# Patient Record
Sex: MALE | Race: WHITE | NOT HISPANIC OR LATINO | URBAN - METROPOLITAN AREA
[De-identification: names, ages, dates, MRNs, and addresses within clinical notes are randomized per-mention and may not be internally consistent; named-entity substitution may affect disease eponyms.]

---

## 2023-08-08 RX ORDER — ATORVASTATIN CALCIUM 80 MG/1
80 TABLET, FILM COATED ORAL AT BEDTIME
Refills: 0 | Status: DISCONTINUED | OUTPATIENT
Start: 2023-08-09 | End: 2023-08-10

## 2023-08-08 RX ORDER — PANTOPRAZOLE SODIUM 20 MG/1
40 TABLET, DELAYED RELEASE ORAL
Refills: 0 | Status: DISCONTINUED | OUTPATIENT
Start: 2023-08-09 | End: 2023-08-10

## 2023-08-08 RX ORDER — TAMSULOSIN HYDROCHLORIDE 0.4 MG/1
1 CAPSULE ORAL
Refills: 0 | DISCHARGE

## 2023-08-08 RX ORDER — SODIUM CHLORIDE 9 MG/ML
3 INJECTION INTRAMUSCULAR; INTRAVENOUS; SUBCUTANEOUS EVERY 8 HOURS
Refills: 0 | Status: DISCONTINUED | OUTPATIENT
Start: 2023-08-09 | End: 2023-08-10

## 2023-08-08 NOTE — H&P ADULT - NSICDXPASTMEDICALHX_GEN_ALL_CORE_FT
PAST MEDICAL HISTORY:  Atrial fibrillation     CAD (coronary artery disease)     HTN (hypertension)

## 2023-08-08 NOTE — H&P ADULT - HISTORY OF PRESENT ILLNESS
62yo M with past medical history significant for HTN, HLD, new onset AF (on Eliquis).. Initially send to ED by primary doctor for new 's noted during office visit. In ED rate control achieved w/ cardizem and pt was admitted for ischemic w/up. NSTabnormal and follow up cardiac cath w/ evidence of severe triple vessel CAD, hyper-dominant RCA, occluded LAB and occluded OM1. Given study results patient transferred to our facility under the care of Dr. Quintero for CABG w/up.     8/8 echo with LVEF 45-50%, no significant VHD.  62yo M with past medical history significant for HTN, HLD and new onset AF (on Eliquis). Initially sent to ED by primary provider for new 's noted during office visit. In ED rate control achieved w/ IV cardizem bolus followed by PO regimen. Admitted for ischemic w/up. NST abnormal. 8/8 echo with LVEF 45-50%, no significant VHD. Cath w/ evidence of severe triple vessel CAD (hyper-dominant RCA, occluded LAD and occluded OM1). Given study results decision made to transfer patient to our facility under the care of Dr. Quintero for CABG w/up.       ? of ring worm from tx sign out.  64yo M with past medical history significant for HTN, HLD and new onset AF (on Eliquis). Initially sent to ED by primary provider for new 's noted during office visit. In ED rate control achieved w/ IV cardizem bolus followed by PO regimen. Admitted for ischemic w/up. NST abnormal. 8/8 echo with LVEF 45-50%, no significant VHD. Cath w/ evidence of severe triple vessel CAD (hyper-dominant RCA, occluded LAD and occluded OM1). Given study results decision made to transfer patient to our facility under the care of Dr. Quintero for CABG w/up. Of note, patient questionable for ring worm infection from tx hospital sign out.  62yo M with past medical history significant for HTN, HLD, new onset AF (on Eliquis) and past spinal surgery for L4-L5 herniation, spinal stenosis. Initially sent to ED by primary provider for new 's noted during office visit. In ED rate control achieved w/ IV cardizem bolus followed by PO regimen. Admitted for ischemic w/up. NST abnormal. 8/8 echo with LVEF 45-50%, no significant VHD. Cath by Dr. Quevedo w/ evidence of severe triple vessel CAD (hyper-dominant RCA, occluded LAD and occluded OM1). Given study results decision made to transfer patient to our facility under the care of Dr. Quintero for CABG w/up. Of note, patient with rash extending on left inner thigh to groin and again on upper right extremity. At this time he denies chest pain, sob, nazario, reduced ET, fatigue, dizziness, syncope, fever.

## 2023-08-08 NOTE — H&P ADULT - ASSESSMENT
Assessment:     64yo M with past medical history significant for HTN, HLD and new onset AF (on Eliquis). Initially sent to ED by primary provider for new 's noted during office visit. In ED rate control achieved w/ IV cardizem bolus followed by PO regimen. Admitted for ischemic w/up. NST abnormal. 8/8 echo with LVEF 45-50%, no significant VHD. Cath w/ evidence of severe triple vessel CAD (hyper-dominant RCA, occluded LAD and occluded OM1). Given study results decision made to transfer patient to our facility under the care of Dr. Quintero for CABG w/up.     ? of ring worm from tx sign out.     Plan:    Neurovascular:   -Pain well controlled with current regimen.   - PRN's:    Cardiovascular:   MvCAD   - cath today w/ severe triple vessel CAD, hyper-dominant RCA, occluded LAD and occluded OM1  - tx to our facility for CABG evaluation; preoperative w/up underway (labs, CXR, EKG, PFT's, carotids, UA ordered; had TTE today at [facility])  - continue *****  HTN   - normotensive on ***  HLD   - continue statin therapy     AF, new onset   -Eliquis on hold (last dose 8/7)   -Hemodynamically stable.   -Monitor: BP, HR, tele    Respiratory:   -Oxygenating well on room air  -Encourage continued use of IS 10x/hr and frequent ambulation  -CXR: pending     GI:  -GI PPX:   -PO Diet  -Bowel Regimen:     Renal / :  -Continue to monitor renal function: BUN/Cr: pending   -Monitor I/O's daily     Endocrine:    -No hx of DM or thyroid dx  -A1c: pending   -TSH: pending     Hematologic:  -no overt s/s of active bleeding   -CBC: H/H-pending   -Coagulation Panel.    ID:  -Temperature: afebrile   -CBC: WBC-no leukocytosis   -Continue to observe for SIRS/Sepsis Syndrome.    Prophylaxis:  -DVT prophylaxis with 5000 SubQ Heparin q8h.  -Continue with SCD's b/l while patient is at rest     Disposition:  Tx from [facility] for CABG eval in the setting of MvCAD   Preoperative labs/imaging ordered (had TTE today)      Assessment:   64yo M with past medical history significant for HTN, HLD, new onset AF (on Eliquis) and past spinal surgery for L4-L5 herniation, spinal stenosis (mild deficit on lower left side "ankle weakness"). Initially sent to ED by primary provider for new 's noted during office visit. In ED rate control achieved w/ IV cardizem bolus followed by PO regimen. Admitted for ischemic w/up. NST abnormal. 8/8 echo with LVEF 45-50%, no significant VHD. Cath by Dr. Quevedo w/ evidence of severe triple vessel CAD (hyper-dominant RCA, occluded LAD and occluded OM1). Given study results decision made to transfer patient to our facility under the care of Dr. Quintero for CABG w/up.     Plan:    Neurovascular:   Hx of spinal surgery  - Left ankle weakness at baseline   - Pain well controlled with current regimen.   - PRN's: Tylenol   -Consider gabapentin. patient has taken in the past     Cardiovascular:   MvCAD   - cath today w/ severe triple vessel CAD, hyper-dominant RCA, occluded LAD and occluded OM1  - transfer to [facility] for CABG evaluation; preoperative w/up underway (labs, CXR, EKG, PFT's, carotids, UA ordered; had TTE today at [facility])  HTN   - normotensive on amlodipine 10  HLD   - continue statin therapy   AF, new onset   -Eliquis on hold (last dose 8/7)   - started on heparin gtt   -Hemodynamically stable.   -Monitor: BP, HR, tele    Respiratory:   -Oxygenating well on room air  -Encourage continued use of IS 10x/hr and frequent ambulation  -CXR: pending     GI:  -GI PPX: protonix   -PO Diet    Renal / :  -Continue to monitor renal function: BUN/Cr: pending   -Monitor I/O's daily     Endocrine:    -No hx of DM or thyroid dx  -A1c: pending   -TSH: pending     Hematologic:  -no overt s/s of active bleeding   -CBC: H/H-pending   -Coagulation Panel.    ID:  -Temperature: afebrile   -CBC: WBC-no leukocytosis   -Continue to observe for SIRS/Sepsis Syndrome.    Prophylaxis:  -DVT prophylaxis with 5000 SubQ Heparin q8h.  -Continue with SCD's b/l while patient is at rest     Disposition:  Tx from [facility] for CABG eval in the setting of MvCAD   Preoperative labs/imaging ordered (had TTE today)      Assessment:   64yo M with past medical history significant for HTN, HLD, new onset AF (on Eliquis) and past spinal surgery for L4-L5 herniation, spinal stenosis (mild deficit on lower left side "ankle weakness"). Initially sent to ED by primary provider for new 's noted during office visit. In ED rate control achieved w/ IV cardizem bolus followed by PO regimen. Admitted for ischemic w/up. NST abnormal. 8/8 echo with LVEF 45-50%, no significant VHD. Cath by Dr. Quevedo w/ evidence of severe triple vessel CAD (hyper-dominant RCA, occluded LAD and occluded OM1). Given study results decision made to transfer patient to our facility under the care of Dr. Quintero for CABG w/up.     Plan:    Neurovascular:   Hx of spinal surgery  - Left ankle weakness at baseline   - Pain well controlled with current regimen.   - PRN's: Tylenol   -Consider gabapentin. patient has taken in the past     Cardiovascular:   MvCAD   - cath today w/ severe triple vessel CAD, hyper-dominant RCA, occluded LAD and occluded OM1  - transfer to [facility] for CABG evaluation; preoperative w/up underway (labs, CXR, EKG, PFT's, carotids, UA ordered; had TTE today at [facility])  HTN   - normotensive  - starting lopressor 12.5 BID   HLD   - continue statin therapy   AF, new onset   -Eliquis on hold (last dose 8/7)   - cont heparin gtt; ptt subtherapeutic on arrival   -Hemodynamically stable.   -Monitor: BP, HR, tele    Respiratory:   -Oxygenating well on room air  -Encourage continued use of IS 10x/hr and frequent ambulation  -CXR: pending     GI:  -GI PPX: protonix   -PO Diet    Renal / :  -Continue to monitor renal function: BUN/Cr: pending   -Monitor I/O's daily     Endocrine:    -No hx of DM or thyroid dx  -A1c: pending   -TSH: pending     Hematologic:  -no overt s/s of active bleeding   -CBC: H/H-pending     ID:  -Temperature: afebrile   -CBC: WBC-no leukocytosis   -Continue to observe for SIRS/Sepsis Syndrome.    Prophylaxis:  -DVT prophylaxis with heparin gtt  -Continue with SCD's b/l while patient is at rest     Disposition:  Tx from [facility] for CABG eval in the setting of MvCAD   Preoperative labs/imaging ordered

## 2023-08-08 NOTE — H&P ADULT - NSHPREVIEWOFSYSTEMS_GEN_ALL_CORE
Review of Systems  CONSTITUTIONAL:  Denies Fevers / chills, sweats, fatigue, weight loss, weight gain                                      NEURO:  Denies changes in sensation, seizures, syncope, confusion                                                                            EYES:  Denies Blurry vision, discharge, pain, loss of vision                                                                                    ENT:  Denies Difficulty hearing, vertigo, dysphagia, epistaxis, recent dental work                                       CV:  Denies Chest pain, palpitations, HURTADO, orthopnea                                                                                          RESPIRATORY:  Denies Wheezing, SOB, cough / sputum, hemoptysis                                                                GI:  Denies Nausea, vomiting, diarrhea, constipation, melena, difficulty swallowing                                               : Denies Hematuria, dysuria, urgency, incontinence                                                                                         MUSCULOSKELETAL:  Denies arthritis, joint swelling, muscle weakness                                                             SKIN/BREAST:  Denies rash, itching, hair loss, masses                                                                                            PSYCH:  Denies depression, anxiety, suicidal ideation                                                                                               HEME/LYMPH:  Denies bruises easily, enlarged lymph nodes, tender lymph nodes                                        ENDOCRINE:  Denies cold intolerance, heat intolerance, polydipsia Review of Systems  CONSTITUTIONAL:  Denies Fevers / chills, sweats, fatigue, weight loss, weight gain                                      NEURO:  Denies changes in sensation, seizures, syncope, confusion                                                                            EYES:  Denies Blurry vision, discharge, pain, loss of vision                                                                                    ENT:  Denies Difficulty hearing, vertigo, dysphagia, epistaxis, recent dental work                                       CV:  Denies Chest pain, palpitations, HURTADO, orthopnea                                                                                          RESPIRATORY:  Denies Wheezing, SOB, cough / sputum, hemoptysis                                                                GI:  Denies Nausea, vomiting, diarrhea, constipation, melena, difficulty swallowing                                               : Denies Hematuria, dysuria, urgency, incontinence                                                                                         MUSCULOSKELETAL:  Denies arthritis, joint swelling, muscle weakness                                                             SKIN/BREAST:  + Itchy rash on left thigh, groin and right upper arm. Denies hair loss, masses                                                                                            PSYCH:  Denies depression, anxiety, suicidal ideation                                                                                               HEME/LYMPH:  Denies bruises easily, enlarged lymph nodes, tender lymph nodes                                        ENDOCRINE:  Denies cold intolerance, heat intolerance, polydipsia

## 2023-08-08 NOTE — H&P ADULT - NSHPPHYSICALEXAM_GEN_ALL_CORE
General: NAD, sitting comfortably in chair, conversing appropriately  Neurological: alert and oriented, UE and LE strength equal b/l, facial symmetry present  Cardiovascular: RRR, Clear S1 and S2, no murmurs appreciated  Respiratory: chest expansion symmetrical, CTA b/l, no wheezing noted  Gastrointestinal: +BS, soft, NT, ND  Extremities: moving spontaneously, no calf tenderness or edema.  Vascular: warm, well perfused. DP/PT pulses palpable b/l.  Incisions: General: NAD, sitting comfortably in chair, conversing appropriately  Neurological: alert and oriented, UE and LE strength equal b/l, facial symmetry present  Cardiovascular: irregularly irregular, Clear S1 and S2, no murmurs appreciated  Respiratory: chest expansion symmetrical, CTA b/l, no wheezing noted  Gastrointestinal: +BS, soft, NT, ND  Extremities: moving spontaneously, no calf tenderness or edema.  Vascular: warm, well perfused. DP/PT pulses palpable b/l.  Incisions: right radial site C/D/I General:  male in NAD, sitting comfortably in bed, conversing appropriately  Neurological: + Left leg with ankle weakness 2/2 spinal surgery. Alert and oriented, facial symmetry present  Cardiovascular: + Irregularly irregular, Clear S1 and S2, no murmurs appreciated  Respiratory: chest expansion symmetrical, CTA b/l, no wheezing noted  Gastrointestinal: +BS, soft, NT, ND  Extremities: moving spontaneously, no calf tenderness or edema. erythematous, scaly rash on upper left thigh, inner groins, right arm   Vascular: warm, well perfused. DP/PT pulses palpable b/l.  Incisions: right radial cath site C/D/I General:  male in NAD, sitting comfortably in bed, conversing appropriately  Neurological: + Right ankle weakness 2/2 spinal surgery. Alert and oriented, facial symmetry present  Cardiovascular: + Irregularly irregular, Clear S1 and S2, no murmurs appreciated  Respiratory: chest expansion symmetrical, CTA b/l, no wheezing noted  Gastrointestinal: +BS, soft, NT, ND  Extremities: moving spontaneously, no calf tenderness or edema. erythematous, scaly rash on upper left thigh, inner groins, right arm   Vascular: warm, well perfused. DP/PT pulses palpable b/l.  Incisions: right radial cath site C/D/I

## 2023-08-09 ENCOUNTER — INPATIENT (INPATIENT)
Facility: HOSPITAL | Age: 63
LOS: 5 days | Discharge: HOME HEALTH | DRG: 234 | End: 2023-08-15
Attending: THORACIC SURGERY (CARDIOTHORACIC VASCULAR SURGERY) | Admitting: THORACIC SURGERY (CARDIOTHORACIC VASCULAR SURGERY)
Payer: COMMERCIAL

## 2023-08-09 ENCOUNTER — TRANSCRIPTION ENCOUNTER (OUTPATIENT)
Age: 63
End: 2023-08-09

## 2023-08-09 VITALS
OXYGEN SATURATION: 97 % | HEIGHT: 71 IN | RESPIRATION RATE: 18 BRPM | WEIGHT: 232.81 LBS | DIASTOLIC BLOOD PRESSURE: 82 MMHG | HEART RATE: 92 BPM | SYSTOLIC BLOOD PRESSURE: 130 MMHG

## 2023-08-09 LAB
A1C WITH ESTIMATED AVERAGE GLUCOSE RESULT: 5.9 % — HIGH (ref 4–5.6)
ALBUMIN SERPL ELPH-MCNC: 4.5 G/DL — SIGNIFICANT CHANGE UP (ref 3.3–5)
ALP SERPL-CCNC: 58 U/L — SIGNIFICANT CHANGE UP (ref 40–120)
ALT FLD-CCNC: 27 U/L — SIGNIFICANT CHANGE UP (ref 10–45)
ANION GAP SERPL CALC-SCNC: 13 MMOL/L — SIGNIFICANT CHANGE UP (ref 5–17)
APPEARANCE UR: CLEAR — SIGNIFICANT CHANGE UP
APTT BLD: 47.9 SEC — HIGH (ref 24.5–35.6)
APTT BLD: 74.5 SEC — HIGH (ref 24.5–35.6)
APTT BLD: 84.5 SEC — HIGH (ref 24.5–35.6)
APTT BLD: 90.2 SEC — HIGH (ref 24.5–35.6)
AST SERPL-CCNC: 21 U/L — SIGNIFICANT CHANGE UP (ref 10–40)
BACTERIA # UR AUTO: PRESENT /HPF
BASOPHILS # BLD AUTO: 0.02 K/UL — SIGNIFICANT CHANGE UP (ref 0–0.2)
BASOPHILS NFR BLD AUTO: 0.3 % — SIGNIFICANT CHANGE UP (ref 0–2)
BILIRUB SERPL-MCNC: 1.1 MG/DL — SIGNIFICANT CHANGE UP (ref 0.2–1.2)
BILIRUB UR-MCNC: NEGATIVE — SIGNIFICANT CHANGE UP
BLD GP AB SCN SERPL QL: NEGATIVE — SIGNIFICANT CHANGE UP
BLD GP AB SCN SERPL QL: NEGATIVE — SIGNIFICANT CHANGE UP
BUN SERPL-MCNC: 13 MG/DL — SIGNIFICANT CHANGE UP (ref 7–23)
CALCIUM SERPL-MCNC: 10 MG/DL — SIGNIFICANT CHANGE UP (ref 8.4–10.5)
CHLORIDE SERPL-SCNC: 102 MMOL/L — SIGNIFICANT CHANGE UP (ref 96–108)
CHOLEST SERPL-MCNC: 132 MG/DL — SIGNIFICANT CHANGE UP
CK MB CFR SERPL CALC: 3.9 NG/ML — SIGNIFICANT CHANGE UP (ref 0–6.7)
CK SERPL-CCNC: 168 U/L — SIGNIFICANT CHANGE UP (ref 30–200)
CO2 SERPL-SCNC: 23 MMOL/L — SIGNIFICANT CHANGE UP (ref 22–31)
COLOR SPEC: YELLOW — SIGNIFICANT CHANGE UP
CREAT SERPL-MCNC: 0.79 MG/DL — SIGNIFICANT CHANGE UP (ref 0.5–1.3)
DIFF PNL FLD: NEGATIVE — SIGNIFICANT CHANGE UP
EGFR: 100 ML/MIN/1.73M2 — SIGNIFICANT CHANGE UP
EOSINOPHIL # BLD AUTO: 0.04 K/UL — SIGNIFICANT CHANGE UP (ref 0–0.5)
EOSINOPHIL NFR BLD AUTO: 0.5 % — SIGNIFICANT CHANGE UP (ref 0–6)
EPI CELLS # UR: SIGNIFICANT CHANGE UP /HPF (ref 0–5)
ESTIMATED AVERAGE GLUCOSE: 123 MG/DL — HIGH (ref 68–114)
GLUCOSE SERPL-MCNC: 152 MG/DL — HIGH (ref 70–99)
GLUCOSE UR QL: NEGATIVE — SIGNIFICANT CHANGE UP
HCT VFR BLD CALC: 41.2 % — SIGNIFICANT CHANGE UP (ref 39–50)
HCV AB S/CO SERPL IA: 0.05 S/CO — SIGNIFICANT CHANGE UP
HCV AB SERPL-IMP: SIGNIFICANT CHANGE UP
HDLC SERPL-MCNC: 36 MG/DL — LOW
HGB BLD-MCNC: 14.2 G/DL — SIGNIFICANT CHANGE UP (ref 13–17)
IMM GRANULOCYTES NFR BLD AUTO: 0.4 % — SIGNIFICANT CHANGE UP (ref 0–0.9)
INR BLD: 1.09 — SIGNIFICANT CHANGE UP (ref 0.85–1.18)
KETONES UR-MCNC: NEGATIVE — SIGNIFICANT CHANGE UP
LEUKOCYTE ESTERASE UR-ACNC: NEGATIVE — SIGNIFICANT CHANGE UP
LIPID PNL WITH DIRECT LDL SERPL: 58 MG/DL — SIGNIFICANT CHANGE UP
LYMPHOCYTES # BLD AUTO: 1.08 K/UL — SIGNIFICANT CHANGE UP (ref 1–3.3)
LYMPHOCYTES # BLD AUTO: 14.6 % — SIGNIFICANT CHANGE UP (ref 13–44)
MAGNESIUM SERPL-MCNC: 2.2 MG/DL — SIGNIFICANT CHANGE UP (ref 1.6–2.6)
MCHC RBC-ENTMCNC: 31 PG — SIGNIFICANT CHANGE UP (ref 27–34)
MCHC RBC-ENTMCNC: 34.5 GM/DL — SIGNIFICANT CHANGE UP (ref 32–36)
MCV RBC AUTO: 90 FL — SIGNIFICANT CHANGE UP (ref 80–100)
MONOCYTES # BLD AUTO: 0.47 K/UL — SIGNIFICANT CHANGE UP (ref 0–0.9)
MONOCYTES NFR BLD AUTO: 6.3 % — SIGNIFICANT CHANGE UP (ref 2–14)
NEUTROPHILS # BLD AUTO: 5.78 K/UL — SIGNIFICANT CHANGE UP (ref 1.8–7.4)
NEUTROPHILS NFR BLD AUTO: 77.9 % — HIGH (ref 43–77)
NITRITE UR-MCNC: NEGATIVE — SIGNIFICANT CHANGE UP
NON HDL CHOLESTEROL: 96 MG/DL — SIGNIFICANT CHANGE UP
NRBC # BLD: 0 /100 WBCS — SIGNIFICANT CHANGE UP (ref 0–0)
NT-PROBNP SERPL-SCNC: 1149 PG/ML — HIGH (ref 0–300)
PA ADP PRP-ACNC: 261 PRU — SIGNIFICANT CHANGE UP (ref 194–418)
PH UR: 6.5 — SIGNIFICANT CHANGE UP (ref 5–8)
PHOSPHATE SERPL-MCNC: 3.7 MG/DL — SIGNIFICANT CHANGE UP (ref 2.5–4.5)
PLATELET # BLD AUTO: 193 K/UL — SIGNIFICANT CHANGE UP (ref 150–400)
POTASSIUM SERPL-MCNC: 3.6 MMOL/L — SIGNIFICANT CHANGE UP (ref 3.5–5.3)
POTASSIUM SERPL-SCNC: 3.6 MMOL/L — SIGNIFICANT CHANGE UP (ref 3.5–5.3)
PROT SERPL-MCNC: 7 G/DL — SIGNIFICANT CHANGE UP (ref 6–8.3)
PROT UR-MCNC: ABNORMAL MG/DL
PROTHROM AB SERPL-ACNC: 12.4 SEC — SIGNIFICANT CHANGE UP (ref 9.5–13)
RBC # BLD: 4.58 M/UL — SIGNIFICANT CHANGE UP (ref 4.2–5.8)
RBC # FLD: 12.7 % — SIGNIFICANT CHANGE UP (ref 10.3–14.5)
RBC CASTS # UR COMP ASSIST: < 5 /HPF — SIGNIFICANT CHANGE UP
RH IG SCN BLD-IMP: POSITIVE — SIGNIFICANT CHANGE UP
RH IG SCN BLD-IMP: POSITIVE — SIGNIFICANT CHANGE UP
SODIUM SERPL-SCNC: 138 MMOL/L — SIGNIFICANT CHANGE UP (ref 135–145)
SP GR SPEC: 1.02 — SIGNIFICANT CHANGE UP (ref 1–1.03)
TRIGL SERPL-MCNC: 189 MG/DL — HIGH
TROPONIN T, HIGH SENSITIVITY RESULT: 23 NG/L — SIGNIFICANT CHANGE UP (ref 0–51)
TSH SERPL-MCNC: 1.05 UIU/ML — SIGNIFICANT CHANGE UP (ref 0.27–4.2)
UROBILINOGEN FLD QL: 1 E.U./DL — SIGNIFICANT CHANGE UP
WBC # BLD: 7.42 K/UL — SIGNIFICANT CHANGE UP (ref 3.8–10.5)
WBC # FLD AUTO: 7.42 K/UL — SIGNIFICANT CHANGE UP (ref 3.8–10.5)
WBC UR QL: < 5 /HPF — SIGNIFICANT CHANGE UP

## 2023-08-09 PROCEDURE — 94010 BREATHING CAPACITY TEST: CPT | Mod: 26

## 2023-08-09 PROCEDURE — 71045 X-RAY EXAM CHEST 1 VIEW: CPT | Mod: 26

## 2023-08-09 PROCEDURE — 93306 TTE W/DOPPLER COMPLETE: CPT | Mod: 26

## 2023-08-09 PROCEDURE — 93880 EXTRACRANIAL BILAT STUDY: CPT | Mod: 26

## 2023-08-09 RX ORDER — CLOTRIMAZOLE AND BETAMETHASONE DIPROPIONATE 10; .5 MG/G; MG/G
1 CREAM TOPICAL
Refills: 0 | Status: DISCONTINUED | OUTPATIENT
Start: 2023-08-09 | End: 2023-08-10

## 2023-08-09 RX ORDER — CHLORHEXIDINE GLUCONATE 213 G/1000ML
1 SOLUTION TOPICAL ONCE
Refills: 0 | Status: COMPLETED | OUTPATIENT
Start: 2023-08-10 | End: 2023-08-10

## 2023-08-09 RX ORDER — POTASSIUM CHLORIDE 20 MEQ
40 PACKET (EA) ORAL ONCE
Refills: 0 | Status: COMPLETED | OUTPATIENT
Start: 2023-08-09 | End: 2023-08-09

## 2023-08-09 RX ORDER — HEPARIN SODIUM 5000 [USP'U]/ML
12 INJECTION INTRAVENOUS; SUBCUTANEOUS
Qty: 25000 | Refills: 0 | Status: DISCONTINUED | OUTPATIENT
Start: 2023-08-09 | End: 2023-08-09

## 2023-08-09 RX ORDER — CHLORHEXIDINE GLUCONATE 213 G/1000ML
1 SOLUTION TOPICAL ONCE
Refills: 0 | Status: COMPLETED | OUTPATIENT
Start: 2023-08-09 | End: 2023-08-09

## 2023-08-09 RX ORDER — METOPROLOL TARTRATE 50 MG
12.5 TABLET ORAL EVERY 12 HOURS
Refills: 0 | Status: DISCONTINUED | OUTPATIENT
Start: 2023-08-09 | End: 2023-08-10

## 2023-08-09 RX ORDER — MUPIROCIN 20 MG/G
1 OINTMENT TOPICAL
Refills: 0 | Status: DISCONTINUED | OUTPATIENT
Start: 2023-08-09 | End: 2023-08-10

## 2023-08-09 RX ORDER — AMLODIPINE BESYLATE 2.5 MG/1
10 TABLET ORAL DAILY
Refills: 0 | Status: DISCONTINUED | OUTPATIENT
Start: 2023-08-09 | End: 2023-08-09

## 2023-08-09 RX ORDER — DIAZEPAM 5 MG
1 TABLET ORAL
Refills: 0 | DISCHARGE

## 2023-08-09 RX ORDER — HEPARIN SODIUM 5000 [USP'U]/ML
1700 INJECTION INTRAVENOUS; SUBCUTANEOUS
Qty: 25000 | Refills: 0 | Status: DISCONTINUED | OUTPATIENT
Start: 2023-08-09 | End: 2023-08-10

## 2023-08-09 RX ORDER — METOPROLOL TARTRATE 50 MG
12.5 TABLET ORAL DAILY
Refills: 0 | Status: DISCONTINUED | OUTPATIENT
Start: 2023-08-09 | End: 2023-08-09

## 2023-08-09 RX ORDER — OXYCODONE HYDROCHLORIDE 5 MG/1
1 TABLET ORAL
Refills: 0 | DISCHARGE

## 2023-08-09 RX ORDER — CHLORHEXIDINE GLUCONATE 213 G/1000ML
15 SOLUTION TOPICAL ONCE
Refills: 0 | Status: COMPLETED | OUTPATIENT
Start: 2023-08-10 | End: 2023-08-10

## 2023-08-09 RX ORDER — TAMSULOSIN HYDROCHLORIDE 0.4 MG/1
1 CAPSULE ORAL
Refills: 0 | DISCHARGE

## 2023-08-09 RX ORDER — RAMIPRIL 5 MG
1 CAPSULE ORAL
Refills: 0 | DISCHARGE

## 2023-08-09 RX ORDER — ACETAMINOPHEN 500 MG
650 TABLET ORAL EVERY 6 HOURS
Refills: 0 | Status: DISCONTINUED | OUTPATIENT
Start: 2023-08-09 | End: 2023-08-10

## 2023-08-09 RX ORDER — ASCORBIC ACID 60 MG
2000 TABLET,CHEWABLE ORAL ONCE
Refills: 0 | Status: COMPLETED | OUTPATIENT
Start: 2023-08-09 | End: 2023-08-09

## 2023-08-09 RX ORDER — ACETAMINOPHEN 500 MG
1000 TABLET ORAL ONCE
Refills: 0 | Status: COMPLETED | OUTPATIENT
Start: 2023-08-09 | End: 2023-08-10

## 2023-08-09 RX ADMIN — Medication 40 MILLIEQUIVALENT(S): at 05:00

## 2023-08-09 RX ADMIN — CHLORHEXIDINE GLUCONATE 1 APPLICATION(S): 213 SOLUTION TOPICAL at 21:49

## 2023-08-09 RX ADMIN — ATORVASTATIN CALCIUM 80 MILLIGRAM(S): 80 TABLET, FILM COATED ORAL at 21:49

## 2023-08-09 RX ADMIN — SODIUM CHLORIDE 3 MILLILITER(S): 9 INJECTION INTRAMUSCULAR; INTRAVENOUS; SUBCUTANEOUS at 21:04

## 2023-08-09 RX ADMIN — SODIUM CHLORIDE 3 MILLILITER(S): 9 INJECTION INTRAMUSCULAR; INTRAVENOUS; SUBCUTANEOUS at 05:19

## 2023-08-09 RX ADMIN — Medication 12.5 MILLIGRAM(S): at 05:01

## 2023-08-09 RX ADMIN — CHLORHEXIDINE GLUCONATE 1 APPLICATION(S): 213 SOLUTION TOPICAL at 20:45

## 2023-08-09 RX ADMIN — CLOTRIMAZOLE AND BETAMETHASONE DIPROPIONATE 1 APPLICATION(S): 10; .5 CREAM TOPICAL at 18:19

## 2023-08-09 RX ADMIN — SODIUM CHLORIDE 3 MILLILITER(S): 9 INJECTION INTRAMUSCULAR; INTRAVENOUS; SUBCUTANEOUS at 14:55

## 2023-08-09 RX ADMIN — MUPIROCIN 1 APPLICATION(S): 20 OINTMENT TOPICAL at 18:19

## 2023-08-09 RX ADMIN — HEPARIN SODIUM 17 UNIT(S)/HR: 5000 INJECTION INTRAVENOUS; SUBCUTANEOUS at 04:14

## 2023-08-09 RX ADMIN — Medication 2000 MILLIGRAM(S): at 21:49

## 2023-08-09 RX ADMIN — PANTOPRAZOLE SODIUM 40 MILLIGRAM(S): 20 TABLET, DELAYED RELEASE ORAL at 05:01

## 2023-08-09 RX ADMIN — Medication 12.5 MILLIGRAM(S): at 18:19

## 2023-08-09 NOTE — PATIENT PROFILE ADULT - FALL HARM RISK - HARM RISK INTERVENTIONS

## 2023-08-09 NOTE — PROGRESS NOTE ADULT - SUBJECTIVE AND OBJECTIVE BOX
Planned Date of Surgery: 8/10                                                                                                           Surgeon/Attending MD: Ingrid    Procedure: CABG    Subjective: Resting comfortably in bed, NAD, feeling well at this time    HPI:  63y Male    PAST MEDICAL & SURGICAL HISTORY:  CAD (coronary artery disease)      HTN (hypertension)      Atrial fibrillation          Allergy Status Unknown      Vitals:  T(C): 37.4 (08-09-23 @ 13:35), Max: 37.4 (08-09-23 @ 13:35)  HR: 93 (08-09-23 @ 11:00) (79 - 93)  BP: 142/79 (08-09-23 @ 11:00) (130/82 - 146/78)  RR: 18 (08-09-23 @ 11:00) (18 - 18)  SpO2: 99% (08-09-23 @ 11:00) (97% - 99%)    Physical Exam    General:   Neurological: AOx3. Motor skills grossly intact  Skin: well demarkated rash on bilateral groin with flaky plaque overlying. Rash extends to medial knee on L side.   Cardiovascular: Normal S1/S2. Regular rate/rhythm. No murmurs  Respiratory: Lungs CTA bilaterally. No wheezing or rales  Gastrointestinal: +BS in all 4 quadrants. Non-distended. Soft. Non-tender  Extremities: Strength 5/5 b/l upper/lower extremities. Sensation grossly intact upper/lower extremities. No edema. No calf tenderness.  Vascular: Radial 2+bilaterally, DP 2+ b/l  Incision Sites: NA      MEDICATIONS  (STANDING):  acetaminophen     Tablet .. 1000 milliGRAM(s) Oral once  ascorbic acid 2000 milliGRAM(s) Oral once  atorvastatin 80 milliGRAM(s) Oral at bedtime  chlorhexidine 4% Liquid 1 Application(s) Topical once  chlorhexidine 4% Liquid 1 Application(s) Topical once  clotrimazole/betamethasone Cream 1 Application(s) Topical two times a day  heparin  Infusion 1700 Unit(s)/Hr (16.5 mL/Hr) IV Continuous <Continuous>  metoprolol tartrate 12.5 milliGRAM(s) Oral every 12 hours  mupirocin 2% Ointment 1 Application(s) Both Nostrils two times a day  pantoprazole    Tablet 40 milliGRAM(s) Oral before breakfast  sodium chloride 0.9% lock flush 3 milliLiter(s) IV Push every 8 hours    MEDICATIONS  (PRN):  acetaminophen     Tablet .. 650 milliGRAM(s) Oral every 6 hours PRN Mild Pain (1 - 3)      On Beta Blocker? YES   Labs:                        14.2   7.42  )-----------( 193      ( 09 Aug 2023 02:57 )             41.2     08-09    138  |  102  |  13  ----------------------------<  152<H>  3.6   |  23  |  0.79    Ca    10.0      09 Aug 2023 02:57  Phos  3.7     08-09  Mg     2.2     08-09    TPro  7.0  /  Alb  4.5  /  TBili  1.1  /  DBili  x   /  AST  21  /  ALT  27  /  AlkPhos  58  08-09    PT/INR - ( 09 Aug 2023 02:57 )   PT: 12.4 sec;   INR: 1.09          PTT - ( 09 Aug 2023 11:56 )  PTT:90.2 sec  Urinalysis Basic - ( 09 Aug 2023 02:57 )    Color: x / Appearance: x / SG: x / pH: x  Gluc: 152 mg/dL / Ketone: x  / Bili: x / Urobili: x   Blood: x / Protein: x / Nitrite: x   Leuk Esterase: x / RBC: x / WBC x   Sq Epi: x / Non Sq Epi: x / Bacteria: x      ABO Interpretation: A (08-09-23 @ 03:13)      CARDIAC MARKERS ( 09 Aug 2023 02:57 )  x     / x     / 168 U/L / x     / 3.9 ng/mL        Preoperative Checklist: (x = completed, n/a = not applicable, p = pending)  [ x] ECHO  [ x] CXR  [ x] Carotid Duplex  [ na CT imaging  [ x] PFTs  [ na UA  [ x] Baseline Labs (CMP, CBC w/ diff, PTT, PT/INR)  [ x] A1c  [ x] TSH  [ x] Lipid panel  [ x] Cardiac enzymes  [ x] Pro BNP  [ x] EKG   [ x] T&S 1  [ x] T&S 2 (within 72 hours)  [ na COVID PCR (within 72 hours)  [ na} ro air ABG  [ x] P2Y12  [ na bHCG  [ x] Consents  [ x] Pre-op Orders Placed  [x ] Blood Products Ordered  [ x] NPO at midnight      Abnormal/Noteworthy Preoperative Testing Results:     < from: Echocardiogram w/ Bubble and Doppler (08.09.23 @ 08:35) >  CONCLUSIONS:     1. Borderline normal left ventricular systolic function. Ejection   fraction of 50-55%.   2. Abnormal septal motion seen due to abnormal conduction. The other   walls exhibit normal wall motion.   3. Normal right ventricular size and systolic function.   4. Moderately dilated left atrium.   5. Color flow Doppler reveals evidence of a patent foramen ovale.   6. Injection of agitated saline via a peripheral vein reveals no   evidence of a right-to-left shunt.   7. Aortic sclerosis without significant stenosis.   8. No pericardial effusion.   9. No prior echo is available for comparison.    < end of copied text >

## 2023-08-09 NOTE — PROGRESS NOTE ADULT - ASSESSMENT
64yo M with past medical history significant for HTN, HLD, new onset AF (on Eliquis) and past spinal surgery for L4-L5 herniation, spinal stenosis (mild deficit on lower left side "ankle weakness"). Initially sent to ED by primary provider for new 's noted during office visit. In ED rate control achieved w/ IV cardizem bolus followed by PO regimen. Admitted for ischemic w/up. NST abnormal. 8/8 echo with LVEF 45-50%, no significant VHD. Cath by Dr. Quevedo w/ evidence of severe triple vessel CAD (hyper-dominant RCA, occluded LAD and occluded OM1). Given study results decision made to transfer patient to our facility under the care of Dr. Quintero for CABG. Plan for pt to go to OR tomorrow for CABG. Spoke with dermatologist regarding rash on legs. Consulted via video chat. Feels that it is fungal in nature. Will be treated with an antifungal cream.     Plan:    Neurovascular:   -Pain well controlled with current regimen. PRN's: tylenol    Cardiovascular:   -Hemodynamically stable.   -Monitor: BP, HR, tele  -CAD    -plan for CABG tomorrow    -c/w heparin drip at 17  -HLD    -c/w lipitor  -HTN    -c/w metoprolol 12.5q12    Respiratory:   -Oxygenating well on room air  -Encourage continued use of IS 10x/hr and frequent ambulation  -CXR: WNL    GI:  -GI PPX: protonix  -PO Diet  -Bowel Regimen: none    Renal / :  -Continue to monitor renal function: BUN/Cr 13/.79  -Monitor I/O's daily     Endocrine:    -No hx of DM or thyroid dx  -A1c: 5.9  -TSH: 1.05    Hematologic:  -CBC: H/H- 14.2/41.2  -Coagulation Panel.    ID:  -Temperature: 37.2  -CBC: WBC- 7.42  -Continue to observe for SIRS/Sepsis Syndrome.    Prophylaxis:  -DVT prophylaxis with heparin drip  -Continue with SCD's b/l while patient is at rest     Disposition:  -OR for CABG tomorrow

## 2023-08-09 NOTE — PATIENT PROFILE ADULT - INTERNATIONAL TRAVEL
· Renal panel same day as fiberoptic bronchoscopy, notify endoscopy  · Metolazone 5 mg only on days with weight greater than 202#   · Continue Daliresp     If you have questions, please do not hesitate to call me. I look forward to following Amy Killian along with you.     Sincerely,        Jessica Gutierres MD    CC providers:    Wade Mart No

## 2023-08-10 ENCOUNTER — TRANSCRIPTION ENCOUNTER (OUTPATIENT)
Age: 63
End: 2023-08-10

## 2023-08-10 ENCOUNTER — APPOINTMENT (OUTPATIENT)
Dept: CARDIOTHORACIC SURGERY | Facility: HOSPITAL | Age: 63
End: 2023-08-10

## 2023-08-10 LAB
ALBUMIN SERPL ELPH-MCNC: 3.5 G/DL — SIGNIFICANT CHANGE UP (ref 3.3–5)
ALBUMIN SERPL ELPH-MCNC: 3.6 G/DL — SIGNIFICANT CHANGE UP (ref 3.3–5)
ALP SERPL-CCNC: 45 U/L — SIGNIFICANT CHANGE UP (ref 40–120)
ALP SERPL-CCNC: 53 U/L — SIGNIFICANT CHANGE UP (ref 40–120)
ALT FLD-CCNC: 25 U/L — SIGNIFICANT CHANGE UP (ref 10–45)
ALT FLD-CCNC: 26 U/L — SIGNIFICANT CHANGE UP (ref 10–45)
ANION GAP SERPL CALC-SCNC: 13 MMOL/L — SIGNIFICANT CHANGE UP (ref 5–17)
ANION GAP SERPL CALC-SCNC: 13 MMOL/L — SIGNIFICANT CHANGE UP (ref 5–17)
ANION GAP SERPL CALC-SCNC: 14 MMOL/L — SIGNIFICANT CHANGE UP (ref 5–17)
APPEARANCE UR: CLEAR — SIGNIFICANT CHANGE UP
APTT BLD: 115.4 SEC — HIGH (ref 24.5–35.6)
APTT BLD: 24.5 SEC — SIGNIFICANT CHANGE UP (ref 24.5–35.6)
APTT BLD: 30.7 SEC — SIGNIFICANT CHANGE UP (ref 24.5–35.6)
APTT BLD: 67.3 SEC — HIGH (ref 24.5–35.6)
AST SERPL-CCNC: 44 U/L — HIGH (ref 10–40)
AST SERPL-CCNC: 50 U/L — HIGH (ref 10–40)
B-OH-BUTYR SERPL-SCNC: 1.6 MMOL/L — HIGH
BACTERIA # UR AUTO: PRESENT /HPF
BASE EXCESS BLDA CALC-SCNC: -0.8 MMOL/L — SIGNIFICANT CHANGE UP (ref -2–3)
BASE EXCESS BLDA CALC-SCNC: -0.8 MMOL/L — SIGNIFICANT CHANGE UP (ref -2–3)
BASE EXCESS BLDA CALC-SCNC: -2.2 MMOL/L — LOW (ref -2–3)
BASE EXCESS BLDA CALC-SCNC: -2.3 MMOL/L — LOW (ref -2–3)
BASE EXCESS BLDA CALC-SCNC: -3.3 MMOL/L — LOW (ref -2–3)
BASE EXCESS BLDA CALC-SCNC: -3.5 MMOL/L — LOW (ref -2–3)
BASE EXCESS BLDA CALC-SCNC: -4.5 MMOL/L — LOW (ref -2–3)
BASE EXCESS BLDA CALC-SCNC: 0.5 MMOL/L — SIGNIFICANT CHANGE UP (ref -2–3)
BASE EXCESS BLDV CALC-SCNC: -0.5 MMOL/L — SIGNIFICANT CHANGE UP (ref -2–3)
BASOPHILS # BLD AUTO: 0.03 K/UL — SIGNIFICANT CHANGE UP (ref 0–0.2)
BASOPHILS # BLD AUTO: 0.04 K/UL — SIGNIFICANT CHANGE UP (ref 0–0.2)
BASOPHILS NFR BLD AUTO: 0.1 % — SIGNIFICANT CHANGE UP (ref 0–2)
BASOPHILS NFR BLD AUTO: 0.2 % — SIGNIFICANT CHANGE UP (ref 0–2)
BILIRUB SERPL-MCNC: 0.9 MG/DL — SIGNIFICANT CHANGE UP (ref 0.2–1.2)
BILIRUB SERPL-MCNC: 1 MG/DL — SIGNIFICANT CHANGE UP (ref 0.2–1.2)
BILIRUB UR-MCNC: NEGATIVE — SIGNIFICANT CHANGE UP
BUN SERPL-MCNC: 12 MG/DL — SIGNIFICANT CHANGE UP (ref 7–23)
BUN SERPL-MCNC: 13 MG/DL — SIGNIFICANT CHANGE UP (ref 7–23)
BUN SERPL-MCNC: 14 MG/DL — SIGNIFICANT CHANGE UP (ref 7–23)
CA-I BLDA-SCNC: 1.02 MMOL/L — LOW (ref 1.15–1.33)
CA-I BLDA-SCNC: 1.07 MMOL/L — LOW (ref 1.15–1.33)
CA-I BLDA-SCNC: 1.07 MMOL/L — LOW (ref 1.15–1.33)
CA-I BLDA-SCNC: 1.08 MMOL/L — LOW (ref 1.15–1.33)
CA-I BLDA-SCNC: 1.08 MMOL/L — LOW (ref 1.15–1.33)
CA-I BLDA-SCNC: 1.15 MMOL/L — SIGNIFICANT CHANGE UP (ref 1.15–1.33)
CA-I BLDA-SCNC: 1.17 MMOL/L — SIGNIFICANT CHANGE UP (ref 1.15–1.33)
CA-I BLDA-SCNC: 1.33 MMOL/L — SIGNIFICANT CHANGE UP (ref 1.15–1.33)
CA-I SERPL-SCNC: 1.13 MMOL/L — LOW (ref 1.15–1.33)
CALCIUM SERPL-MCNC: 8.2 MG/DL — LOW (ref 8.4–10.5)
CALCIUM SERPL-MCNC: 8.6 MG/DL — SIGNIFICANT CHANGE UP (ref 8.4–10.5)
CALCIUM SERPL-MCNC: 9.4 MG/DL — SIGNIFICANT CHANGE UP (ref 8.4–10.5)
CHLORIDE SERPL-SCNC: 102 MMOL/L — SIGNIFICANT CHANGE UP (ref 96–108)
CHLORIDE SERPL-SCNC: 105 MMOL/L — SIGNIFICANT CHANGE UP (ref 96–108)
CHLORIDE SERPL-SCNC: 106 MMOL/L — SIGNIFICANT CHANGE UP (ref 96–108)
CO2 BLDA-SCNC: 22 MMOL/L — SIGNIFICANT CHANGE UP (ref 19–24)
CO2 BLDA-SCNC: 23 MMOL/L — SIGNIFICANT CHANGE UP (ref 19–24)
CO2 BLDA-SCNC: 24 MMOL/L — SIGNIFICANT CHANGE UP (ref 19–24)
CO2 BLDA-SCNC: 25 MMOL/L — HIGH (ref 19–24)
CO2 BLDV-SCNC: 26.2 MMOL/L — HIGH (ref 22–26)
CO2 SERPL-SCNC: 20 MMOL/L — LOW (ref 22–31)
CO2 SERPL-SCNC: 20 MMOL/L — LOW (ref 22–31)
CO2 SERPL-SCNC: 22 MMOL/L — SIGNIFICANT CHANGE UP (ref 22–31)
COHGB MFR BLDA: 1.3 % — SIGNIFICANT CHANGE UP
COHGB MFR BLDA: 1.4 % — SIGNIFICANT CHANGE UP
COHGB MFR BLDA: 1.4 % — SIGNIFICANT CHANGE UP
COHGB MFR BLDA: 1.5 % — SIGNIFICANT CHANGE UP
COHGB MFR BLDA: 1.9 % — SIGNIFICANT CHANGE UP
COHGB MFR BLDV: 1.8 % — SIGNIFICANT CHANGE UP
COLOR SPEC: YELLOW — SIGNIFICANT CHANGE UP
CREAT SERPL-MCNC: 0.86 MG/DL — SIGNIFICANT CHANGE UP (ref 0.5–1.3)
CREAT SERPL-MCNC: 0.88 MG/DL — SIGNIFICANT CHANGE UP (ref 0.5–1.3)
CREAT SERPL-MCNC: 0.95 MG/DL — SIGNIFICANT CHANGE UP (ref 0.5–1.3)
DIFF PNL FLD: ABNORMAL
EGFR: 90 ML/MIN/1.73M2 — SIGNIFICANT CHANGE UP
EGFR: 97 ML/MIN/1.73M2 — SIGNIFICANT CHANGE UP
EGFR: 97 ML/MIN/1.73M2 — SIGNIFICANT CHANGE UP
EOSINOPHIL # BLD AUTO: 0 K/UL — SIGNIFICANT CHANGE UP (ref 0–0.5)
EOSINOPHIL # BLD AUTO: 0.03 K/UL — SIGNIFICANT CHANGE UP (ref 0–0.5)
EOSINOPHIL NFR BLD AUTO: 0 % — SIGNIFICANT CHANGE UP (ref 0–6)
EOSINOPHIL NFR BLD AUTO: 0.1 % — SIGNIFICANT CHANGE UP (ref 0–6)
EPI CELLS # UR: SIGNIFICANT CHANGE UP /HPF (ref 0–5)
GAS PNL BLDA: SIGNIFICANT CHANGE UP
GAS PNL BLDV: 135 MMOL/L — LOW (ref 136–145)
GLUCOSE BLDA-MCNC: 126 MG/DL — HIGH (ref 70–99)
GLUCOSE BLDA-MCNC: 158 MG/DL — HIGH (ref 70–99)
GLUCOSE BLDA-MCNC: 167 MG/DL — HIGH (ref 70–99)
GLUCOSE BLDA-MCNC: 177 MG/DL — HIGH (ref 70–99)
GLUCOSE BLDA-MCNC: 192 MG/DL — HIGH (ref 70–99)
GLUCOSE BLDA-MCNC: 198 MG/DL — HIGH (ref 70–99)
GLUCOSE BLDA-MCNC: 228 MG/DL — HIGH (ref 70–99)
GLUCOSE BLDA-MCNC: 245 MG/DL — HIGH (ref 70–99)
GLUCOSE BLDC GLUCOMTR-MCNC: 190 MG/DL — HIGH (ref 70–99)
GLUCOSE BLDC GLUCOMTR-MCNC: 196 MG/DL — HIGH (ref 70–99)
GLUCOSE BLDV-MCNC: 176 MG/DL — HIGH (ref 70–99)
GLUCOSE SERPL-MCNC: 158 MG/DL — HIGH (ref 70–99)
GLUCOSE SERPL-MCNC: 172 MG/DL — HIGH (ref 70–99)
GLUCOSE SERPL-MCNC: 211 MG/DL — HIGH (ref 70–99)
GLUCOSE UR QL: NEGATIVE — SIGNIFICANT CHANGE UP
HCO3 BLDA-SCNC: 21 MMOL/L — SIGNIFICANT CHANGE UP (ref 21–28)
HCO3 BLDA-SCNC: 22 MMOL/L — SIGNIFICANT CHANGE UP (ref 21–28)
HCO3 BLDA-SCNC: 22 MMOL/L — SIGNIFICANT CHANGE UP (ref 21–28)
HCO3 BLDA-SCNC: 23 MMOL/L — SIGNIFICANT CHANGE UP (ref 21–28)
HCO3 BLDA-SCNC: 23 MMOL/L — SIGNIFICANT CHANGE UP (ref 21–28)
HCO3 BLDA-SCNC: 24 MMOL/L — SIGNIFICANT CHANGE UP (ref 21–28)
HCO3 BLDV-SCNC: 25 MMOL/L — SIGNIFICANT CHANGE UP (ref 22–29)
HCT VFR BLD CALC: 32.7 % — LOW (ref 39–50)
HCT VFR BLD CALC: 35.6 % — LOW (ref 39–50)
HCT VFR BLD CALC: 44.7 % — SIGNIFICANT CHANGE UP (ref 39–50)
HCT VFR BLDA CALC: 33 % — SIGNIFICANT CHANGE UP
HGB BLD CALC-MCNC: 10.9 G/DL — LOW (ref 12.6–17.4)
HGB BLD-MCNC: 10.9 G/DL — LOW (ref 13–17)
HGB BLD-MCNC: 11.9 G/DL — LOW (ref 13–17)
HGB BLD-MCNC: 14.6 G/DL — SIGNIFICANT CHANGE UP (ref 13–17)
HGB BLDA-MCNC: 10.5 G/DL — LOW (ref 12.6–17.4)
HGB BLDA-MCNC: 10.6 G/DL — LOW (ref 12.6–17.4)
HGB BLDA-MCNC: 10.6 G/DL — LOW (ref 12.6–17.4)
HGB BLDA-MCNC: 10.8 G/DL — LOW (ref 12.6–17.4)
HGB BLDA-MCNC: 11.1 G/DL — LOW (ref 12.6–17.4)
HGB BLDA-MCNC: 11.6 G/DL — LOW (ref 12.6–17.4)
HGB BLDA-MCNC: 13.6 G/DL — SIGNIFICANT CHANGE UP (ref 12.6–17.4)
HGB BLDA-MCNC: 13.6 G/DL — SIGNIFICANT CHANGE UP (ref 12.6–17.4)
IMM GRANULOCYTES NFR BLD AUTO: 0.6 % — SIGNIFICANT CHANGE UP (ref 0–0.9)
IMM GRANULOCYTES NFR BLD AUTO: 0.7 % — SIGNIFICANT CHANGE UP (ref 0–0.9)
INR BLD: 1.09 — SIGNIFICANT CHANGE UP (ref 0.85–1.18)
INR BLD: 1.24 — HIGH (ref 0.85–1.18)
INR BLD: 1.37 — HIGH (ref 0.85–1.18)
ISTAT ARTERIAL BE: -1 MMOL/L — SIGNIFICANT CHANGE UP (ref -2–3)
ISTAT ARTERIAL GLUCOSE: 157 MG/DL — HIGH (ref 70–99)
ISTAT ARTERIAL HCO3: 25 MMOL/L — SIGNIFICANT CHANGE UP (ref 22–26)
ISTAT ARTERIAL HEMATOCRIT: 31 % — LOW (ref 39–50)
ISTAT ARTERIAL HEMOGLOBIN: 10.5 G/DL — LOW (ref 13–17)
ISTAT ARTERIAL IONIZED CALCIUM: 1.12 MMOL/L — SIGNIFICANT CHANGE UP (ref 1.12–1.3)
ISTAT ARTERIAL PCO2: 44 MMHG — SIGNIFICANT CHANGE UP (ref 35–45)
ISTAT ARTERIAL PH: 7.36 — SIGNIFICANT CHANGE UP (ref 7.35–7.45)
ISTAT ARTERIAL PO2: 69 MMHG — LOW (ref 80–105)
ISTAT ARTERIAL POTASSIUM: 3.8 MMOL/L — SIGNIFICANT CHANGE UP (ref 3.5–5.3)
ISTAT ARTERIAL SO2: 93 % — LOW (ref 95–98)
ISTAT ARTERIAL SODIUM: 140 MMOL/L — SIGNIFICANT CHANGE UP (ref 135–145)
ISTAT ARTERIAL TCO2: 26 MMOL/L — SIGNIFICANT CHANGE UP (ref 22–31)
KETONES UR-MCNC: 15 MG/DL
LACTATE SERPL-SCNC: 3.2 MMOL/L — HIGH (ref 0.5–2)
LACTATE SERPL-SCNC: 3.2 MMOL/L — HIGH (ref 0.5–2)
LEUKOCYTE ESTERASE UR-ACNC: NEGATIVE — SIGNIFICANT CHANGE UP
LYMPHOCYTES # BLD AUTO: 0.53 K/UL — LOW (ref 1–3.3)
LYMPHOCYTES # BLD AUTO: 1.8 K/UL — SIGNIFICANT CHANGE UP (ref 1–3.3)
LYMPHOCYTES # BLD AUTO: 2.6 % — LOW (ref 13–44)
LYMPHOCYTES # BLD AUTO: 7.5 % — LOW (ref 13–44)
MAGNESIUM SERPL-MCNC: 2.3 MG/DL — SIGNIFICANT CHANGE UP (ref 1.6–2.6)
MAGNESIUM SERPL-MCNC: 2.4 MG/DL — SIGNIFICANT CHANGE UP (ref 1.6–2.6)
MAGNESIUM SERPL-MCNC: 2.6 MG/DL — SIGNIFICANT CHANGE UP (ref 1.6–2.6)
MCHC RBC-ENTMCNC: 30.1 PG — SIGNIFICANT CHANGE UP (ref 27–34)
MCHC RBC-ENTMCNC: 30.5 PG — SIGNIFICANT CHANGE UP (ref 27–34)
MCHC RBC-ENTMCNC: 30.8 PG — SIGNIFICANT CHANGE UP (ref 27–34)
MCHC RBC-ENTMCNC: 32.7 GM/DL — SIGNIFICANT CHANGE UP (ref 32–36)
MCHC RBC-ENTMCNC: 33.3 GM/DL — SIGNIFICANT CHANGE UP (ref 32–36)
MCHC RBC-ENTMCNC: 33.4 GM/DL — SIGNIFICANT CHANGE UP (ref 32–36)
MCV RBC AUTO: 91.3 FL — SIGNIFICANT CHANGE UP (ref 80–100)
MCV RBC AUTO: 92.2 FL — SIGNIFICANT CHANGE UP (ref 80–100)
MCV RBC AUTO: 92.4 FL — SIGNIFICANT CHANGE UP (ref 80–100)
METHGB MFR BLDA: 0.8 % — SIGNIFICANT CHANGE UP
METHGB MFR BLDA: 0.9 % — SIGNIFICANT CHANGE UP
METHGB MFR BLDA: 1.1 % — SIGNIFICANT CHANGE UP
METHGB MFR BLDA: 1.3 % — SIGNIFICANT CHANGE UP
METHGB MFR BLDA: 1.4 % — SIGNIFICANT CHANGE UP
METHGB MFR BLDV: 1.1 % — SIGNIFICANT CHANGE UP
MONOCYTES # BLD AUTO: 1.31 K/UL — HIGH (ref 0–0.9)
MONOCYTES # BLD AUTO: 2.17 K/UL — HIGH (ref 0–0.9)
MONOCYTES NFR BLD AUTO: 6.5 % — SIGNIFICANT CHANGE UP (ref 2–14)
MONOCYTES NFR BLD AUTO: 9.1 % — SIGNIFICANT CHANGE UP (ref 2–14)
NEUTROPHILS # BLD AUTO: 18.22 K/UL — HIGH (ref 1.8–7.4)
NEUTROPHILS # BLD AUTO: 19.73 K/UL — HIGH (ref 1.8–7.4)
NEUTROPHILS NFR BLD AUTO: 82.4 % — HIGH (ref 43–77)
NEUTROPHILS NFR BLD AUTO: 90.2 % — HIGH (ref 43–77)
NITRITE UR-MCNC: NEGATIVE — SIGNIFICANT CHANGE UP
NRBC # BLD: 0 /100 WBCS — SIGNIFICANT CHANGE UP (ref 0–0)
OXYHGB MFR BLDA: 94.3 % — SIGNIFICANT CHANGE UP (ref 90–95)
OXYHGB MFR BLDA: 96.3 % — HIGH (ref 90–95)
OXYHGB MFR BLDA: 96.8 % — HIGH (ref 90–95)
OXYHGB MFR BLDA: 96.9 % — HIGH (ref 90–95)
OXYHGB MFR BLDA: 97.1 % — HIGH (ref 90–95)
OXYHGB MFR BLDA: 97.2 % — HIGH (ref 90–95)
OXYHGB MFR BLDA: 97.4 % — HIGH (ref 90–95)
OXYHGB MFR BLDA: 97.7 % — HIGH (ref 90–95)
PCO2 BLDA: 33 MMHG — LOW (ref 35–48)
PCO2 BLDA: 35 MMHG — SIGNIFICANT CHANGE UP (ref 35–48)
PCO2 BLDA: 35 MMHG — SIGNIFICANT CHANGE UP (ref 35–48)
PCO2 BLDA: 36 MMHG — SIGNIFICANT CHANGE UP (ref 35–48)
PCO2 BLDA: 37 MMHG — SIGNIFICANT CHANGE UP (ref 35–48)
PCO2 BLDA: 37 MMHG — SIGNIFICANT CHANGE UP (ref 35–48)
PCO2 BLDA: 40 MMHG — SIGNIFICANT CHANGE UP (ref 35–48)
PCO2 BLDA: 40 MMHG — SIGNIFICANT CHANGE UP (ref 35–48)
PCO2 BLDV: 43 MMHG — SIGNIFICANT CHANGE UP (ref 42–55)
PH BLDA: 7.33 — LOW (ref 7.35–7.45)
PH BLDA: 7.35 — SIGNIFICANT CHANGE UP (ref 7.35–7.45)
PH BLDA: 7.37 — SIGNIFICANT CHANGE UP (ref 7.35–7.45)
PH BLDA: 7.39 — SIGNIFICANT CHANGE UP (ref 7.35–7.45)
PH BLDA: 7.42 — SIGNIFICANT CHANGE UP (ref 7.35–7.45)
PH BLDA: 7.42 — SIGNIFICANT CHANGE UP (ref 7.35–7.45)
PH BLDA: 7.43 — SIGNIFICANT CHANGE UP (ref 7.35–7.45)
PH BLDA: 7.45 — SIGNIFICANT CHANGE UP (ref 7.35–7.45)
PH BLDV: 7.37 — SIGNIFICANT CHANGE UP (ref 7.32–7.43)
PH UR: 5.5 — SIGNIFICANT CHANGE UP (ref 5–8)
PHOSPHATE SERPL-MCNC: 2.9 MG/DL — SIGNIFICANT CHANGE UP (ref 2.5–4.5)
PHOSPHATE SERPL-MCNC: 3.8 MG/DL — SIGNIFICANT CHANGE UP (ref 2.5–4.5)
PLATELET # BLD AUTO: 214 K/UL — SIGNIFICANT CHANGE UP (ref 150–400)
PLATELET # BLD AUTO: 226 K/UL — SIGNIFICANT CHANGE UP (ref 150–400)
PLATELET # BLD AUTO: 234 K/UL — SIGNIFICANT CHANGE UP (ref 150–400)
PO2 BLDA: 142 MMHG — HIGH (ref 83–108)
PO2 BLDA: 220 MMHG — HIGH (ref 83–108)
PO2 BLDA: 282 MMHG — HIGH (ref 83–108)
PO2 BLDA: 423 MMHG — HIGH (ref 83–108)
PO2 BLDA: 453 MMHG — HIGH (ref 83–108)
PO2 BLDA: 457 MMHG — HIGH (ref 83–108)
PO2 BLDA: 484 MMHG — HIGH (ref 83–108)
PO2 BLDA: 80 MMHG — LOW (ref 83–108)
PO2 BLDV: 50 MMHG — HIGH (ref 25–45)
POTASSIUM BLDA-SCNC: 3.9 MMOL/L — SIGNIFICANT CHANGE UP (ref 3.5–5.1)
POTASSIUM BLDA-SCNC: 4.1 MMOL/L — SIGNIFICANT CHANGE UP (ref 3.5–5.1)
POTASSIUM BLDA-SCNC: 4.4 MMOL/L — SIGNIFICANT CHANGE UP (ref 3.5–5.1)
POTASSIUM BLDA-SCNC: 4.5 MMOL/L — SIGNIFICANT CHANGE UP (ref 3.5–5.1)
POTASSIUM BLDA-SCNC: 4.6 MMOL/L — SIGNIFICANT CHANGE UP (ref 3.5–5.1)
POTASSIUM BLDA-SCNC: 4.8 MMOL/L — SIGNIFICANT CHANGE UP (ref 3.5–5.1)
POTASSIUM BLDA-SCNC: 5 MMOL/L — SIGNIFICANT CHANGE UP (ref 3.5–5.1)
POTASSIUM BLDA-SCNC: 5 MMOL/L — SIGNIFICANT CHANGE UP (ref 3.5–5.1)
POTASSIUM BLDV-SCNC: 5 MMOL/L — SIGNIFICANT CHANGE UP (ref 3.5–5.1)
POTASSIUM SERPL-MCNC: 3.7 MMOL/L — SIGNIFICANT CHANGE UP (ref 3.5–5.3)
POTASSIUM SERPL-MCNC: 3.8 MMOL/L — SIGNIFICANT CHANGE UP (ref 3.5–5.3)
POTASSIUM SERPL-MCNC: 4.7 MMOL/L — SIGNIFICANT CHANGE UP (ref 3.5–5.3)
POTASSIUM SERPL-SCNC: 3.7 MMOL/L — SIGNIFICANT CHANGE UP (ref 3.5–5.3)
POTASSIUM SERPL-SCNC: 3.8 MMOL/L — SIGNIFICANT CHANGE UP (ref 3.5–5.3)
POTASSIUM SERPL-SCNC: 4.7 MMOL/L — SIGNIFICANT CHANGE UP (ref 3.5–5.3)
PROT SERPL-MCNC: 5.4 G/DL — LOW (ref 6–8.3)
PROT SERPL-MCNC: 5.4 G/DL — LOW (ref 6–8.3)
PROT UR-MCNC: NEGATIVE MG/DL — SIGNIFICANT CHANGE UP
PROTHROM AB SERPL-ACNC: 12.4 SEC — SIGNIFICANT CHANGE UP (ref 9.5–13)
PROTHROM AB SERPL-ACNC: 14.1 SEC — HIGH (ref 9.5–13)
PROTHROM AB SERPL-ACNC: 15.5 SEC — HIGH (ref 9.5–13)
RBC # BLD: 3.54 M/UL — LOW (ref 4.2–5.8)
RBC # BLD: 3.9 M/UL — LOW (ref 4.2–5.8)
RBC # BLD: 4.85 M/UL — SIGNIFICANT CHANGE UP (ref 4.2–5.8)
RBC # FLD: 12.6 % — SIGNIFICANT CHANGE UP (ref 10.3–14.5)
RBC # FLD: 12.6 % — SIGNIFICANT CHANGE UP (ref 10.3–14.5)
RBC # FLD: 12.8 % — SIGNIFICANT CHANGE UP (ref 10.3–14.5)
RBC CASTS # UR COMP ASSIST: < 5 /HPF — SIGNIFICANT CHANGE UP
SAO2 % BLDA: 100 % — HIGH (ref 94–98)
SAO2 % BLDA: 96.9 % — SIGNIFICANT CHANGE UP (ref 94–98)
SAO2 % BLDA: 98.9 % — HIGH (ref 94–98)
SAO2 % BLDA: 99.4 % — HIGH (ref 94–98)
SAO2 % BLDA: 99.6 % — HIGH (ref 94–98)
SAO2 % BLDA: 99.7 % — HIGH (ref 94–98)
SAO2 % BLDV: 81 % — SIGNIFICANT CHANGE UP (ref 67–88)
SODIUM BLDA-SCNC: 132 MMOL/L — LOW (ref 136–145)
SODIUM BLDA-SCNC: 134 MMOL/L — LOW (ref 136–145)
SODIUM BLDA-SCNC: 134 MMOL/L — LOW (ref 136–145)
SODIUM BLDA-SCNC: 136 MMOL/L — SIGNIFICANT CHANGE UP (ref 136–145)
SODIUM BLDA-SCNC: 136 MMOL/L — SIGNIFICANT CHANGE UP (ref 136–145)
SODIUM BLDA-SCNC: 137 MMOL/L — SIGNIFICANT CHANGE UP (ref 136–145)
SODIUM BLDA-SCNC: 137 MMOL/L — SIGNIFICANT CHANGE UP (ref 136–145)
SODIUM BLDA-SCNC: 140 MMOL/L — SIGNIFICANT CHANGE UP (ref 136–145)
SODIUM SERPL-SCNC: 135 MMOL/L — SIGNIFICANT CHANGE UP (ref 135–145)
SODIUM SERPL-SCNC: 140 MMOL/L — SIGNIFICANT CHANGE UP (ref 135–145)
SODIUM SERPL-SCNC: 140 MMOL/L — SIGNIFICANT CHANGE UP (ref 135–145)
SP GR SPEC: 1.02 — SIGNIFICANT CHANGE UP (ref 1–1.03)
UROBILINOGEN FLD QL: 0.2 E.U./DL — SIGNIFICANT CHANGE UP
WBC # BLD: 11.8 K/UL — HIGH (ref 3.8–10.5)
WBC # BLD: 20.21 K/UL — HIGH (ref 3.8–10.5)
WBC # BLD: 23.93 K/UL — HIGH (ref 3.8–10.5)
WBC # FLD AUTO: 11.8 K/UL — HIGH (ref 3.8–10.5)
WBC # FLD AUTO: 20.21 K/UL — HIGH (ref 3.8–10.5)
WBC # FLD AUTO: 23.93 K/UL — HIGH (ref 3.8–10.5)
WBC UR QL: > 10 /HPF

## 2023-08-10 PROCEDURE — 33257 ABLATE ATRIA LMTD ADD-ON: CPT | Mod: AS

## 2023-08-10 PROCEDURE — 33518 CABG ARTERY-VEIN TWO: CPT

## 2023-08-10 PROCEDURE — 93010 ELECTROCARDIOGRAM REPORT: CPT

## 2023-08-10 PROCEDURE — 99291 CRITICAL CARE FIRST HOUR: CPT

## 2023-08-10 PROCEDURE — 33533 CABG ARTERIAL SINGLE: CPT

## 2023-08-10 PROCEDURE — 33257 ABLATE ATRIA LMTD ADD-ON: CPT

## 2023-08-10 PROCEDURE — 99292 CRITICAL CARE ADDL 30 MIN: CPT

## 2023-08-10 PROCEDURE — 71045 X-RAY EXAM CHEST 1 VIEW: CPT | Mod: 26

## 2023-08-10 PROCEDURE — 33533 CABG ARTERIAL SINGLE: CPT | Mod: AS

## 2023-08-10 PROCEDURE — 33518 CABG ARTERY-VEIN TWO: CPT | Mod: AS

## 2023-08-10 DEVICE — ATRICLIP LAA EXCLUSION DEVICE 35MM FLEX: Type: IMPLANTABLE DEVICE | Status: FUNCTIONAL

## 2023-08-10 DEVICE — CHEST DRAIN THORACIC PVC 32FR: Type: IMPLANTABLE DEVICE | Status: FUNCTIONAL

## 2023-08-10 DEVICE — LIGATING CLIPS WECK HORIZON SMALL (YELLOW) 24: Type: IMPLANTABLE DEVICE | Status: FUNCTIONAL

## 2023-08-10 DEVICE — CATH VENT LEFT HEART SILICONE 20FR NON-VENTED: Type: IMPLANTABLE DEVICE | Status: FUNCTIONAL

## 2023-08-10 DEVICE — CATH SILICONE THORACIC STR 24FR 20/BX: Type: IMPLANTABLE DEVICE | Status: FUNCTIONAL

## 2023-08-10 DEVICE — CLIP APPLIER ETHICON LIGACLIP 9 3/8" SMALL: Type: IMPLANTABLE DEVICE | Status: FUNCTIONAL

## 2023-08-10 DEVICE — LIGATING CLIPS WECK HORIZON SMALL-WIDE (RED) 24: Type: IMPLANTABLE DEVICE | Status: FUNCTIONAL

## 2023-08-10 DEVICE — CATH VENT LEFT HEART SILICONE 16FR NON-VENTED: Type: IMPLANTABLE DEVICE | Status: FUNCTIONAL

## 2023-08-10 DEVICE — PACING WIRE STREAMLINE BIPOLAR MYOCARDIAL: Type: IMPLANTABLE DEVICE | Status: FUNCTIONAL

## 2023-08-10 DEVICE — LIGATING CLIPS WECK HORIZON MEDIUM (BLUE) 24: Type: IMPLANTABLE DEVICE | Status: FUNCTIONAL

## 2023-08-10 DEVICE — CANNULA VENOUS 3 STAGE STANDARD 29/37/37FR X 1/2": Type: IMPLANTABLE DEVICE | Status: FUNCTIONAL

## 2023-08-10 DEVICE — CANNULA RCSP 15FR X 12.5" AUTO-INFLATE CUFF WITH GUIDEWIRE STYLET: Type: IMPLANTABLE DEVICE | Status: FUNCTIONAL

## 2023-08-10 DEVICE — KIT CVC 3LUM SPECTRUM 9FR: Type: IMPLANTABLE DEVICE | Status: FUNCTIONAL

## 2023-08-10 RX ORDER — VASOPRESSIN 20 [USP'U]/ML
0.01 INJECTION INTRAVENOUS
Qty: 40 | Refills: 0 | Status: DISCONTINUED | OUTPATIENT
Start: 2023-08-10 | End: 2023-08-11

## 2023-08-10 RX ORDER — ALBUMIN HUMAN 25 %
250 VIAL (ML) INTRAVENOUS
Refills: 0 | Status: COMPLETED | OUTPATIENT
Start: 2023-08-10 | End: 2023-08-10

## 2023-08-10 RX ORDER — POLYETHYLENE GLYCOL 3350 17 G/17G
17 POWDER, FOR SOLUTION ORAL DAILY
Refills: 0 | Status: DISCONTINUED | OUTPATIENT
Start: 2023-08-11 | End: 2023-08-15

## 2023-08-10 RX ORDER — ACETAMINOPHEN 500 MG
1000 TABLET ORAL EVERY 6 HOURS
Refills: 0 | Status: COMPLETED | OUTPATIENT
Start: 2023-08-10 | End: 2023-08-11

## 2023-08-10 RX ORDER — LANOLIN ALCOHOL/MO/W.PET/CERES
5 CREAM (GRAM) TOPICAL AT BEDTIME
Refills: 0 | Status: DISCONTINUED | OUTPATIENT
Start: 2023-08-10 | End: 2023-08-10

## 2023-08-10 RX ORDER — DEXTROSE 50 % IN WATER 50 %
50 SYRINGE (ML) INTRAVENOUS
Refills: 0 | Status: DISCONTINUED | OUTPATIENT
Start: 2023-08-10 | End: 2023-08-14

## 2023-08-10 RX ORDER — INSULIN HUMAN 100 [IU]/ML
2 INJECTION, SOLUTION SUBCUTANEOUS
Qty: 50 | Refills: 0 | Status: DISCONTINUED | OUTPATIENT
Start: 2023-08-10 | End: 2023-08-11

## 2023-08-10 RX ORDER — MUPIROCIN 20 MG/G
1 OINTMENT TOPICAL
Refills: 0 | Status: DISCONTINUED | OUTPATIENT
Start: 2023-08-10 | End: 2023-08-15

## 2023-08-10 RX ORDER — NOREPINEPHRINE BITARTRATE/D5W 8 MG/250ML
0.05 PLASTIC BAG, INJECTION (ML) INTRAVENOUS
Qty: 8 | Refills: 0 | Status: DISCONTINUED | OUTPATIENT
Start: 2023-08-10 | End: 2023-08-11

## 2023-08-10 RX ORDER — HEPARIN SODIUM 5000 [USP'U]/ML
5000 INJECTION INTRAVENOUS; SUBCUTANEOUS EVERY 8 HOURS
Refills: 0 | Status: DISCONTINUED | OUTPATIENT
Start: 2023-08-10 | End: 2023-08-15

## 2023-08-10 RX ORDER — POTASSIUM CHLORIDE 20 MEQ
40 PACKET (EA) ORAL ONCE
Refills: 0 | Status: COMPLETED | OUTPATIENT
Start: 2023-08-10 | End: 2023-08-10

## 2023-08-10 RX ORDER — SODIUM CHLORIDE 9 MG/ML
1000 INJECTION INTRAMUSCULAR; INTRAVENOUS; SUBCUTANEOUS ONCE
Refills: 0 | Status: COMPLETED | OUTPATIENT
Start: 2023-08-10 | End: 2023-08-10

## 2023-08-10 RX ORDER — CEFAZOLIN SODIUM 1 G
2000 VIAL (EA) INJECTION EVERY 8 HOURS
Refills: 0 | Status: COMPLETED | OUTPATIENT
Start: 2023-08-10 | End: 2023-08-12

## 2023-08-10 RX ORDER — ASPIRIN/CALCIUM CARB/MAGNESIUM 324 MG
81 TABLET ORAL DAILY
Refills: 0 | Status: DISCONTINUED | OUTPATIENT
Start: 2023-08-10 | End: 2023-08-15

## 2023-08-10 RX ORDER — CHLORHEXIDINE GLUCONATE 213 G/1000ML
15 SOLUTION TOPICAL EVERY 12 HOURS
Refills: 0 | Status: DISCONTINUED | OUTPATIENT
Start: 2023-08-10 | End: 2023-08-11

## 2023-08-10 RX ORDER — ASCORBIC ACID 60 MG
500 TABLET,CHEWABLE ORAL
Refills: 0 | Status: DISCONTINUED | OUTPATIENT
Start: 2023-08-10 | End: 2023-08-15

## 2023-08-10 RX ORDER — EPINEPHRINE 0.3 MG/.3ML
0.03 INJECTION INTRAMUSCULAR; SUBCUTANEOUS
Qty: 4 | Refills: 0 | Status: DISCONTINUED | OUTPATIENT
Start: 2023-08-10 | End: 2023-08-11

## 2023-08-10 RX ORDER — POTASSIUM CHLORIDE 20 MEQ
20 PACKET (EA) ORAL
Refills: 0 | Status: COMPLETED | OUTPATIENT
Start: 2023-08-10 | End: 2023-08-11

## 2023-08-10 RX ORDER — SENNA PLUS 8.6 MG/1
2 TABLET ORAL AT BEDTIME
Refills: 0 | Status: DISCONTINUED | OUTPATIENT
Start: 2023-08-11 | End: 2023-08-15

## 2023-08-10 RX ORDER — DEXTROSE 50 % IN WATER 50 %
25 SYRINGE (ML) INTRAVENOUS
Refills: 0 | Status: DISCONTINUED | OUTPATIENT
Start: 2023-08-10 | End: 2023-08-14

## 2023-08-10 RX ORDER — SODIUM CHLORIDE 9 MG/ML
1000 INJECTION INTRAMUSCULAR; INTRAVENOUS; SUBCUTANEOUS
Refills: 0 | Status: DISCONTINUED | OUTPATIENT
Start: 2023-08-10 | End: 2023-08-15

## 2023-08-10 RX ORDER — METHADONE HYDROCHLORIDE 40 MG/1
20 TABLET ORAL ONCE
Refills: 0 | Status: DISCONTINUED | OUTPATIENT
Start: 2023-08-10 | End: 2023-08-10

## 2023-08-10 RX ORDER — EPINEPHRINE 0.3 MG/.3ML
0.04 INJECTION INTRAMUSCULAR; SUBCUTANEOUS
Qty: 4 | Refills: 0 | Status: DISCONTINUED | OUTPATIENT
Start: 2023-08-10 | End: 2023-08-10

## 2023-08-10 RX ORDER — DEXMEDETOMIDINE HYDROCHLORIDE IN 0.9% SODIUM CHLORIDE 4 UG/ML
0.1 INJECTION INTRAVENOUS
Qty: 400 | Refills: 0 | Status: DISCONTINUED | OUTPATIENT
Start: 2023-08-10 | End: 2023-08-11

## 2023-08-10 RX ORDER — CALCIUM GLUCONATE 100 MG/ML
2 VIAL (ML) INTRAVENOUS ONCE
Refills: 0 | Status: COMPLETED | OUTPATIENT
Start: 2023-08-10 | End: 2023-08-11

## 2023-08-10 RX ORDER — CHLORHEXIDINE GLUCONATE 213 G/1000ML
1 SOLUTION TOPICAL DAILY
Refills: 0 | Status: DISCONTINUED | OUTPATIENT
Start: 2023-08-10 | End: 2023-08-14

## 2023-08-10 RX ORDER — PANTOPRAZOLE SODIUM 20 MG/1
40 TABLET, DELAYED RELEASE ORAL DAILY
Refills: 0 | Status: DISCONTINUED | OUTPATIENT
Start: 2023-08-10 | End: 2023-08-12

## 2023-08-10 RX ORDER — ATORVASTATIN CALCIUM 80 MG/1
80 TABLET, FILM COATED ORAL AT BEDTIME
Refills: 0 | Status: DISCONTINUED | OUTPATIENT
Start: 2023-08-10 | End: 2023-08-13

## 2023-08-10 RX ORDER — CALCIUM GLUCONATE 100 MG/ML
2 VIAL (ML) INTRAVENOUS ONCE
Refills: 0 | Status: COMPLETED | OUTPATIENT
Start: 2023-08-10 | End: 2023-08-10

## 2023-08-10 RX ORDER — FENTANYL CITRATE 50 UG/ML
25 INJECTION INTRAVENOUS
Refills: 0 | Status: DISCONTINUED | OUTPATIENT
Start: 2023-08-10 | End: 2023-08-11

## 2023-08-10 RX ADMIN — CHLORHEXIDINE GLUCONATE 15 MILLILITER(S): 213 SOLUTION TOPICAL at 06:06

## 2023-08-10 RX ADMIN — SODIUM CHLORIDE 3 MILLILITER(S): 9 INJECTION INTRAMUSCULAR; INTRAVENOUS; SUBCUTANEOUS at 05:32

## 2023-08-10 RX ADMIN — Medication 500 MILLILITER(S): at 20:26

## 2023-08-10 RX ADMIN — CHLORHEXIDINE GLUCONATE 1 APPLICATION(S): 213 SOLUTION TOPICAL at 06:07

## 2023-08-10 RX ADMIN — Medication 200 GRAM(S): at 23:22

## 2023-08-10 RX ADMIN — Medication 100 MILLIGRAM(S): at 22:29

## 2023-08-10 RX ADMIN — Medication 500 MILLILITER(S): at 19:07

## 2023-08-10 RX ADMIN — Medication 50 MILLIEQUIVALENT(S): at 21:09

## 2023-08-10 RX ADMIN — SODIUM CHLORIDE 1000 MILLILITER(S): 9 INJECTION INTRAMUSCULAR; INTRAVENOUS; SUBCUTANEOUS at 22:00

## 2023-08-10 RX ADMIN — CLOTRIMAZOLE AND BETAMETHASONE DIPROPIONATE 1 APPLICATION(S): 10; .5 CREAM TOPICAL at 06:09

## 2023-08-10 RX ADMIN — PANTOPRAZOLE SODIUM 40 MILLIGRAM(S): 20 TABLET, DELAYED RELEASE ORAL at 21:09

## 2023-08-10 RX ADMIN — FENTANYL CITRATE 25 MICROGRAM(S): 50 INJECTION INTRAVENOUS at 22:56

## 2023-08-10 RX ADMIN — Medication 500 MILLILITER(S): at 23:10

## 2023-08-10 RX ADMIN — Medication 5 MILLIGRAM(S): at 00:52

## 2023-08-10 RX ADMIN — Medication 40 MILLIEQUIVALENT(S): at 08:29

## 2023-08-10 RX ADMIN — Medication 500 MILLILITER(S): at 23:30

## 2023-08-10 RX ADMIN — Medication 650 MILLIGRAM(S): at 06:26

## 2023-08-10 RX ADMIN — HEPARIN SODIUM 15.5 UNIT(S)/HR: 5000 INJECTION INTRAVENOUS; SUBCUTANEOUS at 06:11

## 2023-08-10 RX ADMIN — Medication 50 MILLIEQUIVALENT(S): at 20:02

## 2023-08-10 RX ADMIN — MUPIROCIN 1 APPLICATION(S): 20 OINTMENT TOPICAL at 06:07

## 2023-08-10 RX ADMIN — Medication 1000 MILLIGRAM(S): at 10:37

## 2023-08-10 RX ADMIN — FENTANYL CITRATE 25 MICROGRAM(S): 50 INJECTION INTRAVENOUS at 23:20

## 2023-08-10 RX ADMIN — Medication 12.5 MILLIGRAM(S): at 06:06

## 2023-08-10 RX ADMIN — Medication 650 MILLIGRAM(S): at 07:03

## 2023-08-10 NOTE — BRIEF OPERATIVE NOTE - COMMENTS
I first assisted for the entirety of the case, including but not limited to conduit harvest, cannulation, distal/proximal anastamoses, and decannulation, and chest closure. I first assisted for the entirety of the case, including but not limited to conduit harvest, cannulation, distal/proximal anastamoses, and decannulation, and chest closure.    Arrived to [facility] intubated, HD stable on Epi/Levo/Vaso

## 2023-08-10 NOTE — PRE-ANESTHESIA EVALUATION ADULT - LAST ECHOCARDIOGRAM
8/9/23 report reviewed.  LVH, borderline LV dysfunction, EF 50-55%, abnormal septal wall motion.  NL RV, moderately dilated LA, +PFO, no R->L shunt , mild MR, trace TR

## 2023-08-10 NOTE — PROGRESS NOTE ADULT - SUBJECTIVE AND OBJECTIVE BOX
CTICU  CRITICAL  CARE  attending     Hand off received 					   Pertinent clinical, laboratory, radiographic, hemodynamic, echocardiographic, respiratory data, microbiologic data and chart were reviewed and analyzed frequently throughout the course of the day and night.    63 year old Male with HTN, HLD, new onset AF (on Eliquis) and past spinal surgery for L4-L5 herniation, spinal stenosis.   Initially sent to ED by primary provider for new 's noted during office visit. In ED rate control achieved with IV Diltiazem bolus followed by PO regimen.   He was admitted for ischemic work up.   Nuclear Stress Teast was positive for ischemia.  Echo: LVEF 45-50%, No significant Valvular Heart Disease.   Cardiac Cath by Dr. Quevedo showed severe triple vessel CAD (hyper-dominant RCA, occluded LAD and occluded OM1).     S/P CABG x 3.       FAMILY HISTORY:  PAST MEDICAL & SURGICAL HISTORY:  CAD (coronary artery disease)  HTN (hypertension)  Atrial fibrillation        14 system review was unremarkable    Vital signs, hemodynamic and respiratory parameters were reviewed from the bedside nursing flow sheet.  ICU Vital Signs Last 24 Hrs  T(C): 35.8 (10 Aug 2023 20:55), Max: 37.2 (10 Aug 2023 09:04)  T(F): 96.4 (10 Aug 2023 20:55), Max: 98.9 (10 Aug 2023 09:04)  HR: 77 (10 Aug 2023 21:00) (73 - 99)  BP: 114/64 (10 Aug 2023 21:00) (114/64 - 145/87)  BP(mean): 83 (10 Aug 2023 21:00) (83 - 97)  ABP: 104/59 (10 Aug 2023 21:00) (83/59 - 117/63)  ABP(mean): 76 (10 Aug 2023 21:00) (70 - 85)  RR: 12 (10 Aug 2023 21:00) (12 - 18)  SpO2: 97% (10 Aug 2023 21:00) (95% - 100%)    O2 Parameters below as of 10 Aug 2023 21:00  Patient On (Oxygen Delivery Method): ventilator    O2 Concentration (%): 80      Adult Advanced Hemodynamics Last 24 Hrs  CVP(mm Hg): 11 (10 Aug 2023 21:00) (7 - 13)  CVP(cm H2O): --  CO: --  CI: --  PA: --  PA(mean): --  PCWP: --  SVR: --  SVRI: --  PVR: --  PVRI: --, ABG - ( 10 Aug 2023 21:22 )  pH, Arterial: 7.36  pH, Blood: x     /  pCO2: 31    /  pO2: 88    / HCO3: 18    / Base Excess: -6.8  /  SaO2: 97.5              Mode: AC/ CMV (Assist Control/ Continuous Mandatory Ventilation)  RR (machine): 12  TV (machine): 600  FiO2: 80  PEEP: 10  ITime: 1  MAP: 12  PIP: 18    Intake and output was reviewed and the fluid balance was calculated  Daily Height in cm: 180.34 (10 Aug 2023 10:42)    Daily   I&O's Summary    09 Aug 2023 07:01  -  10 Aug 2023 07:00  --------------------------------------------------------  IN: 927.5 mL / OUT: 1100 mL / NET: -172.5 mL    10 Aug 2023 07:01  -  10 Aug 2023 21:32  --------------------------------------------------------  IN: 597.5 mL / OUT: 880 mL / NET: -282.5 mL        All lines and drain sites were assessed    Neuro: No change in the Neuro status from the baseline.   Neck: No JVD.  CVS: S1, S2, No S3.  Lungs: Good air entry bilaterally.  Abd: Soft. No tenderness. + Bowel sounds.  Vascular: + DP/PT.  Extremities: No edema.  Lymphatic: Normal.  Skin: No abnormalities.      labs  CBC Full  -  ( 10 Aug 2023 19:05 )  WBC Count : 23.93 K/uL  RBC Count : 3.90 M/uL  Hemoglobin : 11.9 g/dL  Hematocrit : 35.6 %  Platelet Count - Automated : 226 K/uL  Mean Cell Volume : 91.3 fl  Mean Cell Hemoglobin : 30.5 pg  Mean Cell Hemoglobin Concentration : 33.4 gm/dL  Auto Neutrophil # : 19.73 K/uL  Auto Lymphocyte # : 1.80 K/uL  Auto Monocyte # : 2.17 K/uL  Auto Eosinophil # : 0.03 K/uL  Auto Basophil # : 0.04 K/uL  Auto Neutrophil % : 82.4 %  Auto Lymphocyte % : 7.5 %  Auto Monocyte % : 9.1 %  Auto Eosinophil % : 0.1 %  Auto Basophil % : 0.2 %    08-10    140  |  105  |  14  ----------------------------<  172<H>  3.7   |  22  |  0.95    Ca    8.6      10 Aug 2023 19:05  Phos  3.8     08-10  Mg     2.6     08-10    TPro  5.4<L>  /  Alb  3.5  /  TBili  0.9  /  DBili  x   /  AST  50<H>  /  ALT  26  /  AlkPhos  53  08-10    PT/INR - ( 10 Aug 2023 19:05 )   PT: 14.1 sec;   INR: 1.24          PTT - ( 10 Aug 2023 19:05 )  PTT:24.5 sec  The current medications were reviewed   MEDICATIONS  (STANDING):  acetaminophen   IVPB .. 1000 milliGRAM(s) IV Intermittent every 6 hours  ascorbic acid 500 milliGRAM(s) Oral two times a day  aspirin enteric coated 81 milliGRAM(s) Oral daily  atorvastatin 80 milliGRAM(s) Oral at bedtime  ceFAZolin   IVPB 2000 milliGRAM(s) IV Intermittent every 8 hours  chlorhexidine 0.12% Liquid 15 milliLiter(s) Oral Mucosa every 12 hours  chlorhexidine 2% Cloths 1 Application(s) Topical daily  dexMEDEtomidine Infusion 0.1 MICROgram(s)/kG/Hr (2.64 mL/Hr) IV Continuous <Continuous>  dextrose 50% Injectable 50 milliLiter(s) IV Push every 15 minutes  dextrose 50% Injectable 25 milliLiter(s) IV Push every 15 minutes  EPINEPHrine    Infusion 0.03 MICROgram(s)/kG/Min (11.9 mL/Hr) IV Continuous <Continuous>  heparin   Injectable 5000 Unit(s) SubCutaneous every 8 hours  insulin regular Infusion 2 Unit(s)/Hr (2 mL/Hr) IV Continuous <Continuous>  mupirocin 2% Ointment 1 Application(s) Both Nostrils two times a day  norepinephrine Infusion 0.05 MICROgram(s)/kG/Min (9.9 mL/Hr) IV Continuous <Continuous>  pantoprazole  Injectable 40 milliGRAM(s) IV Push daily  potassium chloride  20 mEq/100 mL IVPB 20 milliEquivalent(s) IV Intermittent every 2 hours  sodium chloride 0.9%. 1000 milliLiter(s) (10 mL/Hr) IV Continuous <Continuous>  vasopressin Infusion 0.01 Unit(s)/Min (1.5 mL/Hr) IV Continuous <Continuous>    MEDICATIONS  (PRN):  fentaNYL    Injectable 25 MICROGram(s) IV Push every 3 hours PRN Severe Pain (7 - 10)            63 year old  Male admitted with triple vessel CAD   S/P CABG x 3. LIMA to LAD, SVG to OM, SVG to RCA).  S/P Exclusion of LA appendage.  S/P isolation of pulmonary veins.   Hyperglycemia.  Metabolic acidosis.  Hemodynamically stable.  Good oxygenation.  Fair urine out put.        My plan includes :  WEAN vent as tolerated.  Statin Rx.  D/C vasopressin.  OPTIMIZE Glycemic control.  WEAN off epinephrine and norepinephrine.  Thyroid Replacement Rx.  Close hemodynamic, ventilatory and drain monitoring and management  Monitor for arrhythmias and monitor parameters for organ perfusion  Monitor neurologic status  Monitor renal function.  Head of the bed should remain elevated to 45 deg .   Chest PT and IS will be encouraged  Monitor adequacy of oxygenation and ventilation and attempt to wean oxygen  Nutritional goals will be met using po eventually , ensure adequate caloric intake and monitor the same  Stress ulcer and VTE prophylaxis will be achieved    Electrolytes have been repleted as necessary and wound care has been carried out. Pain control has been achieved.   Aggressive physical therapy and early mobility and ambulation goals will be met   The family was updated about the course and plan  CRITICAL CARE TIME SPENT in evaluation and management, reassessments, review and interpretation of labs and x-rays, ventilator and hemodynamic management, formulating a plan and coordinating care: ___90____ MIN.  Time does not include procedural time.  CTICU ATTENDING     					    Zurdo Jensen MD

## 2023-08-10 NOTE — PRE-ANESTHESIA EVALUATION ADULT - NSANTHADDINFOFT_GEN_ALL_CORE
H&P Adult [Charted Location: [address] [address]] [Authored: 08-Aug-2023 18:06]- for Visit: [id number], Complete, Not Revised, Signed in Full, Available to Patient    History and Physical:   Source of Information	Chart(s), Patient       Patient Identity:  · Birth Sex	Male     History of Present Illness:  Reason for Admission: MvCAD  History of Present Illness:   62yo M with past medical history significant for HTN, HLD, new onset AF (on Eliquis) and past spinal surgery for L4-L5 herniation, spinal stenosis. Initially sent to ED by primary provider for new 's noted during office visit. In ED rate control achieved w/ IV cardizem bolus followed by PO regimen. Admitted for ischemic w/up. NST abnormal. 8/8 echo with LVEF 45-50%, no significant VHD. Cath by Dr. Quevedo w/ evidence of severe triple vessel CAD (hyper-dominant RCA, occluded LAD and occluded OM1). Given study results decision made to transfer patient to our facility under the care of Dr. Quintero for CABG w/up. Of note, patient with rash extending on left inner thigh to groin and again on upper right extremity. At this time he denies chest pain, sob, nazario, reduced ET, fatigue, dizziness, syncope, fever.        Review of Systems:  Review of Systems: Review of Systems  CONSTITUTIONAL:  Denies Fevers / chills, sweats, fatigue, weight loss, weight gain                                      NEURO:  Denies changes in sensation, seizures, syncope, confusion                                                                            EYES:  Denies Blurry vision, discharge, pain, loss of vision                                                                                    ENT:  Denies Difficulty hearing, vertigo, dysphagia, epistaxis, recent dental work                                       CV:  Denies Chest pain, palpitations, NAZARIO, orthopnea                                                                                          RESPIRATORY:  Denies Wheezing, SOB, cough / sputum, hemoptysis                                                                GI:  Denies Nausea, vomiting, diarrhea, constipation, melena, difficulty swallowing                                               : Denies Hematuria, dysuria, urgency, incontinence                                                                                         MUSCULOSKELETAL:  Denies arthritis, joint swelling, muscle weakness                                                             SKIN/BREAST:  + Itchy rash on left thigh, groin and right upper arm. Denies hair loss, masses                                                                                            PSYCH:  Denies depression, anxiety, suicidal ideation                                                                                               HEME/LYMPH:  Denies bruises easily, enlarged lymph nodes, tender lymph nodes                                        ENDOCRINE:  Denies cold intolerance, heat intolerance, polydipsia      Allergies and Intolerances:        Allergies:  	Allergy Status Unknown:     Home Medications:   * Patient Currently Takes Medications as of 09-Aug-2023 02:46 documented in Structured Notes  · 	amLODIPine 10 mg oral tablet: Last Dose Taken:  , 1 tab(s) orally once a day  · 	Lipitor 10 mg oral tablet: Last Dose Taken:  , 1 tab(s) orally once a day    Patient History:    Past Medical, Past Surgical, and Family History:  PAST MEDICAL HISTORY:  Atrial fibrillation     CAD (coronary artery disease)     HTN (hypertension).     Social History:  · Substance use	No     Tobacco Screening:  · Core Measure Site	No  · Has the patient used tobacco in the past 30 days?	No    Risk Assessment:    Present on Admission:  Deep Venous Thrombosis	no  Pulmonary Embolus	no     HIV Screening:  · In accordance with [place] State law, we offer every patient who comes to our ED an HIV test. Would you like to be tested today?	Opt out    Physical Exam:   Physical Exam: General:  male in NAD, sitting comfortably in bed, conversing appropriately  Neurological: + Right ankle weakness 2/2 spinal surgery. Alert and oriented, facial symmetry present  Cardiovascular: + Irregularly irregular, Clear S1 and S2, no murmurs appreciated  Respiratory: chest expansion symmetrical, CTA b/l, no wheezing noted  Gastrointestinal: +BS, soft, NT, ND  Extremities: moving spontaneously, no calf tenderness or edema. erythematous, scaly rash on upper left thigh, inner groins, right arm   Vascular: warm, well perfused. DP/PT pulses palpable b/l.  Incisions: right radial cath site C/D/I      Assessment and Plan:   · Completed VTE Risk Assessment(s)	Surgical Assessment Completed on: 09-Aug-2023 02:47  · Completed VTE Risk Assessment(s)	Refer to the Assessment tab to view/cancel completed assessment.     Assessment:  · Assessment	  Assessment:   62yo M with past medical history significant for HTN, HLD, new onset AF (on Eliquis) and past spinal surgery for L4-L5 herniation, spinal stenosis (mild deficit on lower left side "ankle weakness"). Initially sent to ED by primary provider for new 's noted during office visit. In ED rate control achieved w/ IV cardizem bolus followed by PO regimen. Admitted for ischemic w/up. NST abnormal. 8/8 echo with LVEF 45-50%, no significant VHD. Cath by Dr. Quevedo w/ evidence of severe triple vessel CAD (hyper-dominant RCA, occluded LAD and occluded OM1). Given study results decision made to transfer patient to our facility under the care of Dr. Quintero for CABG w/up.     Plan:    Neurovascular:   Hx of spinal surgery  - Left ankle weakness at baseline   - Pain well controlled with current regimen.   - PRN's: Tylenol   -Consider gabapentin. patient has taken in the past     Cardiovascular:   MvCAD   - cath today w/ severe triple vessel CAD, hyper-dominant RCA, occluded LAD and occluded OM1  - transfer to [facility] for [facility]G evaluation; preoperative w/up underway (labs, CXR, EKG, PFT's, carotids, UA ordered; had TTE today at [facility])  HTN   - normotensive  - starting lopressor 12.5 BID   HLD   - continue statin therapy   AF, new onset   -Eliquis on hold (last dose 8/7)   - cont heparin gtt; ptt subtherapeutic on arrival   -Hemodynamically stable.   -Monitor: BP, HR, tele    Respiratory:   -Oxygenating well on room air  -Encourage continued use of IS 10x/hr and frequent ambulation  -CXR: pending     GI:  -GI PPX: protonix   -PO Diet    Renal / :  -Continue to monitor renal function: BUN/Cr: pending   -Monitor I/O's daily     Endocrine:    -No hx of DM or thyroid dx  -A1c: pending   -TSH: pending     Hematologic:  -no overt s/s of active bleeding   -CBC: H/H-pending     ID:  -Temperature: afebrile   -CBC: WBC-no leukocytosis   -Continue to observe for SIRS/Sepsis Syndrome.    Prophylaxis:  -DVT prophylaxis with heparin gtt  -Continue with SCD's b/l while patient is at rest     Disposition:  Tx from [facility] for CABG eval in the setting of MvCAD   Preoperative labs/imaging ordered            Electronic Signatures:  Lala Nam)   (Signed 08-Aug-2023 20:03)  	Authored: History and Physical, Allergies/Medications, History of Present Illness, Patient History, Risk Assessment, Physical Exam, Assessment and Plan  LIDA Quintero)   (Signed 09-Aug-2023 05:40)  	Co-Signer: History and Physical, History of Present Illness, Allergies/Medications, Patient History, Physical Exam  Felipe Baltazar)   (Signed 09-Aug-2023 04:33)  	Authored: History and Physical, Patient History, History of Present Illness, Allergies/Medications, Physical Exam, Assessment and Plan    Last Updated: 09-Aug-2023 05:40 by LIDA Quintero)

## 2023-08-10 NOTE — BRIEF OPERATIVE NOTE - NSICDXBRIEFPROCEDURE_GEN_ALL_CORE_FT
PROCEDURES:  CABG, with JOLENE 10-Aug-2023 12:12:08  Chrissy Braga   PROCEDURES:  CABG, with JOLENE 10-Aug-2023 12:12:08 CABGx3, LIMA-LAD, SVG-RCA, SVG-OM, EF 45-50% Chrissy Braga   PROCEDURES:  CABG, with JOLENE 10-Aug-2023 12:12:08 CABGx3, LIMA-LAD, SVG-RCA, SVG-OM, EF 45-50% Chrissy Braga  Isolation, vein, pulmonary 10-Aug-2023 19:05:36 [facility] Chrissy Braga  Exc left atrial appendag 10-Aug-2023 19:05:54  Chrissy Braga V

## 2023-08-11 LAB
ALBUMIN SERPL ELPH-MCNC: 3.6 G/DL — SIGNIFICANT CHANGE UP (ref 3.3–5)
ALBUMIN SERPL ELPH-MCNC: 4 G/DL — SIGNIFICANT CHANGE UP (ref 3.3–5)
ALP SERPL-CCNC: 39 U/L — LOW (ref 40–120)
ALP SERPL-CCNC: 41 U/L — SIGNIFICANT CHANGE UP (ref 40–120)
ALT FLD-CCNC: 18 U/L — SIGNIFICANT CHANGE UP (ref 10–45)
ALT FLD-CCNC: 19 U/L — SIGNIFICANT CHANGE UP (ref 10–45)
ANION GAP SERPL CALC-SCNC: 10 MMOL/L — SIGNIFICANT CHANGE UP (ref 5–17)
ANION GAP SERPL CALC-SCNC: 9 MMOL/L — SIGNIFICANT CHANGE UP (ref 5–17)
APTT BLD: 23.2 SEC — LOW (ref 24.5–35.6)
APTT BLD: 25 SEC — SIGNIFICANT CHANGE UP (ref 24.5–35.6)
AST SERPL-CCNC: 33 U/L — SIGNIFICANT CHANGE UP (ref 10–40)
AST SERPL-CCNC: 34 U/L — SIGNIFICANT CHANGE UP (ref 10–40)
BASOPHILS # BLD AUTO: 0 K/UL — SIGNIFICANT CHANGE UP (ref 0–0.2)
BASOPHILS # BLD AUTO: 0.01 K/UL — SIGNIFICANT CHANGE UP (ref 0–0.2)
BASOPHILS NFR BLD AUTO: 0 % — SIGNIFICANT CHANGE UP (ref 0–2)
BASOPHILS NFR BLD AUTO: 0.1 % — SIGNIFICANT CHANGE UP (ref 0–2)
BILIRUB SERPL-MCNC: 0.6 MG/DL — SIGNIFICANT CHANGE UP (ref 0.2–1.2)
BILIRUB SERPL-MCNC: 0.6 MG/DL — SIGNIFICANT CHANGE UP (ref 0.2–1.2)
BUN SERPL-MCNC: 11 MG/DL — SIGNIFICANT CHANGE UP (ref 7–23)
BUN SERPL-MCNC: 13 MG/DL — SIGNIFICANT CHANGE UP (ref 7–23)
CALCIUM SERPL-MCNC: 8.9 MG/DL — SIGNIFICANT CHANGE UP (ref 8.4–10.5)
CALCIUM SERPL-MCNC: 9.1 MG/DL — SIGNIFICANT CHANGE UP (ref 8.4–10.5)
CHLORIDE SERPL-SCNC: 105 MMOL/L — SIGNIFICANT CHANGE UP (ref 96–108)
CHLORIDE SERPL-SCNC: 107 MMOL/L — SIGNIFICANT CHANGE UP (ref 96–108)
CO2 SERPL-SCNC: 23 MMOL/L — SIGNIFICANT CHANGE UP (ref 22–31)
CO2 SERPL-SCNC: 24 MMOL/L — SIGNIFICANT CHANGE UP (ref 22–31)
CREAT SERPL-MCNC: 0.8 MG/DL — SIGNIFICANT CHANGE UP (ref 0.5–1.3)
CREAT SERPL-MCNC: 0.8 MG/DL — SIGNIFICANT CHANGE UP (ref 0.5–1.3)
EGFR: 99 ML/MIN/1.73M2 — SIGNIFICANT CHANGE UP
EGFR: 99 ML/MIN/1.73M2 — SIGNIFICANT CHANGE UP
EOSINOPHIL # BLD AUTO: 0 K/UL — SIGNIFICANT CHANGE UP (ref 0–0.5)
EOSINOPHIL # BLD AUTO: 0.01 K/UL — SIGNIFICANT CHANGE UP (ref 0–0.5)
EOSINOPHIL NFR BLD AUTO: 0 % — SIGNIFICANT CHANGE UP (ref 0–6)
EOSINOPHIL NFR BLD AUTO: 0.1 % — SIGNIFICANT CHANGE UP (ref 0–6)
GAS PNL BLDA: SIGNIFICANT CHANGE UP
GIANT PLATELETS BLD QL SMEAR: PRESENT — SIGNIFICANT CHANGE UP
GLUCOSE BLDC GLUCOMTR-MCNC: 110 MG/DL — HIGH (ref 70–99)
GLUCOSE BLDC GLUCOMTR-MCNC: 111 MG/DL — HIGH (ref 70–99)
GLUCOSE BLDC GLUCOMTR-MCNC: 115 MG/DL — HIGH (ref 70–99)
GLUCOSE BLDC GLUCOMTR-MCNC: 120 MG/DL — HIGH (ref 70–99)
GLUCOSE BLDC GLUCOMTR-MCNC: 131 MG/DL — HIGH (ref 70–99)
GLUCOSE BLDC GLUCOMTR-MCNC: 131 MG/DL — HIGH (ref 70–99)
GLUCOSE BLDC GLUCOMTR-MCNC: 132 MG/DL — HIGH (ref 70–99)
GLUCOSE BLDC GLUCOMTR-MCNC: 147 MG/DL — HIGH (ref 70–99)
GLUCOSE BLDC GLUCOMTR-MCNC: 150 MG/DL — HIGH (ref 70–99)
GLUCOSE BLDC GLUCOMTR-MCNC: 152 MG/DL — HIGH (ref 70–99)
GLUCOSE BLDC GLUCOMTR-MCNC: 162 MG/DL — HIGH (ref 70–99)
GLUCOSE SERPL-MCNC: 121 MG/DL — HIGH (ref 70–99)
GLUCOSE SERPL-MCNC: 158 MG/DL — HIGH (ref 70–99)
HCT VFR BLD CALC: 29.1 % — LOW (ref 39–50)
HCT VFR BLD CALC: 29.7 % — LOW (ref 39–50)
HGB BLD-MCNC: 10.1 G/DL — LOW (ref 13–17)
HGB BLD-MCNC: 9.5 G/DL — LOW (ref 13–17)
IMM GRANULOCYTES NFR BLD AUTO: 0.4 % — SIGNIFICANT CHANGE UP (ref 0–0.9)
INR BLD: 1.31 — HIGH (ref 0.85–1.18)
INR BLD: 1.4 — HIGH (ref 0.85–1.18)
LACTATE SERPL-SCNC: 1.1 MMOL/L — SIGNIFICANT CHANGE UP (ref 0.5–2)
LACTATE SERPL-SCNC: 1.9 MMOL/L — SIGNIFICANT CHANGE UP (ref 0.5–2)
LACTATE SERPL-SCNC: 2.2 MMOL/L — HIGH (ref 0.5–2)
LYMPHOCYTES # BLD AUTO: 0.24 K/UL — LOW (ref 1–3.3)
LYMPHOCYTES # BLD AUTO: 0.77 K/UL — LOW (ref 1–3.3)
LYMPHOCYTES # BLD AUTO: 1.8 % — LOW (ref 13–44)
LYMPHOCYTES # BLD AUTO: 7.5 % — LOW (ref 13–44)
MAGNESIUM SERPL-MCNC: 2.2 MG/DL — SIGNIFICANT CHANGE UP (ref 1.6–2.6)
MAGNESIUM SERPL-MCNC: 2.3 MG/DL — SIGNIFICANT CHANGE UP (ref 1.6–2.6)
MANUAL SMEAR VERIFICATION: SIGNIFICANT CHANGE UP
MCHC RBC-ENTMCNC: 30.6 PG — SIGNIFICANT CHANGE UP (ref 27–34)
MCHC RBC-ENTMCNC: 31 PG — SIGNIFICANT CHANGE UP (ref 27–34)
MCHC RBC-ENTMCNC: 32.6 GM/DL — SIGNIFICANT CHANGE UP (ref 32–36)
MCHC RBC-ENTMCNC: 34 GM/DL — SIGNIFICANT CHANGE UP (ref 32–36)
MCV RBC AUTO: 91.1 FL — SIGNIFICANT CHANGE UP (ref 80–100)
MCV RBC AUTO: 93.9 FL — SIGNIFICANT CHANGE UP (ref 80–100)
MONOCYTES # BLD AUTO: 0.23 K/UL — SIGNIFICANT CHANGE UP (ref 0–0.9)
MONOCYTES # BLD AUTO: 0.97 K/UL — HIGH (ref 0–0.9)
MONOCYTES NFR BLD AUTO: 1.7 % — LOW (ref 2–14)
MONOCYTES NFR BLD AUTO: 9.5 % — SIGNIFICANT CHANGE UP (ref 2–14)
NEUTROPHILS # BLD AUTO: 12.89 K/UL — HIGH (ref 1.8–7.4)
NEUTROPHILS # BLD AUTO: 8.43 K/UL — HIGH (ref 1.8–7.4)
NEUTROPHILS NFR BLD AUTO: 82.4 % — HIGH (ref 43–77)
NEUTROPHILS NFR BLD AUTO: 94.8 % — HIGH (ref 43–77)
NEUTS BAND # BLD: 1.7 % — SIGNIFICANT CHANGE UP (ref 0–8)
NRBC # BLD: 0 /100 WBCS — SIGNIFICANT CHANGE UP (ref 0–0)
PHOSPHATE SERPL-MCNC: 2.9 MG/DL — SIGNIFICANT CHANGE UP (ref 2.5–4.5)
PHOSPHATE SERPL-MCNC: 4.3 MG/DL — SIGNIFICANT CHANGE UP (ref 2.5–4.5)
PLAT MORPH BLD: NORMAL — SIGNIFICANT CHANGE UP
PLATELET # BLD AUTO: 139 K/UL — LOW (ref 150–400)
PLATELET # BLD AUTO: 161 K/UL — SIGNIFICANT CHANGE UP (ref 150–400)
POTASSIUM SERPL-MCNC: 4 MMOL/L — SIGNIFICANT CHANGE UP (ref 3.5–5.3)
POTASSIUM SERPL-MCNC: 4.2 MMOL/L — SIGNIFICANT CHANGE UP (ref 3.5–5.3)
POTASSIUM SERPL-SCNC: 4 MMOL/L — SIGNIFICANT CHANGE UP (ref 3.5–5.3)
POTASSIUM SERPL-SCNC: 4.2 MMOL/L — SIGNIFICANT CHANGE UP (ref 3.5–5.3)
PROT SERPL-MCNC: 5.4 G/DL — LOW (ref 6–8.3)
PROT SERPL-MCNC: 5.6 G/DL — LOW (ref 6–8.3)
PROTHROM AB SERPL-ACNC: 14.8 SEC — HIGH (ref 9.5–13)
PROTHROM AB SERPL-ACNC: 15.8 SEC — HIGH (ref 9.5–13)
RBC # BLD: 3.1 M/UL — LOW (ref 4.2–5.8)
RBC # BLD: 3.26 M/UL — LOW (ref 4.2–5.8)
RBC # FLD: 12.5 % — SIGNIFICANT CHANGE UP (ref 10.3–14.5)
RBC # FLD: 13.1 % — SIGNIFICANT CHANGE UP (ref 10.3–14.5)
RBC BLD AUTO: NORMAL — SIGNIFICANT CHANGE UP
SODIUM SERPL-SCNC: 138 MMOL/L — SIGNIFICANT CHANGE UP (ref 135–145)
SODIUM SERPL-SCNC: 140 MMOL/L — SIGNIFICANT CHANGE UP (ref 135–145)
WBC # BLD: 10.23 K/UL — SIGNIFICANT CHANGE UP (ref 3.8–10.5)
WBC # BLD: 13.36 K/UL — HIGH (ref 3.8–10.5)
WBC # FLD AUTO: 10.23 K/UL — SIGNIFICANT CHANGE UP (ref 3.8–10.5)
WBC # FLD AUTO: 13.36 K/UL — HIGH (ref 3.8–10.5)

## 2023-08-11 PROCEDURE — 99292 CRITICAL CARE ADDL 30 MIN: CPT

## 2023-08-11 PROCEDURE — 71045 X-RAY EXAM CHEST 1 VIEW: CPT | Mod: 26

## 2023-08-11 PROCEDURE — 99291 CRITICAL CARE FIRST HOUR: CPT

## 2023-08-11 PROCEDURE — 71045 X-RAY EXAM CHEST 1 VIEW: CPT | Mod: 26,77

## 2023-08-11 RX ORDER — INSULIN LISPRO 100/ML
VIAL (ML) SUBCUTANEOUS
Refills: 0 | Status: DISCONTINUED | OUTPATIENT
Start: 2023-08-11 | End: 2023-08-14

## 2023-08-11 RX ORDER — DEXTROSE 50 % IN WATER 50 %
15 SYRINGE (ML) INTRAVENOUS ONCE
Refills: 0 | Status: DISCONTINUED | OUTPATIENT
Start: 2023-08-11 | End: 2023-08-14

## 2023-08-11 RX ORDER — SODIUM CHLORIDE 9 MG/ML
1000 INJECTION, SOLUTION INTRAVENOUS
Refills: 0 | Status: DISCONTINUED | OUTPATIENT
Start: 2023-08-11 | End: 2023-08-14

## 2023-08-11 RX ORDER — INSULIN LISPRO 100/ML
VIAL (ML) SUBCUTANEOUS AT BEDTIME
Refills: 0 | Status: DISCONTINUED | OUTPATIENT
Start: 2023-08-11 | End: 2023-08-14

## 2023-08-11 RX ORDER — FUROSEMIDE 40 MG
20 TABLET ORAL ONCE
Refills: 0 | Status: COMPLETED | OUTPATIENT
Start: 2023-08-11 | End: 2023-08-11

## 2023-08-11 RX ORDER — FUROSEMIDE 40 MG
40 TABLET ORAL ONCE
Refills: 0 | Status: COMPLETED | OUTPATIENT
Start: 2023-08-11 | End: 2023-08-11

## 2023-08-11 RX ORDER — ALBUMIN HUMAN 25 %
250 VIAL (ML) INTRAVENOUS ONCE
Refills: 0 | Status: COMPLETED | OUTPATIENT
Start: 2023-08-11 | End: 2023-08-11

## 2023-08-11 RX ORDER — GLUCAGON INJECTION, SOLUTION 0.5 MG/.1ML
1 INJECTION, SOLUTION SUBCUTANEOUS ONCE
Refills: 0 | Status: DISCONTINUED | OUTPATIENT
Start: 2023-08-11 | End: 2023-08-14

## 2023-08-11 RX ORDER — POTASSIUM CHLORIDE 20 MEQ
20 PACKET (EA) ORAL ONCE
Refills: 0 | Status: COMPLETED | OUTPATIENT
Start: 2023-08-11 | End: 2023-08-11

## 2023-08-11 RX ADMIN — ATORVASTATIN CALCIUM 80 MILLIGRAM(S): 80 TABLET, FILM COATED ORAL at 21:11

## 2023-08-11 RX ADMIN — Medication 100 MILLIGRAM(S): at 21:11

## 2023-08-11 RX ADMIN — CHLORHEXIDINE GLUCONATE 1 APPLICATION(S): 213 SOLUTION TOPICAL at 15:23

## 2023-08-11 RX ADMIN — FENTANYL CITRATE 25 MICROGRAM(S): 50 INJECTION INTRAVENOUS at 11:04

## 2023-08-11 RX ADMIN — Medication 20 MILLIGRAM(S): at 07:26

## 2023-08-11 RX ADMIN — FENTANYL CITRATE 25 MICROGRAM(S): 50 INJECTION INTRAVENOUS at 05:00

## 2023-08-11 RX ADMIN — Medication 81 MILLIGRAM(S): at 12:11

## 2023-08-11 RX ADMIN — FENTANYL CITRATE 25 MICROGRAM(S): 50 INJECTION INTRAVENOUS at 10:34

## 2023-08-11 RX ADMIN — Medication 400 MILLIGRAM(S): at 12:10

## 2023-08-11 RX ADMIN — FENTANYL CITRATE 25 MICROGRAM(S): 50 INJECTION INTRAVENOUS at 04:40

## 2023-08-11 RX ADMIN — FENTANYL CITRATE 25 MICROGRAM(S): 50 INJECTION INTRAVENOUS at 23:34

## 2023-08-11 RX ADMIN — POLYETHYLENE GLYCOL 3350 17 GRAM(S): 17 POWDER, FOR SOLUTION ORAL at 14:30

## 2023-08-11 RX ADMIN — Medication 500 MILLILITER(S): at 21:12

## 2023-08-11 RX ADMIN — MUPIROCIN 1 APPLICATION(S): 20 OINTMENT TOPICAL at 19:55

## 2023-08-11 RX ADMIN — SENNA PLUS 2 TABLET(S): 8.6 TABLET ORAL at 21:12

## 2023-08-11 RX ADMIN — HEPARIN SODIUM 5000 UNIT(S): 5000 INJECTION INTRAVENOUS; SUBCUTANEOUS at 15:23

## 2023-08-11 RX ADMIN — PANTOPRAZOLE SODIUM 40 MILLIGRAM(S): 20 TABLET, DELAYED RELEASE ORAL at 12:11

## 2023-08-11 RX ADMIN — FENTANYL CITRATE 25 MICROGRAM(S): 50 INJECTION INTRAVENOUS at 18:57

## 2023-08-11 RX ADMIN — Medication 100 MILLIGRAM(S): at 06:23

## 2023-08-11 RX ADMIN — Medication 40 MILLIGRAM(S): at 19:55

## 2023-08-11 RX ADMIN — Medication 1000 MILLIGRAM(S): at 12:40

## 2023-08-11 RX ADMIN — FENTANYL CITRATE 25 MICROGRAM(S): 50 INJECTION INTRAVENOUS at 19:27

## 2023-08-11 RX ADMIN — Medication 1000 MILLIGRAM(S): at 22:02

## 2023-08-11 RX ADMIN — Medication 100 MILLIGRAM(S): at 15:23

## 2023-08-11 RX ADMIN — Medication 1 APPLICATION(S): at 18:53

## 2023-08-11 RX ADMIN — Medication 400 MILLIGRAM(S): at 21:11

## 2023-08-11 RX ADMIN — Medication 1000 MILLIGRAM(S): at 07:00

## 2023-08-11 RX ADMIN — Medication 1000 MILLIGRAM(S): at 00:30

## 2023-08-11 RX ADMIN — Medication 100 MILLIEQUIVALENT(S): at 20:14

## 2023-08-11 RX ADMIN — Medication 400 MILLIGRAM(S): at 06:47

## 2023-08-11 RX ADMIN — Medication 500 MILLIGRAM(S): at 18:58

## 2023-08-11 RX ADMIN — Medication 200 GRAM(S): at 01:01

## 2023-08-11 RX ADMIN — HEPARIN SODIUM 5000 UNIT(S): 5000 INJECTION INTRAVENOUS; SUBCUTANEOUS at 21:11

## 2023-08-11 RX ADMIN — MUPIROCIN 1 APPLICATION(S): 20 OINTMENT TOPICAL at 06:47

## 2023-08-11 RX ADMIN — Medication 400 MILLIGRAM(S): at 00:09

## 2023-08-11 NOTE — PHYSICAL THERAPY INITIAL EVALUATION ADULT - ADDITIONAL COMMENTS
Pt states he lives with his wife in private house with 1 FOS. Pt was independent with ADLs and amb PTA.

## 2023-08-11 NOTE — CHART NOTE - NSCHARTNOTEFT_GEN_A_CORE
CT Removal:    Pt seen and examined at bedside.  Case discussed with Dr. Quintero and is recommending removal of CT.  CT clamped for 1 hour with stable repeat CXR. Minimal output from CT.  CT removed without incident.  Occlusive dressing placed. Follow up CXR with small left apical PTX noted.  Pt tolerated procedure well and remained hemodynamically stable.

## 2023-08-11 NOTE — PHYSICAL THERAPY INITIAL EVALUATION ADULT - PERTINENT HX OF CURRENT PROBLEM, REHAB EVAL
62yo M with past medical history significant for HTN, HLD, new onset AF (on Eliquis) and past spinal surgery for L4-L5 herniation, spinal stenosis (mild deficit on lower left side "ankle weakness"). Initially sent to ED by primary provider for new 's noted during office visit. In ED rate control achieved w/ IV cardizem bolus followed by PO regimen. Admitted for ischemic w/up. NST abnormal. 8/8 echo with LVEF 45-50%, no significant VHD. Cath by Dr. Quevedo w/ evidence of severe triple vessel CAD (hyper-dominant RCA, occluded LAD and occluded OM1). Given study results decision made to transfer patient to our facility under the care of Dr. Quintero for CABG. Plan for pt to go to OR tomorrow for CABG. Spoke with dermatologist regarding rash on legs. Consulted via video chat. Feels that it is fungal in nature. Will be treated with an antifungal cream.

## 2023-08-11 NOTE — PROGRESS NOTE ADULT - SUBJECTIVE AND OBJECTIVE BOX
CTICU  CRITICAL  CARE  attending     Hand off received 					   Pertinent clinical, laboratory, radiographic, hemodynamic, echocardiographic, respiratory data, microbiologic data and chart were reviewed and analyzed frequently throughout the course of the day and night        63 year old Male with HTN, HLD, new onset AF (on Eliquis) and past spinal surgery for L4-L5 herniation, spinal stenosis.   Initially sent to ED by primary provider for new 's noted during office visit. In ED rate control achieved with IV Diltiazem bolus followed by PO regimen.   He was admitted for ischemic work up.   Nuclear Stress Teast was positive for ischemia.  Echo: LVEF 45-50%, No significant Valvular Heart Disease.   Cardiac Cath by Dr. Quevedo showed severe triple vessel CAD (hyper-dominant RCA, occluded LAD and occluded OM1).     S/P CABG x 3.       FAMILY HISTORY:  PAST MEDICAL & SURGICAL HISTORY:  CAD (coronary artery disease)  HTN (hypertension)  Atrial fibrillation        14 system review was unremarkable    Vital signs, hemodynamic and respiratory parameters were reviewed from the bedside nursing flow sheet.  ICU Vital Signs Last 24 Hrs  T(C): 36.4 (11 Aug 2023 20:54), Max: 36.4 (11 Aug 2023 08:36)  T(F): 97.6 (11 Aug 2023 20:54), Max: 97.6 (11 Aug 2023 08:36)  HR: 74 (11 Aug 2023 20:51) (67 - 77)  BP: 112/65 (11 Aug 2023 18:00) (106/62 - 121/70)  BP(mean): 83 (11 Aug 2023 18:00) (78 - 89)  ABP: 145/75 (11 Aug 2023 20:00) (93/52 - 150/81)  ABP(mean): 93 (11 Aug 2023 20:00) (67 - 101)  RR: 16 (11 Aug 2023 20:51) (7 - 24)  SpO2: 92% (11 Aug 2023 20:51) (92% - 99%)    O2 Parameters below as of 11 Aug 2023 20:51  Patient On (Oxygen Delivery Method): nasal cannula w/ humidification  O2 Flow (L/min): 6        Adult Advanced Hemodynamics Last 24 Hrs  CVP(mm Hg): 19 (11 Aug 2023 20:00) (7 - 19)  CVP(cm H2O): --  CO: --  CI: --  PA: --  PA(mean): --  PCWP: --  SVR: --  SVRI: --  PVR: --  PVRI: --, ABG - ( 11 Aug 2023 14:50 )  pH, Arterial: 7.44  pH, Blood: x     /  pCO2: 35    /  pO2: 67    / HCO3: 24    / Base Excess: 0.1   /  SaO2: 96.7              Mode: SIMV with PS  RR (machine): 14  TV (machine): 600  FiO2: 50  PEEP: 10  PS: 12  ITime: 1  MAP: 12  PIP: 22    Intake and output was reviewed and the fluid balance was calculated  Daily     Daily   I&O's Summary    10 Aug 2023 07:01  -  11 Aug 2023 07:00  --------------------------------------------------------  IN: 3079.4 mL / OUT: 2110 mL / NET: 969.4 mL    11 Aug 2023 07:01  -  11 Aug 2023 22:06  --------------------------------------------------------  IN: 622.9 mL / OUT: 2115 mL / NET: -1492.1 mL        All lines and drain sites were assessed    Neuro: No change in the mental status from the baseline.   Neck: No JVD.  CVS: S1, S2, No S3.  Lungs: Diminished breath sounds at the left base.  Abd: Soft. No tenderness. + Bowel sounds.  Vascular: + DP/PT.  Extremities: No edema.  Lymphatic: Normal.  Skin: No abnormalities.      labs  CBC Full  -  ( 11 Aug 2023 15:04 )  WBC Count : 10.23 K/uL  RBC Count : 3.10 M/uL  Hemoglobin : 9.5 g/dL  Hematocrit : 29.1 %  Platelet Count - Automated : 139 K/uL  Mean Cell Volume : 93.9 fl  Mean Cell Hemoglobin : 30.6 pg  Mean Cell Hemoglobin Concentration : 32.6 gm/dL  Auto Neutrophil # : 8.43 K/uL  Auto Lymphocyte # : 0.77 K/uL  Auto Monocyte # : 0.97 K/uL  Auto Eosinophil # : 0.01 K/uL  Auto Basophil # : 0.01 K/uL  Auto Neutrophil % : 82.4 %  Auto Lymphocyte % : 7.5 %  Auto Monocyte % : 9.5 %  Auto Eosinophil % : 0.1 %  Auto Basophil % : 0.1 %    08-11    138  |  105  |  13  ----------------------------<  121<H>  4.0   |  24  |  0.80    Ca    9.1      11 Aug 2023 15:04  Phos  4.3     08-11  Mg     2.3     08-11    TPro  5.4<L>  /  Alb  3.6  /  TBili  0.6  /  DBili  x   /  AST  33  /  ALT  18  /  AlkPhos  39<L>  08-11    PT/INR - ( 11 Aug 2023 15:04 )   PT: 15.8 sec;   INR: 1.40          PTT - ( 11 Aug 2023 15:04 )  PTT:23.2 sec  The current medications were reviewed   MEDICATIONS  (STANDING):  ascorbic acid 500 milliGRAM(s) Oral two times a day  aspirin enteric coated 81 milliGRAM(s) Oral daily  atorvastatin 80 milliGRAM(s) Oral at bedtime  ceFAZolin   IVPB 2000 milliGRAM(s) IV Intermittent every 8 hours  chlorhexidine 2% Cloths 1 Application(s) Topical daily  clotrimazole 1% Cream 1 Application(s) Topical two times a day  dextrose 5%. 1000 milliLiter(s) (100 mL/Hr) IV Continuous <Continuous>  dextrose 5%. 1000 milliLiter(s) (50 mL/Hr) IV Continuous <Continuous>  dextrose 50% Injectable 25 milliLiter(s) IV Push every 15 minutes  dextrose 50% Injectable 50 milliLiter(s) IV Push every 15 minutes  glucagon  Injectable 1 milliGRAM(s) IntraMuscular once  heparin   Injectable 5000 Unit(s) SubCutaneous every 8 hours  insulin lispro (ADMELOG) corrective regimen sliding scale   SubCutaneous three times a day before meals  insulin lispro (ADMELOG) corrective regimen sliding scale   SubCutaneous at bedtime  mupirocin 2% Ointment 1 Application(s) Both Nostrils two times a day  pantoprazole  Injectable 40 milliGRAM(s) IV Push daily  polyethylene glycol 3350 17 Gram(s) Oral daily  senna 2 Tablet(s) Oral at bedtime  sodium chloride 0.9%. 1000 milliLiter(s) (10 mL/Hr) IV Continuous <Continuous>    MEDICATIONS  (PRN):  dextrose Oral Gel 15 Gram(s) Oral once PRN Blood Glucose LESS THAN 70 milliGRAM(s)/deciliter  fentaNYL    Injectable 25 MICROGram(s) IV Push every 3 hours PRN Severe Pain (7 - 10)        63 year old  Male admitted with triple vessel CAD   S/P CABG x 3. LIMA to LAD, SVG to OM, SVG to RCA).  S/P Exclusion of LA appendage.  S/P isolation of pulmonary veins.   Hemodynamically stable.  Good oxygenation.  Low out put.        My plan includes :  Gentle Diuresis  Statin and Betablocker.  Close hemodynamic monitoring.  Monitor for arrhythmias and monitor parameters for organ perfusion  Monitor neurologic status  Monitor renal function.  Head of the bed should remain elevated to 45 deg .   Chest PT and IS will be encouraged  Monitor adequacy of oxygenation  Nutritional goals will be met using po eventually , ensure adequate caloric intake and monitor the same  Stress ulcer and VTE prophylaxis will be achieved    Glycemic control is satisfactory  Electrolytes have been repleted as necessary and wound care has been carried out. Pain control has been achieved.   Aggressive physical therapy and early mobility and ambulation goals will be met   The family was updated about the course and plan  CRITICAL CARE TIME SPENT in evaluation and management, review and interpretation of labs and x-rays, hemodynamic management, formulating a plan and coordinating care: ___30____ MIN.  Time does not include procedural time.  CTICU ATTENDING     					    Zurdo Jensen MD

## 2023-08-11 NOTE — PROGRESS NOTE ADULT - SUBJECTIVE AND OBJECTIVE BOX
INTERVAL HPI/OVERNIGHT EVENTS:    POD #1 CABG x 3  EF 40%    62yo Male Hx HTN, HLD, spinal surgery for L4-L5 herniation/spinal stenosis referred to ER by primary provider for new onset atrial fib (130's  - Eliquis)    Cardizem given & admitted for ischemic workup   NSTL  abnormal.  ECHO 8/8: EF 45%, no significant VHD.  Cath (Dr. Quevedo) - severe triple vessel CAD (hyper-dominant RCA, occluded LAD and occluded OM1).     Transferred to [facility] for surgucal evaluation and intervention   noted fungal rash  - extending on left inner thigh to groin and again on upper right extremity - Tx prior to OR    to OR - No intraop blood products - 2.1L LR/800 cc Autologous blood/400 cc Pump blood  arrived to ICU on Epi and Levo     Initial LA 3.2  titrated off levophed overnight and remained on low dose EPi this am 0.01 - off at 09:30a    ICU Vital Signs Last 24 Hrs  T(C): 36.4 (11 Aug 2023 08:36), Max: 37.2 (10 Aug 2023 10:42)  T(F): 97.6 (11 Aug 2023 08:36), Max: 98.2 (10 Aug 2023 10:55)  HR: 72 (11 Aug 2023 10:00) (72 - 94) sinus   BP: 106/62 (11 Aug 2023 10:00) (106/62 - 145/87)  BP(mean): 79 (11 Aug 2023 10:00) (79 - 89)  ABP: 133/77 (11 Aug 2023 10:00) (83/59 - 134/75)  ABP(mean): 92 (11 Aug 2023 10:00) (67 - 101)  RR: 16 (11 Aug 2023 10:04) (11 - 23)  SpO2: 93% (11 Aug 2023 10:04) (92% - 100%) HFNC 40/40    Qtts: None     I&O's Summary    10 Aug 2023 07:01  -  11 Aug 2023 07:00  --------------------------------------------------------  IN: 3079.4 mL / OUT: 2110 mL / NET: 969.4 mL    11 Aug 2023 07:01  -  11 Aug 2023 10:27  --------------------------------------------------------  IN: 58.4 mL / OUT: 790 mL / NET: -731.6 mL        Mode: SIMV with PS  RR (machine): 14  TV (machine): 600  FiO2: 50  PEEP: 10  PS: 12  ITime: 1  MAP: 12  PIP: 22      Physical Exam    Heart  Lungs  Abd  Ext  Chest  Neuro  Skin    LABS:                        10.1   13.36 )-----------( 161      ( 11 Aug 2023 02:47 )             29.7     08-11    140  |  107  |  11  ----------------------------<  158<H>  4.2   |  23  |  0.80    Ca    8.9      11 Aug 2023 02:47  Phos  2.9     08-11  Mg     2.2     08-11    TPro  5.6<L>  /  Alb  4.0  /  TBili  0.6  /  DBili  x   /  AST  34  /  ALT  19  /  AlkPhos  41  08-11    PT/INR - ( 11 Aug 2023 02:47 )   PT: 14.8 sec;   INR: 1.31          PTT - ( 11 Aug 2023 02:47 )  PTT:25.0 sec  Urinalysis Basic - ( 11 Aug 2023 02:47 )    Color: x / Appearance: x / SG: x / pH: x  Gluc: 158 mg/dL / Ketone: x  / Bili: x / Urobili: x   Blood: x / Protein: x / Nitrite: x   Leuk Esterase: x / RBC: x / WBC x   Sq Epi: x / Non Sq Epi: x / Bacteria: x      ABG - ( 11 Aug 2023 07:06 )  pH, Arterial: 7.42  pH, Blood: x     /  pCO2: 32    /  pO2: 71    / HCO3: 21    / Base Excess: -2.8  /  SaO2: 96.6                RADIOLOGY & ADDITIONAL STUDIES:    I have spent/provided stated minutes of critical care time to this patient:  INTERVAL HPI/OVERNIGHT EVENTS:    POD #1 CABG x 3/CryoMAZE/WILLIAMS occlusion  EF 40%    64yo Male Hx HTN, HLD, spinal surgery for L4-L5 herniation/spinal stenosis referred to ER by primary provider for new onset atrial fib (130's  - Eliquis)    Cardizem given & admitted for ischemic workup   NSTL  abnormal.  ECHO 8/8: EF 45%, no significant VHD.  Cath (Dr. Quevedo) - severe triple vessel CAD (hyper-dominant RCA, occluded LAD and occluded OM1).     Transferred to [facility] for surgucal evaluation and intervention   noted fungal rash  - extending on left inner thigh to groin and again on upper right extremity - Tx prior to OR    to OR - No intraop blood products - 2.1L LR/800 cc Autologous blood/400 cc Pump blood  arrived to ICU on Epi and Levo     Initial LA 3.2  titrated off levophed overnight and remained on low dose Epi this am 0.01 - off at 09:30a    diuretic dosing given this am with favorable response  patient OOB in chair     ICU Vital Signs Last 24 Hrs  T(C): 36.4 (11 Aug 2023 08:36), Max: 37.2 (10 Aug 2023 10:42)  T(F): 97.6 (11 Aug 2023 08:36), Max: 98.2 (10 Aug 2023 10:55)  HR: 72 (11 Aug 2023 10:00) (72 - 94) sinus   BP: 106/62 (11 Aug 2023 10:00) (106/62 - 145/87)  BP(mean): 79 (11 Aug 2023 10:00) (79 - 89)  ABP: 133/77 (11 Aug 2023 10:00) (83/59 - 134/75)  ABP(mean): 92 (11 Aug 2023 10:00) (67 - 101)  RR: 16 (11 Aug 2023 10:04) (11 - 23)  SpO2: 93% (11 Aug 2023 10:04) (92% - 100%) HFNC 40/40 - transitioned to NC  Qtts: None     I&O's Summary    10 Aug 2023 07:01  -  11 Aug 2023 07:00  --------------------------------------------------------  IN: 3079.4 mL / OUT: 2110 mL / NET: 969.4 mL    11 Aug 2023 07:01  -  11 Aug 2023 10:27  --------------------------------------------------------  IN: 58.4 mL / OUT: 790 mL / NET: -731.6 mL    Physical Exam    Heart - regular (-)rub/gallop  Lungs - CTA anterior - rhonchi/wheeze  Abd - (+)BS soft NTND (-)r/r/g  Ext - warm to touch   Chest- op bandage in place  Neuro - alert/oriented and interactive - nonfocal   Skin - no rash     LABS:                        10.1   13.36 )-----------( 161      ( 11 Aug 2023 02:47 )             29.7     08-11    140  |  107  |  11  ----------------------------<  158<H>  4.2   |  23  |  0.80    Ca    8.9      11 Aug 2023 02:47  Phos  2.9     08-11  Mg     2.2     08-11    TPro  5.6<L>  /  Alb  4.0  /  TBili  0.6  /  DBili  x   /  AST  34  /  ALT  19  /  AlkPhos  41  08-11    PT/INR - ( 11 Aug 2023 02:47 )   PT: 14.8 sec;   INR: 1.31     PTT - ( 11 Aug 2023 02:47 )  PTT:25.0 sec    ABG - ( 11 Aug 2023 07:06 )  pH, Arterial: 7.42  pH, Blood: x     /  pCO2: 32    /  pO2: 71    / HCO3: 21    / Base Excess: -2.8  /  SaO2: 96.6      RADIOLOGY & ADDITIONAL STUDIES: reviewed     Patient with new onset atrial fib (eliquis) prompting ischemic workup found to have multivessel CAD - now POD#1 CABG    1. CV  post-op cardiogenic shock  titrate down to off Epi anticipated today  sinus rhythm at this time   ASA/statin  anticipate starting low dose BB when off Epi in post-op period when clinical scenario allows   serial labs - close monitor for any signs of hypoperfusion   complete periop Abx prophylaxis  LA     2. Pulm   Extubated to HFNC  transition off HFNC to nasal cannula  incentive spirometry/ambulation with staff assist   diuresis     3. Endocrine  maintain glycemic control   /111; HgA1c 5.9  transition off insulin infusion per protocol     DVT and GI prophylaxis    d/w patient/staff and CTS    I have spent/provided stated minutes of critical care time to this patient: 60

## 2023-08-12 LAB
ALBUMIN SERPL ELPH-MCNC: 3.6 G/DL — SIGNIFICANT CHANGE UP (ref 3.3–5)
ALBUMIN SERPL ELPH-MCNC: 4.1 G/DL — SIGNIFICANT CHANGE UP (ref 3.3–5)
ALP SERPL-CCNC: 41 U/L — SIGNIFICANT CHANGE UP (ref 40–120)
ALP SERPL-CCNC: 47 U/L — SIGNIFICANT CHANGE UP (ref 40–120)
ALT FLD-CCNC: 16 U/L — SIGNIFICANT CHANGE UP (ref 10–45)
ALT FLD-CCNC: 16 U/L — SIGNIFICANT CHANGE UP (ref 10–45)
ANION GAP SERPL CALC-SCNC: 10 MMOL/L — SIGNIFICANT CHANGE UP (ref 5–17)
ANION GAP SERPL CALC-SCNC: 9 MMOL/L — SIGNIFICANT CHANGE UP (ref 5–17)
APTT BLD: 21.3 SEC — LOW (ref 24.5–35.6)
AST SERPL-CCNC: 21 U/L — SIGNIFICANT CHANGE UP (ref 10–40)
AST SERPL-CCNC: 23 U/L — SIGNIFICANT CHANGE UP (ref 10–40)
BASOPHILS # BLD AUTO: 0.01 K/UL — SIGNIFICANT CHANGE UP (ref 0–0.2)
BASOPHILS # BLD AUTO: 0.01 K/UL — SIGNIFICANT CHANGE UP (ref 0–0.2)
BASOPHILS NFR BLD AUTO: 0.1 % — SIGNIFICANT CHANGE UP (ref 0–2)
BASOPHILS NFR BLD AUTO: 0.1 % — SIGNIFICANT CHANGE UP (ref 0–2)
BILIRUB SERPL-MCNC: 0.8 MG/DL — SIGNIFICANT CHANGE UP (ref 0.2–1.2)
BILIRUB SERPL-MCNC: 1 MG/DL — SIGNIFICANT CHANGE UP (ref 0.2–1.2)
BUN SERPL-MCNC: 15 MG/DL — SIGNIFICANT CHANGE UP (ref 7–23)
BUN SERPL-MCNC: 17 MG/DL — SIGNIFICANT CHANGE UP (ref 7–23)
CALCIUM SERPL-MCNC: 8.6 MG/DL — SIGNIFICANT CHANGE UP (ref 8.4–10.5)
CALCIUM SERPL-MCNC: 8.8 MG/DL — SIGNIFICANT CHANGE UP (ref 8.4–10.5)
CHLORIDE SERPL-SCNC: 101 MMOL/L — SIGNIFICANT CHANGE UP (ref 96–108)
CHLORIDE SERPL-SCNC: 98 MMOL/L — SIGNIFICANT CHANGE UP (ref 96–108)
CO2 SERPL-SCNC: 24 MMOL/L — SIGNIFICANT CHANGE UP (ref 22–31)
CO2 SERPL-SCNC: 26 MMOL/L — SIGNIFICANT CHANGE UP (ref 22–31)
CREAT SERPL-MCNC: 0.74 MG/DL — SIGNIFICANT CHANGE UP (ref 0.5–1.3)
CREAT SERPL-MCNC: 0.78 MG/DL — SIGNIFICANT CHANGE UP (ref 0.5–1.3)
EGFR: 100 ML/MIN/1.73M2 — SIGNIFICANT CHANGE UP
EGFR: 102 ML/MIN/1.73M2 — SIGNIFICANT CHANGE UP
EOSINOPHIL # BLD AUTO: 0 K/UL — SIGNIFICANT CHANGE UP (ref 0–0.5)
EOSINOPHIL # BLD AUTO: 0 K/UL — SIGNIFICANT CHANGE UP (ref 0–0.5)
EOSINOPHIL NFR BLD AUTO: 0 % — SIGNIFICANT CHANGE UP (ref 0–6)
EOSINOPHIL NFR BLD AUTO: 0 % — SIGNIFICANT CHANGE UP (ref 0–6)
GAS PNL BLDA: SIGNIFICANT CHANGE UP
GLUCOSE BLDC GLUCOMTR-MCNC: 135 MG/DL — HIGH (ref 70–99)
GLUCOSE BLDC GLUCOMTR-MCNC: 142 MG/DL — HIGH (ref 70–99)
GLUCOSE BLDC GLUCOMTR-MCNC: 155 MG/DL — HIGH (ref 70–99)
GLUCOSE BLDC GLUCOMTR-MCNC: 158 MG/DL — HIGH (ref 70–99)
GLUCOSE SERPL-MCNC: 149 MG/DL — HIGH (ref 70–99)
GLUCOSE SERPL-MCNC: 151 MG/DL — HIGH (ref 70–99)
HCT VFR BLD CALC: 29 % — LOW (ref 39–50)
HCT VFR BLD CALC: 29.7 % — LOW (ref 39–50)
HGB BLD-MCNC: 10 G/DL — LOW (ref 13–17)
HGB BLD-MCNC: 9.5 G/DL — LOW (ref 13–17)
IMM GRANULOCYTES NFR BLD AUTO: 0.3 % — SIGNIFICANT CHANGE UP (ref 0–0.9)
IMM GRANULOCYTES NFR BLD AUTO: 0.5 % — SIGNIFICANT CHANGE UP (ref 0–0.9)
INR BLD: 1.42 — HIGH (ref 0.85–1.18)
LYMPHOCYTES # BLD AUTO: 0.8 K/UL — LOW (ref 1–3.3)
LYMPHOCYTES # BLD AUTO: 0.95 K/UL — LOW (ref 1–3.3)
LYMPHOCYTES # BLD AUTO: 6.3 % — LOW (ref 13–44)
LYMPHOCYTES # BLD AUTO: 8.2 % — LOW (ref 13–44)
MAGNESIUM SERPL-MCNC: 2.2 MG/DL — SIGNIFICANT CHANGE UP (ref 1.6–2.6)
MAGNESIUM SERPL-MCNC: 2.2 MG/DL — SIGNIFICANT CHANGE UP (ref 1.6–2.6)
MCHC RBC-ENTMCNC: 30.8 PG — SIGNIFICANT CHANGE UP (ref 27–34)
MCHC RBC-ENTMCNC: 31.6 PG — SIGNIFICANT CHANGE UP (ref 27–34)
MCHC RBC-ENTMCNC: 32.8 GM/DL — SIGNIFICANT CHANGE UP (ref 32–36)
MCHC RBC-ENTMCNC: 33.7 GM/DL — SIGNIFICANT CHANGE UP (ref 32–36)
MCV RBC AUTO: 94 FL — SIGNIFICANT CHANGE UP (ref 80–100)
MCV RBC AUTO: 94.2 FL — SIGNIFICANT CHANGE UP (ref 80–100)
MONOCYTES # BLD AUTO: 0.85 K/UL — SIGNIFICANT CHANGE UP (ref 0–0.9)
MONOCYTES # BLD AUTO: 1.09 K/UL — HIGH (ref 0–0.9)
MONOCYTES NFR BLD AUTO: 6.7 % — SIGNIFICANT CHANGE UP (ref 2–14)
MONOCYTES NFR BLD AUTO: 9.4 % — SIGNIFICANT CHANGE UP (ref 2–14)
NEUTROPHILS # BLD AUTO: 10.95 K/UL — HIGH (ref 1.8–7.4)
NEUTROPHILS # BLD AUTO: 9.56 K/UL — HIGH (ref 1.8–7.4)
NEUTROPHILS NFR BLD AUTO: 82 % — HIGH (ref 43–77)
NEUTROPHILS NFR BLD AUTO: 86.4 % — HIGH (ref 43–77)
NRBC # BLD: 0 /100 WBCS — SIGNIFICANT CHANGE UP (ref 0–0)
NRBC # BLD: 0 /100 WBCS — SIGNIFICANT CHANGE UP (ref 0–0)
PHOSPHATE SERPL-MCNC: 3 MG/DL — SIGNIFICANT CHANGE UP (ref 2.5–4.5)
PHOSPHATE SERPL-MCNC: 3.6 MG/DL — SIGNIFICANT CHANGE UP (ref 2.5–4.5)
PLATELET # BLD AUTO: 127 K/UL — LOW (ref 150–400)
PLATELET # BLD AUTO: 134 K/UL — LOW (ref 150–400)
POTASSIUM SERPL-MCNC: 3.7 MMOL/L — SIGNIFICANT CHANGE UP (ref 3.5–5.3)
POTASSIUM SERPL-MCNC: 4.1 MMOL/L — SIGNIFICANT CHANGE UP (ref 3.5–5.3)
POTASSIUM SERPL-SCNC: 3.7 MMOL/L — SIGNIFICANT CHANGE UP (ref 3.5–5.3)
POTASSIUM SERPL-SCNC: 4.1 MMOL/L — SIGNIFICANT CHANGE UP (ref 3.5–5.3)
PROT SERPL-MCNC: 5.7 G/DL — LOW (ref 6–8.3)
PROT SERPL-MCNC: 6.1 G/DL — SIGNIFICANT CHANGE UP (ref 6–8.3)
PROTHROM AB SERPL-ACNC: 16 SEC — HIGH (ref 9.5–13)
RBC # BLD: 3.08 M/UL — LOW (ref 4.2–5.8)
RBC # BLD: 3.16 M/UL — LOW (ref 4.2–5.8)
RBC # FLD: 13.2 % — SIGNIFICANT CHANGE UP (ref 10.3–14.5)
RBC # FLD: 13.2 % — SIGNIFICANT CHANGE UP (ref 10.3–14.5)
SODIUM SERPL-SCNC: 133 MMOL/L — LOW (ref 135–145)
SODIUM SERPL-SCNC: 135 MMOL/L — SIGNIFICANT CHANGE UP (ref 135–145)
WBC # BLD: 11.65 K/UL — HIGH (ref 3.8–10.5)
WBC # BLD: 12.67 K/UL — HIGH (ref 3.8–10.5)
WBC # FLD AUTO: 11.65 K/UL — HIGH (ref 3.8–10.5)
WBC # FLD AUTO: 12.67 K/UL — HIGH (ref 3.8–10.5)

## 2023-08-12 PROCEDURE — 99232 SBSQ HOSP IP/OBS MODERATE 35: CPT

## 2023-08-12 PROCEDURE — 71045 X-RAY EXAM CHEST 1 VIEW: CPT | Mod: 26

## 2023-08-12 RX ORDER — AMIODARONE HYDROCHLORIDE 400 MG/1
200 TABLET ORAL DAILY
Refills: 0 | Status: CANCELLED | OUTPATIENT
Start: 2023-08-18 | End: 2023-08-15

## 2023-08-12 RX ORDER — FUROSEMIDE 40 MG
20 TABLET ORAL ONCE
Refills: 0 | Status: COMPLETED | OUTPATIENT
Start: 2023-08-12 | End: 2023-08-12

## 2023-08-12 RX ORDER — POTASSIUM CHLORIDE 20 MEQ
40 PACKET (EA) ORAL ONCE
Refills: 0 | Status: COMPLETED | OUTPATIENT
Start: 2023-08-12 | End: 2023-08-12

## 2023-08-12 RX ORDER — LIDOCAINE 4 G/100G
1 CREAM TOPICAL EVERY 24 HOURS
Refills: 0 | Status: DISCONTINUED | OUTPATIENT
Start: 2023-08-12 | End: 2023-08-15

## 2023-08-12 RX ORDER — METOPROLOL TARTRATE 50 MG
12.5 TABLET ORAL EVERY 12 HOURS
Refills: 0 | Status: DISCONTINUED | OUTPATIENT
Start: 2023-08-12 | End: 2023-08-15

## 2023-08-12 RX ORDER — OXYCODONE HYDROCHLORIDE 5 MG/1
5 TABLET ORAL EVERY 6 HOURS
Refills: 0 | Status: DISCONTINUED | OUTPATIENT
Start: 2023-08-12 | End: 2023-08-15

## 2023-08-12 RX ORDER — OXYCODONE HYDROCHLORIDE 5 MG/1
10 TABLET ORAL EVERY 6 HOURS
Refills: 0 | Status: DISCONTINUED | OUTPATIENT
Start: 2023-08-12 | End: 2023-08-12

## 2023-08-12 RX ORDER — PANTOPRAZOLE SODIUM 20 MG/1
40 TABLET, DELAYED RELEASE ORAL
Refills: 0 | Status: DISCONTINUED | OUTPATIENT
Start: 2023-08-12 | End: 2023-08-15

## 2023-08-12 RX ORDER — FENTANYL CITRATE 50 UG/ML
12.5 INJECTION INTRAVENOUS ONCE
Refills: 0 | Status: DISCONTINUED | OUTPATIENT
Start: 2023-08-12 | End: 2023-08-12

## 2023-08-12 RX ORDER — AMIODARONE HYDROCHLORIDE 400 MG/1
TABLET ORAL
Refills: 0 | Status: DISCONTINUED | OUTPATIENT
Start: 2023-08-12 | End: 2023-08-15

## 2023-08-12 RX ORDER — POTASSIUM CHLORIDE 20 MEQ
20 PACKET (EA) ORAL ONCE
Refills: 0 | Status: COMPLETED | OUTPATIENT
Start: 2023-08-12 | End: 2023-08-12

## 2023-08-12 RX ORDER — FUROSEMIDE 40 MG
40 TABLET ORAL ONCE
Refills: 0 | Status: COMPLETED | OUTPATIENT
Start: 2023-08-12 | End: 2023-08-12

## 2023-08-12 RX ORDER — AMIODARONE HYDROCHLORIDE 400 MG/1
400 TABLET ORAL EVERY 12 HOURS
Refills: 0 | Status: DISCONTINUED | OUTPATIENT
Start: 2023-08-12 | End: 2023-08-15

## 2023-08-12 RX ADMIN — FENTANYL CITRATE 12.5 MICROGRAM(S): 50 INJECTION INTRAVENOUS at 19:07

## 2023-08-12 RX ADMIN — Medication 40 MILLIGRAM(S): at 21:15

## 2023-08-12 RX ADMIN — Medication 2: at 10:43

## 2023-08-12 RX ADMIN — HEPARIN SODIUM 5000 UNIT(S): 5000 INJECTION INTRAVENOUS; SUBCUTANEOUS at 07:06

## 2023-08-12 RX ADMIN — Medication 2: at 07:10

## 2023-08-12 RX ADMIN — SENNA PLUS 2 TABLET(S): 8.6 TABLET ORAL at 21:16

## 2023-08-12 RX ADMIN — FENTANYL CITRATE 25 MICROGRAM(S): 50 INJECTION INTRAVENOUS at 00:16

## 2023-08-12 RX ADMIN — Medication 12.5 MILLIGRAM(S): at 19:35

## 2023-08-12 RX ADMIN — MUPIROCIN 1 APPLICATION(S): 20 OINTMENT TOPICAL at 07:07

## 2023-08-12 RX ADMIN — Medication 20 MILLIGRAM(S): at 09:25

## 2023-08-12 RX ADMIN — HEPARIN SODIUM 5000 UNIT(S): 5000 INJECTION INTRAVENOUS; SUBCUTANEOUS at 14:20

## 2023-08-12 RX ADMIN — OXYCODONE HYDROCHLORIDE 10 MILLIGRAM(S): 5 TABLET ORAL at 11:02

## 2023-08-12 RX ADMIN — ATORVASTATIN CALCIUM 80 MILLIGRAM(S): 80 TABLET, FILM COATED ORAL at 21:16

## 2023-08-12 RX ADMIN — CHLORHEXIDINE GLUCONATE 1 APPLICATION(S): 213 SOLUTION TOPICAL at 11:52

## 2023-08-12 RX ADMIN — Medication 40 MILLIEQUIVALENT(S): at 21:16

## 2023-08-12 RX ADMIN — OXYCODONE HYDROCHLORIDE 10 MILLIGRAM(S): 5 TABLET ORAL at 12:02

## 2023-08-12 RX ADMIN — OXYCODONE HYDROCHLORIDE 10 MILLIGRAM(S): 5 TABLET ORAL at 03:32

## 2023-08-12 RX ADMIN — Medication 1 APPLICATION(S): at 17:03

## 2023-08-12 RX ADMIN — Medication 20 MILLIEQUIVALENT(S): at 07:06

## 2023-08-12 RX ADMIN — Medication 40 MILLIEQUIVALENT(S): at 04:26

## 2023-08-12 RX ADMIN — LIDOCAINE 1 PATCH: 4 CREAM TOPICAL at 19:35

## 2023-08-12 RX ADMIN — PANTOPRAZOLE SODIUM 40 MILLIGRAM(S): 20 TABLET, DELAYED RELEASE ORAL at 11:05

## 2023-08-12 RX ADMIN — Medication 20 MILLIGRAM(S): at 04:28

## 2023-08-12 RX ADMIN — Medication 500 MILLIGRAM(S): at 07:06

## 2023-08-12 RX ADMIN — Medication 500 MILLIGRAM(S): at 17:02

## 2023-08-12 RX ADMIN — Medication 1 APPLICATION(S): at 07:18

## 2023-08-12 RX ADMIN — Medication 100 MILLIGRAM(S): at 07:17

## 2023-08-12 RX ADMIN — HEPARIN SODIUM 5000 UNIT(S): 5000 INJECTION INTRAVENOUS; SUBCUTANEOUS at 21:15

## 2023-08-12 RX ADMIN — MUPIROCIN 1 APPLICATION(S): 20 OINTMENT TOPICAL at 17:02

## 2023-08-12 RX ADMIN — FENTANYL CITRATE 12.5 MICROGRAM(S): 50 INJECTION INTRAVENOUS at 18:42

## 2023-08-12 RX ADMIN — AMIODARONE HYDROCHLORIDE 400 MILLIGRAM(S): 400 TABLET ORAL at 09:24

## 2023-08-12 RX ADMIN — Medication 81 MILLIGRAM(S): at 11:05

## 2023-08-12 RX ADMIN — Medication 20 MILLIEQUIVALENT(S): at 09:24

## 2023-08-12 RX ADMIN — POLYETHYLENE GLYCOL 3350 17 GRAM(S): 17 POWDER, FOR SOLUTION ORAL at 11:05

## 2023-08-12 NOTE — PROGRESS NOTE ADULT - SUBJECTIVE AND OBJECTIVE BOX
CTICU  CRITICAL  CARE  attending     Hand off received 					   Pertinent clinical, laboratory, radiographic, hemodynamic, echocardiographic, respiratory data, microbiologic data and chart were reviewed and analyzed frequently throughout the course of the day and night  Patient seen and examined with CTS/ SH attending at bedside    Pt is a 63y , Male, post op day # 2 s/p CABG x 3V; EF 40%    post op:    hypoxemia requiring NIV with HFNC  Subcutaneous emphysema  Stable small R apical Ptx          , FAMILY HISTORY:  PAST MEDICAL & SURGICAL HISTORY:  CAD (coronary artery disease)      HTN (hypertension)      Atrial fibrillation        Patient is a 63y old  Male who presents with a chief complaint of 3vCAD (11 Aug 2023 22:06)      14 system review limited 2/2 post op morbidity    Vital signs, hemodynamic and respiratory parameters were reviewed from the bedside nursing flowsheet.  ICU Vital Signs Last 24 Hrs  T(C): 36.2 (12 Aug 2023 15:00), Max: 36.6 (12 Aug 2023 05:10)  T(F): 97.2 (12 Aug 2023 15:00), Max: 97.9 (12 Aug 2023 05:10)  HR: 68 (12 Aug 2023 17:00) (66 - 78)  BP: 127/69 (12 Aug 2023 17:00) (104/60 - 145/71)  BP(mean): 91 (12 Aug 2023 17:00) (76 - 102)  ABP: 91/68 (12 Aug 2023 03:00) (87/53 - 150/81)  ABP(mean): 78 (12 Aug 2023 03:00) (65 - 99)  RR: 17 (12 Aug 2023 17:00) (13 - 20)  SpO2: 96% (12 Aug 2023 17:00) (83% - 98%)    O2 Parameters below as of 12 Aug 2023 17:00  Patient On (Oxygen Delivery Method): nasal cannula, high flow  O2 Flow (L/min): 50  O2 Concentration (%): 50      Adult Advanced Hemodynamics Last 24 Hrs  CVP(mm Hg): 6 (12 Aug 2023 17:00) (3 - 26)  CVP(cm H2O): --  CO: --  CI: --  PA: --  PA(mean): --  PCWP: --  SVR: --  SVRI: --  PVR: --  PVRI: --, ABG - ( 12 Aug 2023 02:32 )  pH, Arterial: 7.45  pH, Blood: x     /  pCO2: 35    /  pO2: 74    / HCO3: 24    / Base Excess: 0.7   /  SaO2: 96.4                Intake and output was reviewed and the fluid balance was calculated  Daily     Daily   I&O's Summary    11 Aug 2023 07:01  -  12 Aug 2023 07:00  --------------------------------------------------------  IN: 772.9 mL / OUT: 3335 mL / NET: -2562.1 mL    12 Aug 2023 07:01  -  12 Aug 2023 17:17  --------------------------------------------------------  IN: 100 mL / OUT: 655 mL / NET: -555 mL        All lines and drain sites were assessed  Glycemic trend was reviewedCAPILLARY BLOOD GLUCOSE      POCT Blood Glucose.: 142 mg/dL (12 Aug 2023 16:43)    No acute change in mental status  Auscultation of the chest reveals equal bs  Abdomen is soft  Extremities are warm and well perfused  Wounds appear clean and unremarkable  Antibiotics are periop    labs  CBC Full  -  ( 12 Aug 2023 10:47 )  WBC Count : 12.67 K/uL  RBC Count : 3.16 M/uL  Hemoglobin : 10.0 g/dL  Hematocrit : 29.7 %  Platelet Count - Automated : 134 K/uL  Mean Cell Volume : 94.0 fl  Mean Cell Hemoglobin : 31.6 pg  Mean Cell Hemoglobin Concentration : 33.7 gm/dL  Auto Neutrophil # : 10.95 K/uL  Auto Lymphocyte # : 0.80 K/uL  Auto Monocyte # : 0.85 K/uL  Auto Eosinophil # : 0.00 K/uL  Auto Basophil # : 0.01 K/uL  Auto Neutrophil % : 86.4 %  Auto Lymphocyte % : 6.3 %  Auto Monocyte % : 6.7 %  Auto Eosinophil % : 0.0 %  Auto Basophil % : 0.1 %    08-12    133<L>  |  98  |  17  ----------------------------<  151<H>  4.1   |  26  |  0.74    Ca    8.8      12 Aug 2023 10:47  Phos  3.0     08-12  Mg     2.2     08-12    TPro  6.1  /  Alb  4.1  /  TBili  1.0  /  DBili  x   /  AST  21  /  ALT  16  /  AlkPhos  47  08-12    PT/INR - ( 12 Aug 2023 03:17 )   PT: 16.0 sec;   INR: 1.42          PTT - ( 12 Aug 2023 03:17 )  PTT:21.3 sec  The current medications were reviewed   MEDICATIONS  (STANDING):  aMIOdarone    Tablet 400 milliGRAM(s) Oral every 12 hours  aMIOdarone    Tablet   Oral   ascorbic acid 500 milliGRAM(s) Oral two times a day  aspirin enteric coated 81 milliGRAM(s) Oral daily  atorvastatin 80 milliGRAM(s) Oral at bedtime  bisacodyl Suppository 10 milliGRAM(s) Rectal once  chlorhexidine 2% Cloths 1 Application(s) Topical daily  clotrimazole 1% Cream 1 Application(s) Topical two times a day  dextrose 5%. 1000 milliLiter(s) (100 mL/Hr) IV Continuous <Continuous>  dextrose 5%. 1000 milliLiter(s) (50 mL/Hr) IV Continuous <Continuous>  dextrose 50% Injectable 50 milliLiter(s) IV Push every 15 minutes  dextrose 50% Injectable 25 milliLiter(s) IV Push every 15 minutes  glucagon  Injectable 1 milliGRAM(s) IntraMuscular once  heparin   Injectable 5000 Unit(s) SubCutaneous every 8 hours  insulin lispro (ADMELOG) corrective regimen sliding scale   SubCutaneous three times a day before meals  insulin lispro (ADMELOG) corrective regimen sliding scale   SubCutaneous at bedtime  mupirocin 2% Ointment 1 Application(s) Both Nostrils two times a day  pantoprazole  Injectable 40 milliGRAM(s) IV Push daily  polyethylene glycol 3350 17 Gram(s) Oral daily  senna 2 Tablet(s) Oral at bedtime  sodium chloride 0.9%. 1000 milliLiter(s) (10 mL/Hr) IV Continuous <Continuous>    MEDICATIONS  (PRN):  dextrose Oral Gel 15 Gram(s) Oral once PRN Blood Glucose LESS THAN 70 milliGRAM(s)/deciliter  oxyCODONE    IR 5 milliGRAM(s) Oral every 6 hours PRN Moderate Pain (4 - 6)  oxyCODONE    IR 10 milliGRAM(s) Oral every 6 hours PRN Severe Pain (7 - 10)       PROBLEM LIST/ ASSESSMENT:  HEALTH ISSUES - PROBLEM Dx:      ,   Patient is a 63y old  Male who presents with a chief complaint of 3vCAD (11 Aug 2023 22:06)     s/p CABG x 3V      My plan includes :    Maintain MAP >70  Diurese to maintain negative fluid balance  Continue NIV with HFNC  Titrate Fio2 to maintain Sao2 >05%  Serial ABGs  modified AMiodarone dosing for new onset Afib ( preop)  NOAC after drains d/cd/    close hemodynamic, ventilatory and drain monitoring and management per post op routine    Monitor for arrhythmias and monitor parameters for organ perfusion  monitor neurologic status  Head of the bed should remain elevated to 45 deg .   chest PT and IS will be encouraged  monitor adequacy of oxygenation and ventilation and attempt to wean oxygen  Nutritional goals will be met using po eventually , ensure adequate caloric intake and montior the same  Stress ulcer and VTE prophylaxis will be achieved    Glycemic control is satisfactory  Electrolytes have been repleted as necessary and wound care has been carried out. Pain control has been achieved.   agressive physical therapy and early mobility and ambulation goals will be met   The family was updated about the course and plan  CRITICAL CARE TIME SPENT in evaluation and management, reassessments, review and interpretation of labs and x-rays, ventilator and hemodynamic management, formulating a plan and coordinating care: ___30___ MIN.  Time does not include procedural time.  CTICU ATTENDING     					    Pierce Vinson MD

## 2023-08-13 LAB
ALBUMIN SERPL ELPH-MCNC: 3.7 G/DL — SIGNIFICANT CHANGE UP (ref 3.3–5)
ALP SERPL-CCNC: 60 U/L — SIGNIFICANT CHANGE UP (ref 40–120)
ALT FLD-CCNC: 29 U/L — SIGNIFICANT CHANGE UP (ref 10–45)
ANION GAP SERPL CALC-SCNC: 8 MMOL/L — SIGNIFICANT CHANGE UP (ref 5–17)
APTT BLD: 28.5 SEC — SIGNIFICANT CHANGE UP (ref 24.5–35.6)
AST SERPL-CCNC: 76 U/L — HIGH (ref 10–40)
BASOPHILS # BLD AUTO: 0.02 K/UL — SIGNIFICANT CHANGE UP (ref 0–0.2)
BASOPHILS NFR BLD AUTO: 0.2 % — SIGNIFICANT CHANGE UP (ref 0–2)
BILIRUB SERPL-MCNC: 0.9 MG/DL — SIGNIFICANT CHANGE UP (ref 0.2–1.2)
BUN SERPL-MCNC: 21 MG/DL — SIGNIFICANT CHANGE UP (ref 7–23)
CALCIUM SERPL-MCNC: 8.7 MG/DL — SIGNIFICANT CHANGE UP (ref 8.4–10.5)
CHLORIDE SERPL-SCNC: 99 MMOL/L — SIGNIFICANT CHANGE UP (ref 96–108)
CO2 SERPL-SCNC: 27 MMOL/L — SIGNIFICANT CHANGE UP (ref 22–31)
CREAT SERPL-MCNC: 0.73 MG/DL — SIGNIFICANT CHANGE UP (ref 0.5–1.3)
EGFR: 102 ML/MIN/1.73M2 — SIGNIFICANT CHANGE UP
EOSINOPHIL # BLD AUTO: 0.05 K/UL — SIGNIFICANT CHANGE UP (ref 0–0.5)
EOSINOPHIL NFR BLD AUTO: 0.5 % — SIGNIFICANT CHANGE UP (ref 0–6)
GLUCOSE BLDC GLUCOMTR-MCNC: 118 MG/DL — HIGH (ref 70–99)
GLUCOSE BLDC GLUCOMTR-MCNC: 142 MG/DL — HIGH (ref 70–99)
GLUCOSE BLDC GLUCOMTR-MCNC: 143 MG/DL — HIGH (ref 70–99)
GLUCOSE BLDC GLUCOMTR-MCNC: 172 MG/DL — HIGH (ref 70–99)
GLUCOSE SERPL-MCNC: 142 MG/DL — HIGH (ref 70–99)
HCT VFR BLD CALC: 28.6 % — LOW (ref 39–50)
HGB BLD-MCNC: 9.5 G/DL — LOW (ref 13–17)
IMM GRANULOCYTES NFR BLD AUTO: 0.7 % — SIGNIFICANT CHANGE UP (ref 0–0.9)
INR BLD: 1.25 — HIGH (ref 0.85–1.18)
LYMPHOCYTES # BLD AUTO: 0.8 K/UL — LOW (ref 1–3.3)
LYMPHOCYTES # BLD AUTO: 7.6 % — LOW (ref 13–44)
MAGNESIUM SERPL-MCNC: 2.3 MG/DL — SIGNIFICANT CHANGE UP (ref 1.6–2.6)
MCHC RBC-ENTMCNC: 31.1 PG — SIGNIFICANT CHANGE UP (ref 27–34)
MCHC RBC-ENTMCNC: 33.2 GM/DL — SIGNIFICANT CHANGE UP (ref 32–36)
MCV RBC AUTO: 93.8 FL — SIGNIFICANT CHANGE UP (ref 80–100)
MONOCYTES # BLD AUTO: 0.8 K/UL — SIGNIFICANT CHANGE UP (ref 0–0.9)
MONOCYTES NFR BLD AUTO: 7.6 % — SIGNIFICANT CHANGE UP (ref 2–14)
NEUTROPHILS # BLD AUTO: 8.84 K/UL — HIGH (ref 1.8–7.4)
NEUTROPHILS NFR BLD AUTO: 83.4 % — HIGH (ref 43–77)
NRBC # BLD: 0 /100 WBCS — SIGNIFICANT CHANGE UP (ref 0–0)
PHOSPHATE SERPL-MCNC: 2.6 MG/DL — SIGNIFICANT CHANGE UP (ref 2.5–4.5)
PLATELET # BLD AUTO: 137 K/UL — LOW (ref 150–400)
POTASSIUM SERPL-MCNC: 4.1 MMOL/L — SIGNIFICANT CHANGE UP (ref 3.5–5.3)
POTASSIUM SERPL-SCNC: 4.1 MMOL/L — SIGNIFICANT CHANGE UP (ref 3.5–5.3)
PROT SERPL-MCNC: 5.7 G/DL — LOW (ref 6–8.3)
PROTHROM AB SERPL-ACNC: 14.2 SEC — HIGH (ref 9.5–13)
RBC # BLD: 3.05 M/UL — LOW (ref 4.2–5.8)
RBC # FLD: 13.2 % — SIGNIFICANT CHANGE UP (ref 10.3–14.5)
SODIUM SERPL-SCNC: 134 MMOL/L — LOW (ref 135–145)
WBC # BLD: 10.58 K/UL — HIGH (ref 3.8–10.5)
WBC # FLD AUTO: 10.58 K/UL — HIGH (ref 3.8–10.5)

## 2023-08-13 PROCEDURE — 71045 X-RAY EXAM CHEST 1 VIEW: CPT | Mod: 26,76

## 2023-08-13 PROCEDURE — 99232 SBSQ HOSP IP/OBS MODERATE 35: CPT

## 2023-08-13 RX ORDER — POTASSIUM CHLORIDE 20 MEQ
20 PACKET (EA) ORAL ONCE
Refills: 0 | Status: COMPLETED | OUTPATIENT
Start: 2023-08-13 | End: 2023-08-13

## 2023-08-13 RX ORDER — ACETAMINOPHEN 500 MG
650 TABLET ORAL ONCE
Refills: 0 | Status: COMPLETED | OUTPATIENT
Start: 2023-08-13 | End: 2023-08-13

## 2023-08-13 RX ORDER — ATORVASTATIN CALCIUM 80 MG/1
40 TABLET, FILM COATED ORAL AT BEDTIME
Refills: 0 | Status: DISCONTINUED | OUTPATIENT
Start: 2023-08-13 | End: 2023-08-15

## 2023-08-13 RX ORDER — ACETAMINOPHEN 500 MG
650 TABLET ORAL EVERY 6 HOURS
Refills: 0 | Status: DISCONTINUED | OUTPATIENT
Start: 2023-08-13 | End: 2023-08-15

## 2023-08-13 RX ORDER — FUROSEMIDE 40 MG
20 TABLET ORAL ONCE
Refills: 0 | Status: COMPLETED | OUTPATIENT
Start: 2023-08-13 | End: 2023-08-13

## 2023-08-13 RX ORDER — POTASSIUM CHLORIDE 20 MEQ
40 PACKET (EA) ORAL ONCE
Refills: 0 | Status: COMPLETED | OUTPATIENT
Start: 2023-08-13 | End: 2023-08-13

## 2023-08-13 RX ADMIN — OXYCODONE HYDROCHLORIDE 5 MILLIGRAM(S): 5 TABLET ORAL at 18:33

## 2023-08-13 RX ADMIN — Medication 20 MILLIEQUIVALENT(S): at 14:26

## 2023-08-13 RX ADMIN — Medication 20 MILLIGRAM(S): at 05:12

## 2023-08-13 RX ADMIN — HEPARIN SODIUM 5000 UNIT(S): 5000 INJECTION INTRAVENOUS; SUBCUTANEOUS at 05:14

## 2023-08-13 RX ADMIN — Medication 12.5 MILLIGRAM(S): at 18:34

## 2023-08-13 RX ADMIN — PANTOPRAZOLE SODIUM 40 MILLIGRAM(S): 20 TABLET, DELAYED RELEASE ORAL at 05:14

## 2023-08-13 RX ADMIN — AMIODARONE HYDROCHLORIDE 400 MILLIGRAM(S): 400 TABLET ORAL at 18:33

## 2023-08-13 RX ADMIN — Medication 81 MILLIGRAM(S): at 11:36

## 2023-08-13 RX ADMIN — LIDOCAINE 1 PATCH: 4 CREAM TOPICAL at 18:43

## 2023-08-13 RX ADMIN — MUPIROCIN 1 APPLICATION(S): 20 OINTMENT TOPICAL at 18:34

## 2023-08-13 RX ADMIN — Medication 650 MILLIGRAM(S): at 18:44

## 2023-08-13 RX ADMIN — Medication 2: at 11:36

## 2023-08-13 RX ADMIN — HEPARIN SODIUM 5000 UNIT(S): 5000 INJECTION INTRAVENOUS; SUBCUTANEOUS at 14:26

## 2023-08-13 RX ADMIN — OXYCODONE HYDROCHLORIDE 5 MILLIGRAM(S): 5 TABLET ORAL at 18:44

## 2023-08-13 RX ADMIN — Medication 650 MILLIGRAM(S): at 09:46

## 2023-08-13 RX ADMIN — Medication 1 APPLICATION(S): at 18:35

## 2023-08-13 RX ADMIN — OXYCODONE HYDROCHLORIDE 5 MILLIGRAM(S): 5 TABLET ORAL at 11:36

## 2023-08-13 RX ADMIN — Medication 1 APPLICATION(S): at 05:15

## 2023-08-13 RX ADMIN — Medication 12.5 MILLIGRAM(S): at 05:12

## 2023-08-13 RX ADMIN — Medication 500 MILLIGRAM(S): at 18:34

## 2023-08-13 RX ADMIN — LIDOCAINE 1 PATCH: 4 CREAM TOPICAL at 07:38

## 2023-08-13 RX ADMIN — AMIODARONE HYDROCHLORIDE 400 MILLIGRAM(S): 400 TABLET ORAL at 05:15

## 2023-08-13 RX ADMIN — ATORVASTATIN CALCIUM 40 MILLIGRAM(S): 80 TABLET, FILM COATED ORAL at 21:44

## 2023-08-13 RX ADMIN — HEPARIN SODIUM 5000 UNIT(S): 5000 INJECTION INTRAVENOUS; SUBCUTANEOUS at 21:44

## 2023-08-13 RX ADMIN — Medication 500 MILLIGRAM(S): at 05:14

## 2023-08-13 RX ADMIN — Medication 40 MILLIEQUIVALENT(S): at 05:13

## 2023-08-13 RX ADMIN — OXYCODONE HYDROCHLORIDE 5 MILLIGRAM(S): 5 TABLET ORAL at 05:12

## 2023-08-13 RX ADMIN — Medication 20 MILLIGRAM(S): at 14:26

## 2023-08-13 RX ADMIN — OXYCODONE HYDROCHLORIDE 5 MILLIGRAM(S): 5 TABLET ORAL at 06:15

## 2023-08-13 NOTE — PROGRESS NOTE ADULT - SUBJECTIVE AND OBJECTIVE BOX
CTICU  CRITICAL  CARE  attending     Hand off received 					   Pertinent clinical, laboratory, radiographic, hemodynamic, echocardiographic, respiratory data, microbiologic data and chart were reviewed and analyzed frequently throughout the course of the day and night  Patient seen and examined with CTS/ SH attending at bedside    Pt is a 63y , Male, post op day # 3 s/p CABG x 3V; EF 40%    post op:    hypoxemia requiring NIV with HFNC  Subcutaneous emphysema  Stable small R apical Ptx    today:    Chest tube clamped and d/cd  beta blocker added  diuresed        , FAMILY HISTORY:  PAST MEDICAL & SURGICAL HISTORY:  CAD (coronary artery disease)      HTN (hypertension)      Atrial fibrillation        Patient is a 63y old  Male who presents with a chief complaint of MvCAD (12 Aug 2023 16:23)      14 system review limited 2/2 post op morbidity    Vital signs, hemodynamic and respiratory parameters were reviewed from the bedside nursing flowsheet.  ICU Vital Signs Last 24 Hrs  T(C): 36.2 (13 Aug 2023 14:39), Max: 36.3 (12 Aug 2023 21:00)  T(F): 97.2 (13 Aug 2023 14:39), Max: 97.4 (12 Aug 2023 21:00)  HR: 66 (13 Aug 2023 19:00) (60 - 68)  BP: 136/60 (13 Aug 2023 19:00) (106/58 - 141/72)  BP(mean): 87 (13 Aug 2023 19:00) (75 - 101)  ABP: --  ABP(mean): --  RR: 18 (13 Aug 2023 19:00) (14 - 20)  SpO2: 93% (13 Aug 2023 19:00) (92% - 97%)    O2 Parameters below as of 13 Aug 2023 19:00  Patient On (Oxygen Delivery Method): room air          Adult Advanced Hemodynamics Last 24 Hrs  CVP(mm Hg): 6 (13 Aug 2023 06:32) (5 - 9)  CVP(cm H2O): --  CO: --  CI: --  PA: --  PA(mean): --  PCWP: --  SVR: --  SVRI: --  PVR: --  PVRI: --, ABG - ( 12 Aug 2023 02:32 )  pH, Arterial: 7.45  pH, Blood: x     /  pCO2: 35    /  pO2: 74    / HCO3: 24    / Base Excess: 0.7   /  SaO2: 96.4                Intake and output was reviewed and the fluid balance was calculated  Daily     Daily   I&O's Summary    12 Aug 2023 07:01  -  13 Aug 2023 07:00  --------------------------------------------------------  IN: 300 mL / OUT: 1935 mL / NET: -1635 mL    13 Aug 2023 07:01  -  13 Aug 2023 19:34  --------------------------------------------------------  IN: 460 mL / OUT: 625 mL / NET: -165 mL        All lines and drain sites were assessed  Glycemic trend was reviewedCAPILLARY BLOOD GLUCOSE      POCT Blood Glucose.: 118 mg/dL (13 Aug 2023 16:28)    No acute change in mental status  Auscultation of the chest reveals equal bs  Abdomen is soft  Extremities are warm and well perfused  Wounds appear clean and unremarkable  Antibiotics are periop    labs  CBC Full  -  ( 13 Aug 2023 03:49 )  WBC Count : 10.58 K/uL  RBC Count : 3.05 M/uL  Hemoglobin : 9.5 g/dL  Hematocrit : 28.6 %  Platelet Count - Automated : 137 K/uL  Mean Cell Volume : 93.8 fl  Mean Cell Hemoglobin : 31.1 pg  Mean Cell Hemoglobin Concentration : 33.2 gm/dL  Auto Neutrophil # : 8.84 K/uL  Auto Lymphocyte # : 0.80 K/uL  Auto Monocyte # : 0.80 K/uL  Auto Eosinophil # : 0.05 K/uL  Auto Basophil # : 0.02 K/uL  Auto Neutrophil % : 83.4 %  Auto Lymphocyte % : 7.6 %  Auto Monocyte % : 7.6 %  Auto Eosinophil % : 0.5 %  Auto Basophil % : 0.2 %    08-13    134<L>  |  99  |  21  ----------------------------<  142<H>  4.1   |  27  |  0.73    Ca    8.7      13 Aug 2023 03:49  Phos  2.6     08-13  Mg     2.3     08-13    TPro  5.7<L>  /  Alb  3.7  /  TBili  0.9  /  DBili  x   /  AST  76<H>  /  ALT  29  /  AlkPhos  60  08-13    PT/INR - ( 13 Aug 2023 03:49 )   PT: 14.2 sec;   INR: 1.25          PTT - ( 13 Aug 2023 03:49 )  PTT:28.5 sec  The current medications were reviewed   MEDICATIONS  (STANDING):  aMIOdarone    Tablet 400 milliGRAM(s) Oral every 12 hours  aMIOdarone    Tablet   Oral   ascorbic acid 500 milliGRAM(s) Oral two times a day  aspirin enteric coated 81 milliGRAM(s) Oral daily  bisacodyl Suppository 10 milliGRAM(s) Rectal once  chlorhexidine 2% Cloths 1 Application(s) Topical daily  clotrimazole 1% Cream 1 Application(s) Topical two times a day  dextrose 5%. 1000 milliLiter(s) (100 mL/Hr) IV Continuous <Continuous>  dextrose 5%. 1000 milliLiter(s) (50 mL/Hr) IV Continuous <Continuous>  dextrose 50% Injectable 50 milliLiter(s) IV Push every 15 minutes  dextrose 50% Injectable 25 milliLiter(s) IV Push every 15 minutes  glucagon  Injectable 1 milliGRAM(s) IntraMuscular once  heparin   Injectable 5000 Unit(s) SubCutaneous every 8 hours  insulin lispro (ADMELOG) corrective regimen sliding scale   SubCutaneous three times a day before meals  insulin lispro (ADMELOG) corrective regimen sliding scale   SubCutaneous at bedtime  lidocaine   4% Patch 1 Patch Transdermal every 24 hours  metoprolol tartrate 12.5 milliGRAM(s) Oral every 12 hours  mupirocin 2% Ointment 1 Application(s) Both Nostrils two times a day  pantoprazole    Tablet 40 milliGRAM(s) Oral before breakfast  polyethylene glycol 3350 17 Gram(s) Oral daily  senna 2 Tablet(s) Oral at bedtime  sodium chloride 0.9%. 1000 milliLiter(s) (10 mL/Hr) IV Continuous <Continuous>    MEDICATIONS  (PRN):  acetaminophen     Tablet .. 650 milliGRAM(s) Oral every 6 hours PRN Mild Pain (1 - 3)  dextrose Oral Gel 15 Gram(s) Oral once PRN Blood Glucose LESS THAN 70 milliGRAM(s)/deciliter  oxyCODONE    IR 5 milliGRAM(s) Oral every 6 hours PRN Moderate Pain (4 - 6)       PROBLEM LIST/ ASSESSMENT:  HEALTH ISSUES - PROBLEM Dx:      ,   Patient is a 63y old  Male who presents with a chief complaint of MvCAD (12 Aug 2023 16:23)     s/p CABG x 3V      My plan includes :    Diurese to maintain negative fluid balance  Continue supplemental O2 as needed  Titrate Fio2 to maintain Sao2 >95%  Serial ABGs  continue modified AMiodarone dosing for new onset Afib ( preop)  NOAC after drains d/cd/    close hemodynamic, ventilatory and drain monitoring and management per post op routine    Monitor for arrhythmias and monitor parameters for organ perfusion  monitor neurologic status  Head of the bed should remain elevated to 45 deg .   chest PT and IS will be encouraged  monitor adequacy of oxygenation and ventilation and attempt to wean oxygen  Nutritional goals will be met using po eventually , ensure adequate caloric intake and montior the same  Stress ulcer and VTE prophylaxis will be achieved    Glycemic control is satisfactory  Electrolytes have been repleted as necessary and wound care has been carried out. Pain control has been achieved.   agressive physical therapy and early mobility and ambulation goals will be met   The family was updated about the course and plan  CRITICAL CARE TIME SPENT in evaluation and management, reassessments, review and interpretation of labs and x-rays, ventilator and hemodynamic management, formulating a plan and coordinating care: ___30___ MIN.  Time does not include procedural time.  CTICU ATTENDING     					    Pierce Vinson MD

## 2023-08-14 LAB
ALBUMIN SERPL ELPH-MCNC: 3.3 G/DL — SIGNIFICANT CHANGE UP (ref 3.3–5)
ALP SERPL-CCNC: 70 U/L — SIGNIFICANT CHANGE UP (ref 40–120)
ALT FLD-CCNC: 37 U/L — SIGNIFICANT CHANGE UP (ref 10–45)
ANION GAP SERPL CALC-SCNC: 10 MMOL/L — SIGNIFICANT CHANGE UP (ref 5–17)
APTT BLD: 28.7 SEC — SIGNIFICANT CHANGE UP (ref 24.5–35.6)
AST SERPL-CCNC: 62 U/L — HIGH (ref 10–40)
BASOPHILS # BLD AUTO: 0.02 K/UL — SIGNIFICANT CHANGE UP (ref 0–0.2)
BASOPHILS NFR BLD AUTO: 0.2 % — SIGNIFICANT CHANGE UP (ref 0–2)
BILIRUB SERPL-MCNC: 0.8 MG/DL — SIGNIFICANT CHANGE UP (ref 0.2–1.2)
BUN SERPL-MCNC: 23 MG/DL — SIGNIFICANT CHANGE UP (ref 7–23)
CALCIUM SERPL-MCNC: 8.8 MG/DL — SIGNIFICANT CHANGE UP (ref 8.4–10.5)
CHLORIDE SERPL-SCNC: 97 MMOL/L — SIGNIFICANT CHANGE UP (ref 96–108)
CO2 SERPL-SCNC: 26 MMOL/L — SIGNIFICANT CHANGE UP (ref 22–31)
CREAT SERPL-MCNC: 0.75 MG/DL — SIGNIFICANT CHANGE UP (ref 0.5–1.3)
EGFR: 101 ML/MIN/1.73M2 — SIGNIFICANT CHANGE UP
EOSINOPHIL # BLD AUTO: 0.08 K/UL — SIGNIFICANT CHANGE UP (ref 0–0.5)
EOSINOPHIL NFR BLD AUTO: 0.7 % — SIGNIFICANT CHANGE UP (ref 0–6)
GLUCOSE BLDC GLUCOMTR-MCNC: 146 MG/DL — HIGH (ref 70–99)
GLUCOSE BLDC GLUCOMTR-MCNC: 150 MG/DL — HIGH (ref 70–99)
GLUCOSE SERPL-MCNC: 127 MG/DL — HIGH (ref 70–99)
HCT VFR BLD CALC: 32.2 % — LOW (ref 39–50)
HGB BLD-MCNC: 10.4 G/DL — LOW (ref 13–17)
IMM GRANULOCYTES NFR BLD AUTO: 0.5 % — SIGNIFICANT CHANGE UP (ref 0–0.9)
INR BLD: 1.14 — SIGNIFICANT CHANGE UP (ref 0.85–1.18)
LYMPHOCYTES # BLD AUTO: 1.03 K/UL — SIGNIFICANT CHANGE UP (ref 1–3.3)
LYMPHOCYTES # BLD AUTO: 9.6 % — LOW (ref 13–44)
MAGNESIUM SERPL-MCNC: 2.5 MG/DL — SIGNIFICANT CHANGE UP (ref 1.6–2.6)
MCHC RBC-ENTMCNC: 30.1 PG — SIGNIFICANT CHANGE UP (ref 27–34)
MCHC RBC-ENTMCNC: 32.3 GM/DL — SIGNIFICANT CHANGE UP (ref 32–36)
MCV RBC AUTO: 93.1 FL — SIGNIFICANT CHANGE UP (ref 80–100)
MONOCYTES # BLD AUTO: 0.91 K/UL — HIGH (ref 0–0.9)
MONOCYTES NFR BLD AUTO: 8.5 % — SIGNIFICANT CHANGE UP (ref 2–14)
NEUTROPHILS # BLD AUTO: 8.59 K/UL — HIGH (ref 1.8–7.4)
NEUTROPHILS NFR BLD AUTO: 80.5 % — HIGH (ref 43–77)
NRBC # BLD: 0 /100 WBCS — SIGNIFICANT CHANGE UP (ref 0–0)
PHOSPHATE SERPL-MCNC: 2.8 MG/DL — SIGNIFICANT CHANGE UP (ref 2.5–4.5)
PLATELET # BLD AUTO: 170 K/UL — SIGNIFICANT CHANGE UP (ref 150–400)
POTASSIUM SERPL-MCNC: 4.6 MMOL/L — SIGNIFICANT CHANGE UP (ref 3.5–5.3)
POTASSIUM SERPL-SCNC: 4.6 MMOL/L — SIGNIFICANT CHANGE UP (ref 3.5–5.3)
PROT SERPL-MCNC: 6.1 G/DL — SIGNIFICANT CHANGE UP (ref 6–8.3)
PROTHROM AB SERPL-ACNC: 13 SEC — SIGNIFICANT CHANGE UP (ref 9.5–13)
RBC # BLD: 3.46 M/UL — LOW (ref 4.2–5.8)
RBC # FLD: 12.8 % — SIGNIFICANT CHANGE UP (ref 10.3–14.5)
SODIUM SERPL-SCNC: 133 MMOL/L — LOW (ref 135–145)
WBC # BLD: 10.68 K/UL — HIGH (ref 3.8–10.5)
WBC # FLD AUTO: 10.68 K/UL — HIGH (ref 3.8–10.5)

## 2023-08-14 PROCEDURE — 71046 X-RAY EXAM CHEST 2 VIEWS: CPT | Mod: 26

## 2023-08-14 PROCEDURE — 71045 X-RAY EXAM CHEST 1 VIEW: CPT | Mod: 26,59

## 2023-08-14 PROCEDURE — 99232 SBSQ HOSP IP/OBS MODERATE 35: CPT

## 2023-08-14 RX ORDER — TAMSULOSIN HYDROCHLORIDE 0.4 MG/1
0.4 CAPSULE ORAL AT BEDTIME
Refills: 0 | Status: DISCONTINUED | OUTPATIENT
Start: 2023-08-14 | End: 2023-08-14

## 2023-08-14 RX ORDER — FUROSEMIDE 40 MG
20 TABLET ORAL DAILY
Refills: 0 | Status: DISCONTINUED | OUTPATIENT
Start: 2023-08-14 | End: 2023-08-15

## 2023-08-14 RX ORDER — FUROSEMIDE 40 MG
20 TABLET ORAL ONCE
Refills: 0 | Status: COMPLETED | OUTPATIENT
Start: 2023-08-14 | End: 2023-08-14

## 2023-08-14 RX ORDER — GABAPENTIN 400 MG/1
100 CAPSULE ORAL
Refills: 0 | Status: DISCONTINUED | OUTPATIENT
Start: 2023-08-14 | End: 2023-08-15

## 2023-08-14 RX ORDER — SODIUM CHLORIDE 9 MG/ML
3 INJECTION INTRAMUSCULAR; INTRAVENOUS; SUBCUTANEOUS EVERY 8 HOURS
Refills: 0 | Status: DISCONTINUED | OUTPATIENT
Start: 2023-08-14 | End: 2023-08-15

## 2023-08-14 RX ADMIN — HEPARIN SODIUM 5000 UNIT(S): 5000 INJECTION INTRAVENOUS; SUBCUTANEOUS at 13:41

## 2023-08-14 RX ADMIN — HEPARIN SODIUM 5000 UNIT(S): 5000 INJECTION INTRAVENOUS; SUBCUTANEOUS at 06:21

## 2023-08-14 RX ADMIN — GABAPENTIN 100 MILLIGRAM(S): 400 CAPSULE ORAL at 17:40

## 2023-08-14 RX ADMIN — HEPARIN SODIUM 5000 UNIT(S): 5000 INJECTION INTRAVENOUS; SUBCUTANEOUS at 22:30

## 2023-08-14 RX ADMIN — OXYCODONE HYDROCHLORIDE 5 MILLIGRAM(S): 5 TABLET ORAL at 07:22

## 2023-08-14 RX ADMIN — OXYCODONE HYDROCHLORIDE 5 MILLIGRAM(S): 5 TABLET ORAL at 23:26

## 2023-08-14 RX ADMIN — SODIUM CHLORIDE 3 MILLILITER(S): 9 INJECTION INTRAMUSCULAR; INTRAVENOUS; SUBCUTANEOUS at 22:15

## 2023-08-14 RX ADMIN — Medication 12.5 MILLIGRAM(S): at 06:21

## 2023-08-14 RX ADMIN — ATORVASTATIN CALCIUM 40 MILLIGRAM(S): 80 TABLET, FILM COATED ORAL at 22:26

## 2023-08-14 RX ADMIN — OXYCODONE HYDROCHLORIDE 5 MILLIGRAM(S): 5 TABLET ORAL at 22:26

## 2023-08-14 RX ADMIN — Medication 500 MILLIGRAM(S): at 07:26

## 2023-08-14 RX ADMIN — LIDOCAINE 1 PATCH: 4 CREAM TOPICAL at 18:47

## 2023-08-14 RX ADMIN — AMIODARONE HYDROCHLORIDE 400 MILLIGRAM(S): 400 TABLET ORAL at 17:40

## 2023-08-14 RX ADMIN — MUPIROCIN 1 APPLICATION(S): 20 OINTMENT TOPICAL at 22:45

## 2023-08-14 RX ADMIN — CHLORHEXIDINE GLUCONATE 1 APPLICATION(S): 213 SOLUTION TOPICAL at 06:38

## 2023-08-14 RX ADMIN — Medication 1 APPLICATION(S): at 23:12

## 2023-08-14 RX ADMIN — OXYCODONE HYDROCHLORIDE 5 MILLIGRAM(S): 5 TABLET ORAL at 06:23

## 2023-08-14 RX ADMIN — SODIUM CHLORIDE 3 MILLILITER(S): 9 INJECTION INTRAMUSCULAR; INTRAVENOUS; SUBCUTANEOUS at 16:15

## 2023-08-14 RX ADMIN — Medication 20 MILLIGRAM(S): at 06:22

## 2023-08-14 RX ADMIN — LIDOCAINE 1 PATCH: 4 CREAM TOPICAL at 06:38

## 2023-08-14 RX ADMIN — Medication 81 MILLIGRAM(S): at 11:20

## 2023-08-14 RX ADMIN — Medication 12.5 MILLIGRAM(S): at 17:40

## 2023-08-14 RX ADMIN — AMIODARONE HYDROCHLORIDE 400 MILLIGRAM(S): 400 TABLET ORAL at 06:20

## 2023-08-14 RX ADMIN — Medication 20 MILLIGRAM(S): at 18:03

## 2023-08-14 RX ADMIN — PANTOPRAZOLE SODIUM 40 MILLIGRAM(S): 20 TABLET, DELAYED RELEASE ORAL at 06:21

## 2023-08-14 RX ADMIN — MUPIROCIN 1 APPLICATION(S): 20 OINTMENT TOPICAL at 06:21

## 2023-08-14 RX ADMIN — Medication 1 APPLICATION(S): at 06:21

## 2023-08-14 RX ADMIN — Medication 650 MILLIGRAM(S): at 18:03

## 2023-08-14 RX ADMIN — Medication 500 MILLIGRAM(S): at 17:40

## 2023-08-14 NOTE — PROGRESS NOTE ADULT - ASSESSMENT
A/P: 63 year old male with PMHx of HTN, HLD, Atrial fibrillation (on eliquis), spinal stenosis and L4-L5 herniation s/p spinal surgery initially presented to the ED for new onset Atrial fibrillation and was admitted for ischemic workup revealing 3V CAD on cardiac cath. Post cath he was transferred to [facility] under the care of  Dr. Quintero for surgical evaluation on 8/9. Preoperative workup was completed and patient underwent CABGx3, WILLIAMS exclusion and Pulmonary vein isolation with Dr. Quintero on 8/10. Procedure was uncomplicated and he was transferred to CT ICU post operatively intubated and stable. He was extubated POD#0. On POD#1 his substernal  chest tube was removed following clamp trial, post removal patient developed subcutaneous emphysema which improved with his pleural chest tube placed on suction. His chest tube was subsequently removed on POD#3 without any complications. He continued to remain stable and transferred to  on POD#4.     Neurovascular: Stable. Pain well controlled with current regimen.  - Continue tylenol and oxycodone PRN   - Gabapentin 100mg BID started for cardiac ERP     Cardiovascular: Hemodynamically stable. HR controlled.  - CAD s/p CABG. Continue asa 81mg, atorvastatin 40mg qhs, metoprolol 12.5mg BID   - Cardiac ERP Protocol - Continue vitamin C 500mg BID for afib ppx   - Atrial fibrillation - continue modified amiodarone load 400mg BID x 12 doses. Plan for MCOT on discharge   - Plan for epicardial pacing wire removal tomorrow followed by NOAC initiation     Respiratory: 02 Sat = 95% on RA.  - CXR pa/lat today, no obvious effusion + atelectasis L side   - Encourage C+DB and Use of IS 10x / hr while awake.    GI: Stable.  - Continue PO Diet   - Continue protonix 40mg for GI ppx   - Continue bowel regimen     Renal / : BUN/Cr 23/0.75   - Received 20mg IV lasix this AM, start PO lasix for tomorrow   - Consider supplemental K if K < 4. Currently 4.6   - Restart home med flomax 0.4mg   - Monitor renal function.  - Monitor I/O's.    Endocrine:  Hgba1c and TSH wnl   - No active issues     ID:  - LE rash, continue antifungal cream recommended by Derm consult     Prophylaxis:  - DVT prophylaxis with 5000 SubQ Heparin q8h.  - SCD's    Disposition: Home when medically ready, likely tomorrow  A/P: 63 year old male with PMHx of HTN, HLD, Atrial fibrillation (on eliquis), spinal stenosis and L4-L5 herniation s/p spinal surgery initially presented to the ED for new onset Atrial fibrillation and was admitted for ischemic workup revealing 3V CAD on cardiac cath. Post cath he was transferred to [facility] under the care of  Dr. Quintero for surgical evaluation on 8/9. Preoperative workup was completed and patient underwent CABGx3, WILLIAMS exclusion and Pulmonary vein isolation with Dr. Quintero on 8/10. Procedure was uncomplicated and he was transferred to CT ICU post operatively intubated and stable. He was extubated POD#0. On POD#1 his substernal  chest tube was removed following clamp trial, post removal patient developed subcutaneous emphysema which improved with his pleural chest tube placed on suction. His chest tube was subsequently removed on POD#3 without any complications. He continued to remain stable and transferred to  on POD#4.     Neurovascular: Stable. Pain well controlled with current regimen.  - Continue tylenol and oxycodone PRN   - Gabapentin 100mg BID started for cardiac ERP     Cardiovascular: Hemodynamically stable. HR controlled.  - CAD s/p CABG. Continue asa 81mg, atorvastatin 40mg qhs, metoprolol 12.5mg BID   - Cardiac ERP Protocol - Continue vitamin C 500mg BID for afib ppx   - Atrial fibrillation - continue modified amiodarone load 400mg BID x 12 doses. Plan for MCOT on discharge   - Plan for epicardial pacing wire removal tomorrow followed by NOAC initiation     Respiratory: 02 Sat = 95% on RA.  - CXR pa/lat today, no obvious effusion + atelectasis L side   - Encourage C+DB and Use of IS 10x / hr while awake.    GI: Stable.  - Continue PO Diet   - Continue protonix 40mg for GI ppx   - Continue bowel regimen     Renal / : BUN/Cr 23/0.75   - Received 20mg IV lasix this AM, start PO lasix for tomorrow   - Consider supplemental K if K < 4. Currently 4.6   - Monitor renal function.  - Monitor I/O's.    Endocrine:  Hgba1c and TSH wnl   - No active issues     ID:  - LE rash, continue antifungal cream recommended by Derm consult     Prophylaxis:  - DVT prophylaxis with 5000 SubQ Heparin q8h.  - SCD's    Disposition: Home when medically ready, likely tomorrow

## 2023-08-14 NOTE — DIETITIAN INITIAL EVALUATION ADULT - LAB (SPECIFY)
monitor BMP, CBC, glucose, lytes, trend renal indices, LFTs, POCT in post op setting (suggest BG/POCT goal s/p OR/CAB-140)

## 2023-08-14 NOTE — DIETITIAN INITIAL EVALUATION ADULT - OTHER CALCULATIONS
IBW used to calculate energy needs due to pt's current body weight exceeding 120% of IBW  Adjust for age/bmi, s/p OR; EF; Fluids per team

## 2023-08-14 NOTE — DIETITIAN INITIAL EVALUATION ADULT - PERTINENT MEDS FT
MEDICATIONS  (STANDING):  aMIOdarone    Tablet 400 milliGRAM(s) Oral every 12 hours  aMIOdarone    Tablet   Oral   ascorbic acid 500 milliGRAM(s) Oral two times a day  aspirin enteric coated 81 milliGRAM(s) Oral daily  atorvastatin 40 milliGRAM(s) Oral at bedtime  bisacodyl Suppository 10 milliGRAM(s) Rectal once  chlorhexidine 2% Cloths 1 Application(s) Topical daily  clotrimazole 1% Cream 1 Application(s) Topical two times a day  dextrose 5%. 1000 milliLiter(s) (100 mL/Hr) IV Continuous <Continuous>  dextrose 5%. 1000 milliLiter(s) (50 mL/Hr) IV Continuous <Continuous>  dextrose 50% Injectable 25 milliLiter(s) IV Push every 15 minutes  dextrose 50% Injectable 50 milliLiter(s) IV Push every 15 minutes  glucagon  Injectable 1 milliGRAM(s) IntraMuscular once  heparin   Injectable 5000 Unit(s) SubCutaneous every 8 hours  insulin lispro (ADMELOG) corrective regimen sliding scale   SubCutaneous three times a day before meals  insulin lispro (ADMELOG) corrective regimen sliding scale   SubCutaneous at bedtime  lidocaine   4% Patch 1 Patch Transdermal every 24 hours  metoprolol tartrate 12.5 milliGRAM(s) Oral every 12 hours  mupirocin 2% Ointment 1 Application(s) Both Nostrils two times a day  pantoprazole    Tablet 40 milliGRAM(s) Oral before breakfast  polyethylene glycol 3350 17 Gram(s) Oral daily  senna 2 Tablet(s) Oral at bedtime  sodium chloride 0.9% lock flush 3 milliLiter(s) IV Push every 8 hours  sodium chloride 0.9%. 1000 milliLiter(s) (10 mL/Hr) IV Continuous <Continuous>    MEDICATIONS  (PRN):  acetaminophen     Tablet .. 650 milliGRAM(s) Oral every 6 hours PRN Mild Pain (1 - 3)  dextrose Oral Gel 15 Gram(s) Oral once PRN Blood Glucose LESS THAN 70 milliGRAM(s)/deciliter  oxyCODONE    IR 5 milliGRAM(s) Oral every 6 hours PRN Moderate Pain (4 - 6)

## 2023-08-14 NOTE — PROGRESS NOTE ADULT - PROVIDER SPECIALTY LIST ADULT
Critical Care
CT Surgery
Critical Care
CT Surgery

## 2023-08-14 NOTE — DIETITIAN INITIAL EVALUATION ADULT - PERTINENT LABORATORY DATA
08-14    133<L>  |  97  |  23  ----------------------------<  127<H>  4.6   |  26  |  0.75    Ca    8.8      14 Aug 2023 01:00  Phos  2.8     08-14  Mg     2.5     08-14    TPro  6.1  /  Alb  3.3  /  TBili  0.8  /  DBili  x   /  AST  62<H>  /  ALT  37  /  AlkPhos  70  08-14  POCT Blood Glucose.: 146 mg/dL (08-14-23 @ 11:26)  A1C with Estimated Average Glucose Result: 5.9 % (08-09-23 @ 02:57)

## 2023-08-14 NOTE — PROGRESS NOTE ADULT - SUBJECTIVE AND OBJECTIVE BOX
CTICU  CRITICAL  CARE  attending     Hand off received 					   Pertinent clinical, laboratory, radiographic, hemodynamic, echocardiographic, respiratory data, microbiologic data and chart were reviewed and analyzed frequently throughout the course of the day and night  Patient seen and examined with CTS/ SH attending at bedside    Pt is a 63y , Male, , post op day # 4 s/p CABG x 3V; EF 40%    post op:    hypoxemia requiring NIV with HFNC  Subcutaneous emphysema  Stable small R apical Ptx; resolved    current issues.    unsteady gait with ambulation ( and without walking aids)  post thoracotomy pain      , FAMILY HISTORY:  PAST MEDICAL & SURGICAL HISTORY:  CAD (coronary artery disease)      HTN (hypertension)      Atrial fibrillation        Patient is a 63y old  Male who presents with a chief complaint of CAD     (14 Aug 2023 12:08)      14 system review limited 2/2 post op morbidity    Vital signs, hemodynamic and respiratory parameters were reviewed from the bedside nursing flowsheet.  ICU Vital Signs Last 24 Hrs  T(C): 36 (14 Aug 2023 09:10), Max: 36.6 (13 Aug 2023 20:52)  T(F): 96.8 (14 Aug 2023 09:10), Max: 97.9 (14 Aug 2023 05:11)  HR: 62 (14 Aug 2023 09:00) (58 - 67)  BP: 111/63 (14 Aug 2023 09:00) (111/63 - 143/71)  BP(mean): 82 (14 Aug 2023 09:00) (82 - 98)  ABP: --  ABP(mean): --  RR: 18 (14 Aug 2023 09:00) (14 - 18)  SpO2: 93% (14 Aug 2023 09:00) (90% - 96%)    O2 Parameters below as of 14 Aug 2023 09:00  Patient On (Oxygen Delivery Method): room air          Adult Advanced Hemodynamics Last 24 Hrs  CVP(mm Hg): --  CVP(cm H2O): --  CO: --  CI: --  PA: --  PA(mean): --  PCWP: --  SVR: --  SVRI: --  PVR: --  PVRI: --,     Intake and output was reviewed and the fluid balance was calculated  Daily     Daily   I&O's Summary    13 Aug 2023 07:01  -  14 Aug 2023 07:00  --------------------------------------------------------  IN: 900 mL / OUT: 1675 mL / NET: -775 mL        All lines and drain sites were assessed  [facility]      POCT Blood Glucose.: 146 mg/dL (14 Aug 2023 11:26)    No acute change in mental status  Auscultation of the chest reveals equal bs  Abdomen is soft  Extremities are warm and well perfused  Wounds appear clean and unremarkable  Antibiotics are periop    labs  CBC Full  -  ( 14 Aug 2023 01:00 )  WBC Count : 10.68 K/uL  RBC Count : 3.46 M/uL  Hemoglobin : 10.4 g/dL  Hematocrit : 32.2 %  Platelet Count - Automated : 170 K/uL  Mean Cell Volume : 93.1 fl  Mean Cell Hemoglobin : 30.1 pg  Mean Cell Hemoglobin Concentration : 32.3 gm/dL  Auto Neutrophil # : 8.59 K/uL  Auto Lymphocyte # : 1.03 K/uL  Auto Monocyte # : 0.91 K/uL  Auto Eosinophil # : 0.08 K/uL  Auto Basophil # : 0.02 K/uL  Auto Neutrophil % : 80.5 %  Auto Lymphocyte % : 9.6 %  Auto Monocyte % : 8.5 %  Auto Eosinophil % : 0.7 %  Auto Basophil % : 0.2 %    08-14    133<L>  |  97  |  23  ----------------------------<  127<H>  4.6   |  26  |  0.75    Ca    8.8      14 Aug 2023 01:00  Phos  2.8     08-14  Mg     2.5     08-14    TPro  6.1  /  Alb  3.3  /  TBili  0.8  /  DBili  x   /  AST  62<H>  /  ALT  37  /  AlkPhos  70  08-14    PT/INR - ( 14 Aug 2023 01:00 )   PT: 13.0 sec;   INR: 1.14          PTT - ( 14 Aug 2023 01:00 )  PTT:28.7 sec  The current medications were reviewed   MEDICATIONS  (STANDING):  aMIOdarone    Tablet 400 milliGRAM(s) Oral every 12 hours  aMIOdarone    Tablet   Oral   ascorbic acid 500 milliGRAM(s) Oral two times a day  aspirin enteric coated 81 milliGRAM(s) Oral daily  atorvastatin 40 milliGRAM(s) Oral at bedtime  bisacodyl Suppository 10 milliGRAM(s) Rectal once  chlorhexidine 2% Cloths 1 Application(s) Topical daily  clotrimazole 1% Cream 1 Application(s) Topical two times a day  dextrose 5%. 1000 milliLiter(s) (50 mL/Hr) IV Continuous <Continuous>  dextrose 5%. 1000 milliLiter(s) (100 mL/Hr) IV Continuous <Continuous>  dextrose 50% Injectable 50 milliLiter(s) IV Push every 15 minutes  dextrose 50% Injectable 25 milliLiter(s) IV Push every 15 minutes  glucagon  Injectable 1 milliGRAM(s) IntraMuscular once  heparin   Injectable 5000 Unit(s) SubCutaneous every 8 hours  insulin lispro (ADMELOG) corrective regimen sliding scale   SubCutaneous three times a day before meals  insulin lispro (ADMELOG) corrective regimen sliding scale   SubCutaneous at bedtime  lidocaine   4% Patch 1 Patch Transdermal every 24 hours  metoprolol tartrate 12.5 milliGRAM(s) Oral every 12 hours  mupirocin 2% Ointment 1 Application(s) Both Nostrils two times a day  pantoprazole    Tablet 40 milliGRAM(s) Oral before breakfast  polyethylene glycol 3350 17 Gram(s) Oral daily  senna 2 Tablet(s) Oral at bedtime  sodium chloride 0.9% lock flush 3 milliLiter(s) IV Push every 8 hours  sodium chloride 0.9%. 1000 milliLiter(s) (10 mL/Hr) IV Continuous <Continuous>    MEDICATIONS  (PRN):  acetaminophen     Tablet .. 650 milliGRAM(s) Oral every 6 hours PRN Mild Pain (1 - 3)  dextrose Oral Gel 15 Gram(s) Oral once PRN Blood Glucose LESS THAN 70 milliGRAM(s)/deciliter  oxyCODONE    IR 5 milliGRAM(s) Oral every 6 hours PRN Moderate Pain (4 - 6)       PROBLEM LIST/ ASSESSMENT:  HEALTH ISSUES - PROBLEM Dx:      ,   Patient is a 63y old  Male who presents with a chief complaint of CAD     (14 Aug 2023 12:08)     s/p CABG x 3V      My plan includes :    Diurese to maintain negative fluid balance  continue amiodarone 200 mg bid  Start Eliquis @ 5 mg bid  D/c home when gait is steady or at baseline  Analgesia for thoracotomy pain    close hemodynamic, ventilatory and drain monitoring and management per post op routine    Monitor for arrhythmias and monitor parameters for organ perfusion  monitor neurologic status  Head of the bed should remain elevated to 45 deg .   chest PT and IS will be encouraged  monitor adequacy of oxygenation and ventilation and attempt to wean oxygen  Nutritional goals will be met using po eventually , ensure adequate caloric intake and montior the same  Stress ulcer and VTE prophylaxis will be achieved    Glycemic control is satisfactory  Electrolytes have been repleted as necessary and wound care has been carried out. Pain control has been achieved.   agressive physical therapy and early mobility and ambulation goals will be met   The family was updated about the course and plan  CRITICAL CARE TIME SPENT in evaluation and management, reassessments, review and interpretation of labs and x-rays, ventilator and hemodynamic management, formulating a plan and coordinating care: _30_ MIN.  Time does not include procedural time.  CTICU ATTENDING     					    Pierce Vinson MD

## 2023-08-14 NOTE — DIETITIAN INITIAL EVALUATION ADULT - NSFNSGIIOFT_GEN_A_CORE
08-13-23 @ 07:01  -  08-14-23 @ 07:00  --------------------------------------------------------  OUT:    Chest Tube (mL): 0 mL  Total OUT: 0 mL    Total NET: 0 mL

## 2023-08-14 NOTE — DIETITIAN INITIAL EVALUATION ADULT - OTHER INFO
64yo M with past medical history significant for HTN, HLD, new onset AF (on Eliquis), past spinal surgery for L4-L5 herniation, spinal stenosis (mild deficit on lower left side "ankle weakness"). Initially sent to ED by primary provider for new 's noted during office visit. In ED rate control achieved with IV cardizem bolus followed by PO regimen. Admitted for ischemic w/up. NST abnormal. 8/8 echo with LVEF 45-50%, no significant VHD. Cath: evidence of severe triple vessel CAD (hyper-dominant RCA, occluded LAD and occluded OM1). Given study results decision made to transfer for CABG w/up. Now s/p CABGx3, LIMA-LAD, SVG-RCA, SVG-OM, EF 45-50%, WILLIAMS exclusion and Pulmonary vein isolation 8/10. CT put back on suction with airleak on 8/11. On 8/13: CT removed, stable xray.     Pt seen this AM on 9EAST. Pt appeared to have little concern for RD visit. Negative eye contact noted. Diet Order: DASH TLC. Reports typically not eating much during hospital admissions as to not upset his stomach. Has been eating blander things during admit. Has tried fruit, tuna, salads. Denies GI distress at this time- no nvdc. Ordered for DULCOLAX MIRALAX Senna and Protonix. NKFA. No issues chewing/swallowing. Reported fair PO intake x "awhile PTA." Pt did not elaborate further reporting "its fine." +Pain noted; meds ordered. Skin: Wilfred 20, 1+Generlaized Edema, No pressure ulcers (SX site noted); Vit C ordered. Labs: POCT 150 119, , Na 133, A1c 5.9% (preDM), Na133, , HDL 36.   Please see below for nutritions recommendations. Spoke with team.

## 2023-08-14 NOTE — DIETITIAN INITIAL EVALUATION ADULT - PERSON TAUGHT/METHOD
Attempted diet education, pt with limited concern for RD visit today therefore limited education feasible this AM./verbal instruction/patient instructed

## 2023-08-14 NOTE — PROGRESS NOTE ADULT - SUBJECTIVE AND OBJECTIVE BOX
Patient discussed on morning rounds with Dr. Medina   9E to 9L transfer/acceptance note     Operation / Date: 8/10/23 CABGx3, WILLIAMS exclusion and Pulmonary Vein Isolation     SUBJECTIVE ASSESSMENT:      Vital Signs Last 24 Hrs  T(C): 36.2 (14 Aug 2023 14:38), Max: 36.6 (13 Aug 2023 20:52)  T(F): 97.2 (14 Aug 2023 14:38), Max: 97.9 (14 Aug 2023 05:11)  HR: 65 (14 Aug 2023 15:00) (58 - 67)  BP: 130/71 (14 Aug 2023 15:00) (111/63 - 143/71)  BP(mean): 92 (14 Aug 2023 15:00) (82 - 98)  RR: 18 (14 Aug 2023 15:00) (14 - 18)  SpO2: 95% (14 Aug 2023 15:00) (90% - 96%)    Parameters below as of 14 Aug 2023 15:00  Patient On (Oxygen Delivery Method): room air      I&O's Detail    13 Aug 2023 07:01  -  14 Aug 2023 07:00  --------------------------------------------------------  IN:    Oral Fluid: 900 mL  Total IN: 900 mL    OUT:    Chest Tube (mL): 0 mL    Voided (mL): 1675 mL  Total OUT: 1675 mL    Total NET: -775 mL          CHEST TUBE:    FERNANDO DRAIN:    EPICARDIAL WIRES:   TIE LEDY:  INDIA:     PHYSICAL EXAM:    General:     Neurological:    Cardiovascular:    Respiratory:    Gastrointestinal:    Extremities:    Vascular:    Incision Sites:    LABS:                        10.4   10.68 )-----------( 170      ( 14 Aug 2023 01:00 )             32.2       COUMADIN:  No    PT/INR - ( 14 Aug 2023 01:00 )   PT: 13.0 sec;   INR: 1.14          PTT - ( 14 Aug 2023 01:00 )  PTT:28.7 sec    08-14    133<L>  |  97  |  23  ----------------------------<  127<H>  4.6   |  26  |  0.75    Ca    8.8      14 Aug 2023 01:00  Phos  2.8     08-14  Mg     2.5     08-14    TPro  6.1  /  Alb  3.3  /  TBili  0.8  /  DBili  x   /  AST  62<H>  /  ALT  37  /  AlkPhos  70  08-14      Urinalysis Basic - ( 14 Aug 2023 01:00 )    Color: x / Appearance: x / SG: x / pH: x  Gluc: 127 mg/dL / Ketone: x  / Bili: x / Urobili: x   Blood: x / Protein: x / Nitrite: x   Leuk Esterase: x / RBC: x / WBC x   Sq Epi: x / Non Sq Epi: x / Bacteria: x        MEDICATIONS  (STANDING):  aMIOdarone    Tablet 400 milliGRAM(s) Oral every 12 hours  ascorbic acid 500 milliGRAM(s) Oral two times a day  aspirin enteric coated 81 milliGRAM(s) Oral daily  atorvastatin 40 milliGRAM(s) Oral at bedtime  bisacodyl Suppository 10 milliGRAM(s) Rectal once  chlorhexidine 2% Cloths 1 Application(s) Topical daily  clotrimazole 1% Cream 1 Application(s) Topical two times a day  glucagon  Injectable 1 milliGRAM(s) IntraMuscular once  heparin   Injectable 5000 Unit(s) SubCutaneous every 8 hours  insulin lispro (ADMELOG) corrective regimen sliding scale   SubCutaneous at bedtime  insulin lispro (ADMELOG) corrective regimen sliding scale   SubCutaneous three times a day before meals  lidocaine   4% Patch 1 Patch Transdermal every 24 hours  metoprolol tartrate 12.5 milliGRAM(s) Oral every 12 hours  mupirocin 2% Ointment 1 Application(s) Both Nostrils two times a day  pantoprazole    Tablet 40 milliGRAM(s) Oral before breakfast  polyethylene glycol 3350 17 Gram(s) Oral daily  senna 2 Tablet(s) Oral at bedtime    MEDICATIONS  (PRN):  acetaminophen     Tablet .. 650 milliGRAM(s) Oral every 6 hours PRN Mild Pain (1 - 3)  dextrose Oral Gel 15 Gram(s) Oral once PRN Blood Glucose LESS THAN 70 milliGRAM(s)/deciliter  oxyCODONE    IR 5 milliGRAM(s) Oral every 6 hours PRN Moderate Pain (4 - 6)        RADIOLOGY & ADDITIONAL TESTS:     Patient discussed on morning rounds with Dr. Medina   9E to 9L transfer/acceptance note     Operation / Date: 8/10/23 CABGx3, WILLIAMS exclusion and Pulmonary Vein Isolation     SUBJECTIVE ASSESSMENT:  Patient seen this afternoon at bedside, doing well and not offering any complaints at this time. States he ambulated multiple times today and is feeling up to going home tomorrow. He had a normal bowel movement today.       Vital Signs Last 24 Hrs  T(C): 36.2 (14 Aug 2023 14:38), Max: 36.6 (13 Aug 2023 20:52)  T(F): 97.2 (14 Aug 2023 14:38), Max: 97.9 (14 Aug 2023 05:11)  HR: 65 (14 Aug 2023 15:00) (58 - 67)  BP: 130/71 (14 Aug 2023 15:00) (111/63 - 143/71)  BP(mean): 92 (14 Aug 2023 15:00) (82 - 98)  RR: 18 (14 Aug 2023 15:00) (14 - 18)  SpO2: 95% (14 Aug 2023 15:00) (90% - 96%)    Parameters below as of 14 Aug 2023 15:00  Patient On (Oxygen Delivery Method): room air      I&O's Detail    13 Aug 2023 07:01  -  14 Aug 2023 07:00  --------------------------------------------------------  IN:    Oral Fluid: 900 mL  Total IN: 900 mL    OUT:    Chest Tube (mL): 0 mL    Voided (mL): 1675 mL  Total OUT: 1675 mL    Total NET: -775 mL    CHEST TUBE:  No  FERNANDO DRAIN:  No  EPICARDIAL WIRES: Yes   TIE DOWNS: No  OSBORNE: No    PHYSICAL EXAM:    General: Patient sitting comfortably in a recliner, no acute distress     Neurological: Alert and oriented. No focal neurological deficits     Cardiovascular: S1S2, RRR, no murmurs appreciated on exam     Respiratory: Clear to ausculation bilaterally     Gastrointestinal: Abdomen soft, non tender, non distended     Extremities: Warm and well perfused. No edema or calf tenderness     Vascular: Peripheral pulses 2+ bilaterally     Incision Sites: Sternotomy incision C/D/I, no drainage or surrounding erythema. No sternal click   R EVH Site C/D/I     LABS:                        10.4   10.68 )-----------( 170      ( 14 Aug 2023 01:00 )             32.2       COUMADIN:  No    PT/INR - ( 14 Aug 2023 01:00 )   PT: 13.0 sec;   INR: 1.14          PTT - ( 14 Aug 2023 01:00 )  PTT:28.7 sec    08-14    133<L>  |  97  |  23  ----------------------------<  127<H>  4.6   |  26  |  0.75    Ca    8.8      14 Aug 2023 01:00  Phos  2.8     08-14  Mg     2.5     08-14    TPro  6.1  /  Alb  3.3  /  TBili  0.8  /  DBili  x   /  AST  62<H>  /  ALT  37  /  AlkPhos  70  08-14      Urinalysis Basic - ( 14 Aug 2023 01:00 )    Color: x / Appearance: x / SG: x / pH: x  Gluc: 127 mg/dL / Ketone: x  / Bili: x / Urobili: x   Blood: x / Protein: x / Nitrite: x   Leuk Esterase: x / RBC: x / WBC x   Sq Epi: x / Non Sq Epi: x / Bacteria: x        MEDICATIONS  (STANDING):  aMIOdarone    Tablet 400 milliGRAM(s) Oral every 12 hours  ascorbic acid 500 milliGRAM(s) Oral two times a day  aspirin enteric coated 81 milliGRAM(s) Oral daily  atorvastatin 40 milliGRAM(s) Oral at bedtime  bisacodyl Suppository 10 milliGRAM(s) Rectal once  chlorhexidine 2% Cloths 1 Application(s) Topical daily  clotrimazole 1% Cream 1 Application(s) Topical two times a day  glucagon  Injectable 1 milliGRAM(s) IntraMuscular once  heparin   Injectable 5000 Unit(s) SubCutaneous every 8 hours  insulin lispro (ADMELOG) corrective regimen sliding scale   SubCutaneous at bedtime  insulin lispro (ADMELOG) corrective regimen sliding scale   SubCutaneous three times a day before meals  lidocaine   4% Patch 1 Patch Transdermal every 24 hours  metoprolol tartrate 12.5 milliGRAM(s) Oral every 12 hours  mupirocin 2% Ointment 1 Application(s) Both Nostrils two times a day  pantoprazole    Tablet 40 milliGRAM(s) Oral before breakfast  polyethylene glycol 3350 17 Gram(s) Oral daily  senna 2 Tablet(s) Oral at bedtime    MEDICATIONS  (PRN):  acetaminophen     Tablet .. 650 milliGRAM(s) Oral every 6 hours PRN Mild Pain (1 - 3)  dextrose Oral Gel 15 Gram(s) Oral once PRN Blood Glucose LESS THAN 70 milliGRAM(s)/deciliter  oxyCODONE    IR 5 milliGRAM(s) Oral every 6 hours PRN Moderate Pain (4 - 6)        RADIOLOGY & ADDITIONAL TESTS:

## 2023-08-15 ENCOUNTER — TRANSCRIPTION ENCOUNTER (OUTPATIENT)
Age: 63
End: 2023-08-15

## 2023-08-15 ENCOUNTER — NON-APPOINTMENT (OUTPATIENT)
Age: 63
End: 2023-08-15

## 2023-08-15 VITALS
RESPIRATION RATE: 16 BRPM | SYSTOLIC BLOOD PRESSURE: 138 MMHG | HEART RATE: 66 BPM | DIASTOLIC BLOOD PRESSURE: 67 MMHG | OXYGEN SATURATION: 97 %

## 2023-08-15 PROBLEM — I10 ESSENTIAL (PRIMARY) HYPERTENSION: Chronic | Status: ACTIVE | Noted: 2023-08-09

## 2023-08-15 PROBLEM — I25.10 ATHEROSCLEROTIC HEART DISEASE OF NATIVE CORONARY ARTERY WITHOUT ANGINA PECTORIS: Chronic | Status: ACTIVE | Noted: 2023-08-09

## 2023-08-15 PROBLEM — I48.91 UNSPECIFIED ATRIAL FIBRILLATION: Chronic | Status: ACTIVE | Noted: 2023-08-09

## 2023-08-15 PROBLEM — Z00.00 ENCOUNTER FOR PREVENTIVE HEALTH EXAMINATION: Status: ACTIVE | Noted: 2023-08-15

## 2023-08-15 LAB
ALBUMIN SERPL ELPH-MCNC: 3.1 G/DL — LOW (ref 3.3–5)
ALP SERPL-CCNC: 71 U/L — SIGNIFICANT CHANGE UP (ref 40–120)
ALT FLD-CCNC: 48 U/L — HIGH (ref 10–45)
ANION GAP SERPL CALC-SCNC: 10 MMOL/L — SIGNIFICANT CHANGE UP (ref 5–17)
APTT BLD: 29.3 SEC — SIGNIFICANT CHANGE UP (ref 24.5–35.6)
AST SERPL-CCNC: 53 U/L — HIGH (ref 10–40)
BASOPHILS # BLD AUTO: 0.01 K/UL — SIGNIFICANT CHANGE UP (ref 0–0.2)
BASOPHILS NFR BLD AUTO: 0.1 % — SIGNIFICANT CHANGE UP (ref 0–2)
BILIRUB SERPL-MCNC: 0.8 MG/DL — SIGNIFICANT CHANGE UP (ref 0.2–1.2)
BUN SERPL-MCNC: 18 MG/DL — SIGNIFICANT CHANGE UP (ref 7–23)
CALCIUM SERPL-MCNC: 8.5 MG/DL — SIGNIFICANT CHANGE UP (ref 8.4–10.5)
CHLORIDE SERPL-SCNC: 98 MMOL/L — SIGNIFICANT CHANGE UP (ref 96–108)
CO2 SERPL-SCNC: 26 MMOL/L — SIGNIFICANT CHANGE UP (ref 22–31)
CREAT SERPL-MCNC: 0.81 MG/DL — SIGNIFICANT CHANGE UP (ref 0.5–1.3)
EGFR: 99 ML/MIN/1.73M2 — SIGNIFICANT CHANGE UP
EOSINOPHIL # BLD AUTO: 0.19 K/UL — SIGNIFICANT CHANGE UP (ref 0–0.5)
EOSINOPHIL NFR BLD AUTO: 2.8 % — SIGNIFICANT CHANGE UP (ref 0–6)
GLUCOSE SERPL-MCNC: 126 MG/DL — HIGH (ref 70–99)
HCT VFR BLD CALC: 31.7 % — LOW (ref 39–50)
HGB BLD-MCNC: 10.1 G/DL — LOW (ref 13–17)
IMM GRANULOCYTES NFR BLD AUTO: 0.7 % — SIGNIFICANT CHANGE UP (ref 0–0.9)
INR BLD: 1.15 — SIGNIFICANT CHANGE UP (ref 0.85–1.18)
LYMPHOCYTES # BLD AUTO: 0.97 K/UL — LOW (ref 1–3.3)
LYMPHOCYTES # BLD AUTO: 14.1 % — SIGNIFICANT CHANGE UP (ref 13–44)
MAGNESIUM SERPL-MCNC: 2.4 MG/DL — SIGNIFICANT CHANGE UP (ref 1.6–2.6)
MCHC RBC-ENTMCNC: 30 PG — SIGNIFICANT CHANGE UP (ref 27–34)
MCHC RBC-ENTMCNC: 31.9 GM/DL — LOW (ref 32–36)
MCV RBC AUTO: 94.1 FL — SIGNIFICANT CHANGE UP (ref 80–100)
MONOCYTES # BLD AUTO: 0.8 K/UL — SIGNIFICANT CHANGE UP (ref 0–0.9)
MONOCYTES NFR BLD AUTO: 11.6 % — SIGNIFICANT CHANGE UP (ref 2–14)
NEUTROPHILS # BLD AUTO: 4.85 K/UL — SIGNIFICANT CHANGE UP (ref 1.8–7.4)
NEUTROPHILS NFR BLD AUTO: 70.7 % — SIGNIFICANT CHANGE UP (ref 43–77)
NRBC # BLD: 0 /100 WBCS — SIGNIFICANT CHANGE UP (ref 0–0)
PHOSPHATE SERPL-MCNC: 3.1 MG/DL — SIGNIFICANT CHANGE UP (ref 2.5–4.5)
PLATELET # BLD AUTO: 202 K/UL — SIGNIFICANT CHANGE UP (ref 150–400)
POTASSIUM SERPL-MCNC: 3.9 MMOL/L — SIGNIFICANT CHANGE UP (ref 3.5–5.3)
POTASSIUM SERPL-SCNC: 3.9 MMOL/L — SIGNIFICANT CHANGE UP (ref 3.5–5.3)
PROT SERPL-MCNC: 5.9 G/DL — LOW (ref 6–8.3)
PROTHROM AB SERPL-ACNC: 13.1 SEC — HIGH (ref 9.5–13)
RBC # BLD: 3.37 M/UL — LOW (ref 4.2–5.8)
RBC # FLD: 13.2 % — SIGNIFICANT CHANGE UP (ref 10.3–14.5)
SODIUM SERPL-SCNC: 134 MMOL/L — LOW (ref 135–145)
WBC # BLD: 6.87 K/UL — SIGNIFICANT CHANGE UP (ref 3.8–10.5)
WBC # FLD AUTO: 6.87 K/UL — SIGNIFICANT CHANGE UP (ref 3.8–10.5)

## 2023-08-15 PROCEDURE — 80061 LIPID PANEL: CPT

## 2023-08-15 PROCEDURE — 97116 GAIT TRAINING THERAPY: CPT

## 2023-08-15 PROCEDURE — 83605 ASSAY OF LACTIC ACID: CPT

## 2023-08-15 PROCEDURE — C1889: CPT

## 2023-08-15 PROCEDURE — 85027 COMPLETE CBC AUTOMATED: CPT

## 2023-08-15 PROCEDURE — C1751: CPT

## 2023-08-15 PROCEDURE — 94150 VITAL CAPACITY TEST: CPT

## 2023-08-15 PROCEDURE — 71046 X-RAY EXAM CHEST 2 VIEWS: CPT

## 2023-08-15 PROCEDURE — 85576 BLOOD PLATELET AGGREGATION: CPT

## 2023-08-15 PROCEDURE — 82550 ASSAY OF CK (CPK): CPT

## 2023-08-15 PROCEDURE — 84100 ASSAY OF PHOSPHORUS: CPT

## 2023-08-15 PROCEDURE — 83036 HEMOGLOBIN GLYCOSYLATED A1C: CPT

## 2023-08-15 PROCEDURE — 82947 ASSAY GLUCOSE BLOOD QUANT: CPT

## 2023-08-15 PROCEDURE — 86900 BLOOD TYPING SEROLOGIC ABO: CPT

## 2023-08-15 PROCEDURE — 82330 ASSAY OF CALCIUM: CPT

## 2023-08-15 PROCEDURE — 83735 ASSAY OF MAGNESIUM: CPT

## 2023-08-15 PROCEDURE — 86803 HEPATITIS C AB TEST: CPT

## 2023-08-15 PROCEDURE — 86891 AUTOLOGOUS BLOOD OP SALVAGE: CPT

## 2023-08-15 PROCEDURE — P9045: CPT

## 2023-08-15 PROCEDURE — 81001 URINALYSIS AUTO W/SCOPE: CPT

## 2023-08-15 PROCEDURE — 85610 PROTHROMBIN TIME: CPT

## 2023-08-15 PROCEDURE — 93880 EXTRACRANIAL BILAT STUDY: CPT

## 2023-08-15 PROCEDURE — 82010 KETONE BODYS QUAN: CPT

## 2023-08-15 PROCEDURE — 97162 PT EVAL MOD COMPLEX 30 MIN: CPT

## 2023-08-15 PROCEDURE — 84295 ASSAY OF SERUM SODIUM: CPT

## 2023-08-15 PROCEDURE — 82962 GLUCOSE BLOOD TEST: CPT

## 2023-08-15 PROCEDURE — 84132 ASSAY OF SERUM POTASSIUM: CPT

## 2023-08-15 PROCEDURE — 93306 TTE W/DOPPLER COMPLETE: CPT

## 2023-08-15 PROCEDURE — 36415 COLL VENOUS BLD VENIPUNCTURE: CPT

## 2023-08-15 PROCEDURE — 71045 X-RAY EXAM CHEST 1 VIEW: CPT

## 2023-08-15 PROCEDURE — 80048 BASIC METABOLIC PNL TOTAL CA: CPT

## 2023-08-15 PROCEDURE — 93005 ELECTROCARDIOGRAM TRACING: CPT

## 2023-08-15 PROCEDURE — 86901 BLOOD TYPING SEROLOGIC RH(D): CPT

## 2023-08-15 PROCEDURE — C1729: CPT

## 2023-08-15 PROCEDURE — 86850 RBC ANTIBODY SCREEN: CPT

## 2023-08-15 PROCEDURE — 84484 ASSAY OF TROPONIN QUANT: CPT

## 2023-08-15 PROCEDURE — 84443 ASSAY THYROID STIM HORMONE: CPT

## 2023-08-15 PROCEDURE — 86923 COMPATIBILITY TEST ELECTRIC: CPT

## 2023-08-15 PROCEDURE — 85025 COMPLETE CBC W/AUTO DIFF WBC: CPT

## 2023-08-15 PROCEDURE — 82803 BLOOD GASES ANY COMBINATION: CPT

## 2023-08-15 PROCEDURE — 82553 CREATINE MB FRACTION: CPT

## 2023-08-15 PROCEDURE — 85730 THROMBOPLASTIN TIME PARTIAL: CPT

## 2023-08-15 PROCEDURE — 94002 VENT MGMT INPAT INIT DAY: CPT

## 2023-08-15 PROCEDURE — 85014 HEMATOCRIT: CPT

## 2023-08-15 PROCEDURE — 83880 ASSAY OF NATRIURETIC PEPTIDE: CPT

## 2023-08-15 PROCEDURE — 80053 COMPREHEN METABOLIC PANEL: CPT

## 2023-08-15 RX ORDER — ASPIRIN/CALCIUM CARB/MAGNESIUM 324 MG
1 TABLET ORAL
Qty: 30 | Refills: 0
Start: 2023-08-15 | End: 2023-09-13

## 2023-08-15 RX ORDER — CLOTRIMAZOLE 10 MG/G
1 CREAM TOPICAL TWICE DAILY
Refills: 0 | Status: ACTIVE | COMMUNITY
Start: 2023-08-15

## 2023-08-15 RX ORDER — AMLODIPINE BESYLATE 2.5 MG/1
1 TABLET ORAL
Refills: 0 | DISCHARGE

## 2023-08-15 RX ORDER — ACETAMINOPHEN 500 MG
2 TABLET ORAL
Qty: 48 | Refills: 0
Start: 2023-08-15 | End: 2023-08-28

## 2023-08-15 RX ORDER — PANTOPRAZOLE SODIUM 20 MG/1
1 TABLET, DELAYED RELEASE ORAL
Qty: 30 | Refills: 0
Start: 2023-08-15 | End: 2023-09-13

## 2023-08-15 RX ORDER — APIXABAN 5 MG/1
5 TABLET, FILM COATED ORAL
Qty: 60 | Refills: 0 | Status: ACTIVE | COMMUNITY
Start: 2023-08-15

## 2023-08-15 RX ORDER — PANTOPRAZOLE 40 MG/1
40 TABLET, DELAYED RELEASE ORAL
Qty: 30 | Refills: 0 | Status: ACTIVE | COMMUNITY
Start: 2023-08-15

## 2023-08-15 RX ORDER — POTASSIUM CHLORIDE 1500 MG/1
20 TABLET, EXTENDED RELEASE ORAL DAILY
Refills: 0 | Status: ACTIVE | COMMUNITY
Start: 2023-08-15

## 2023-08-15 RX ORDER — AMIODARONE HYDROCHLORIDE 400 MG/1
1 TABLET ORAL
Qty: 60 | Refills: 0
Start: 2023-08-15 | End: 2023-09-13

## 2023-08-15 RX ORDER — POLYETHYLENE GLYCOL 3350 17 G/17G
17 POWDER, FOR SOLUTION ORAL
Qty: 119 | Refills: 0
Start: 2023-08-15 | End: 2023-08-21

## 2023-08-15 RX ORDER — FUROSEMIDE 40 MG/1
40 TABLET ORAL DAILY
Refills: 0 | Status: ACTIVE | COMMUNITY
Start: 2023-08-15

## 2023-08-15 RX ORDER — AMIODARONE HYDROCHLORIDE 400 MG/1
1 TABLET ORAL
Qty: 14 | Refills: 0
Start: 2023-08-15 | End: 2023-08-21

## 2023-08-15 RX ORDER — FUROSEMIDE 40 MG
1 TABLET ORAL
Qty: 7 | Refills: 0
Start: 2023-08-15 | End: 2023-08-21

## 2023-08-15 RX ORDER — METOPROLOL TARTRATE 25 MG/1
25 TABLET, FILM COATED ORAL TWICE DAILY
Qty: 30 | Refills: 0 | Status: ACTIVE | COMMUNITY
Start: 2023-08-15

## 2023-08-15 RX ORDER — GABAPENTIN 400 MG/1
1 CAPSULE ORAL
Qty: 60 | Refills: 0
Start: 2023-08-15 | End: 2023-09-13

## 2023-08-15 RX ORDER — ATORVASTATIN CALCIUM 40 MG/1
40 TABLET, FILM COATED ORAL
Qty: 30 | Refills: 0 | Status: ACTIVE | COMMUNITY
Start: 2023-08-15

## 2023-08-15 RX ORDER — POTASSIUM CHLORIDE 20 MEQ
1 PACKET (EA) ORAL
Qty: 7 | Refills: 0
Start: 2023-08-15 | End: 2023-08-21

## 2023-08-15 RX ORDER — APIXABAN 2.5 MG/1
5 TABLET, FILM COATED ORAL EVERY 12 HOURS
Refills: 0 | Status: DISCONTINUED | OUTPATIENT
Start: 2023-08-15 | End: 2023-08-15

## 2023-08-15 RX ORDER — OXYCODONE HYDROCHLORIDE 5 MG/1
1 TABLET ORAL
Qty: 28 | Refills: 0
Start: 2023-08-15 | End: 2023-08-21

## 2023-08-15 RX ORDER — SENNOSIDES 8.6 MG TABLETS 8.6 MG/1
8.6 TABLET ORAL
Qty: 60 | Refills: 0 | Status: ACTIVE | COMMUNITY
Start: 2023-08-15

## 2023-08-15 RX ORDER — ATORVASTATIN CALCIUM 80 MG/1
1 TABLET, FILM COATED ORAL
Qty: 30 | Refills: 0
Start: 2023-08-15 | End: 2023-09-13

## 2023-08-15 RX ORDER — ATORVASTATIN CALCIUM 80 MG/1
1 TABLET, FILM COATED ORAL
Refills: 0 | DISCHARGE

## 2023-08-15 RX ORDER — AMIODARONE HYDROCHLORIDE 400 MG/1
1 TABLET ORAL
Qty: 30 | Refills: 0
Start: 2023-08-15 | End: 2023-09-13

## 2023-08-15 RX ORDER — METOPROLOL TARTRATE 50 MG
0.5 TABLET ORAL
Qty: 30 | Refills: 0
Start: 2023-08-15 | End: 2023-09-13

## 2023-08-15 RX ORDER — AMIODARONE HYDROCHLORIDE 200 MG/1
200 TABLET ORAL
Refills: 0 | Status: ACTIVE | COMMUNITY
Start: 2023-08-15

## 2023-08-15 RX ORDER — POLYETHYLENE GLYCOL 3350 17 G/17G
17 POWDER, FOR SOLUTION ORAL DAILY
Qty: 1 | Refills: 0 | Status: ACTIVE | COMMUNITY
Start: 2023-08-15

## 2023-08-15 RX ORDER — SENNA PLUS 8.6 MG/1
2 TABLET ORAL
Qty: 14 | Refills: 0
Start: 2023-08-15 | End: 2023-08-21

## 2023-08-15 RX ORDER — OXYCODONE 5 MG/1
5 TABLET ORAL EVERY 6 HOURS
Refills: 0 | Status: ACTIVE | COMMUNITY
Start: 2023-08-15

## 2023-08-15 RX ORDER — GABAPENTIN 100 MG/1
100 CAPSULE ORAL TWICE DAILY
Refills: 0 | Status: ACTIVE | COMMUNITY
Start: 2023-08-15

## 2023-08-15 RX ORDER — APIXABAN 2.5 MG/1
1 TABLET, FILM COATED ORAL
Qty: 60 | Refills: 0
Start: 2023-08-15 | End: 2023-09-13

## 2023-08-15 RX ORDER — ACETAMINOPHEN 325 MG/1
325 TABLET ORAL EVERY 6 HOURS
Refills: 0 | Status: ACTIVE | COMMUNITY
Start: 2023-08-15

## 2023-08-15 RX ADMIN — MUPIROCIN 1 APPLICATION(S): 20 OINTMENT TOPICAL at 06:00

## 2023-08-15 RX ADMIN — POLYETHYLENE GLYCOL 3350 17 GRAM(S): 17 POWDER, FOR SOLUTION ORAL at 12:05

## 2023-08-15 RX ADMIN — Medication 12.5 MILLIGRAM(S): at 05:56

## 2023-08-15 RX ADMIN — AMIODARONE HYDROCHLORIDE 400 MILLIGRAM(S): 400 TABLET ORAL at 05:58

## 2023-08-15 RX ADMIN — PANTOPRAZOLE SODIUM 40 MILLIGRAM(S): 20 TABLET, DELAYED RELEASE ORAL at 06:09

## 2023-08-15 RX ADMIN — LIDOCAINE 1 PATCH: 4 CREAM TOPICAL at 06:33

## 2023-08-15 RX ADMIN — HEPARIN SODIUM 5000 UNIT(S): 5000 INJECTION INTRAVENOUS; SUBCUTANEOUS at 05:56

## 2023-08-15 RX ADMIN — GABAPENTIN 100 MILLIGRAM(S): 400 CAPSULE ORAL at 05:56

## 2023-08-15 RX ADMIN — Medication 20 MILLIGRAM(S): at 05:56

## 2023-08-15 RX ADMIN — SODIUM CHLORIDE 3 MILLILITER(S): 9 INJECTION INTRAMUSCULAR; INTRAVENOUS; SUBCUTANEOUS at 06:33

## 2023-08-15 RX ADMIN — Medication 81 MILLIGRAM(S): at 12:05

## 2023-08-15 RX ADMIN — Medication 1 APPLICATION(S): at 05:58

## 2023-08-15 RX ADMIN — Medication 500 MILLIGRAM(S): at 05:57

## 2023-08-15 RX ADMIN — APIXABAN 5 MILLIGRAM(S): 2.5 TABLET, FILM COATED ORAL at 12:09

## 2023-08-15 NOTE — DISCHARGE NOTE PROVIDER - NSDCMRMEDTOKEN_GEN_ALL_CORE_FT
acetaminophen 325 mg oral tablet: 2 tab(s) orally every 6 hours as needed for Mild Pain (1 - 3)  amiodarone 200 mg oral tablet: 1 tab(s) orally 2 times a day  amiodarone 200 mg oral tablet: 1 tab(s) orally Please start taking once a day starting on 8/23/23  aspirin 81 mg oral delayed release tablet: 1 tab(s) orally once a day  atorvastatin 40 mg oral tablet: 1 tab(s) orally once a day (at bedtime)  clotrimazole 1% topical cream: Apply topically to affected area 2 times a day 1 Apply topically to affected area 2 times a day  Eliquis 5 mg oral tablet: 1 tab(s) orally 2 times a day  gabapentin 100 mg oral capsule: 1 cap(s) orally 2 times a day  Lasix 40 mg oral tablet: 1 tab(s) orally once a day  metoprolol tartrate 25 mg oral tablet: 0.5 tab(s) orally 2 times a day  oxyCODONE 5 mg oral tablet: 1 tab(s) orally every 6 hours as needed for  severe pain MDD: 4  pantoprazole 40 mg oral delayed release tablet: 1 tab(s) orally once a day (before a meal)  polyethylene glycol 3350 oral powder for reconstitution: 17 gram(s) orally once a day  Potassium Chloride (Eqv-Klor-Con M20) 20 mEq oral tablet, extended release: 1 tab(s) orally once a day  senna leaf extract oral tablet: 2 tab(s) orally once a day (at bedtime)   acetaminophen 325 mg oral tablet: 2 tab(s) orally every 6 hours as needed for Mild Pain (1 - 3)  amiodarone 200 mg oral tablet: 1 tab(s) orally 2 times a day  amiodarone 200 mg oral tablet: 1 tab(s) orally 2 times a day please take 1 tab twice a day for 7 days and then starting on 8/23/23 take 1 tab once a day  aspirin 81 mg oral delayed release tablet: 1 tab(s) orally once a day  atorvastatin 40 mg oral tablet: 1 tab(s) orally once a day (at bedtime)  clotrimazole 1% topical cream: Apply topically to affected area 2 times a day 1 Apply topically to affected area 2 times a day  Eliquis 5 mg oral tablet: 1 tab(s) orally 2 times a day  gabapentin 100 mg oral capsule: 1 cap(s) orally 2 times a day  Lasix 40 mg oral tablet: 1 tab(s) orally once a day  metoprolol tartrate 25 mg oral tablet: 0.5 tab(s) orally 2 times a day  oxyCODONE 5 mg oral tablet: 1 tab(s) orally every 6 hours as needed for  severe pain MDD: 4  pantoprazole 40 mg oral delayed release tablet: 1 tab(s) orally once a day (before a meal)  polyethylene glycol 3350 oral powder for reconstitution: 17 gram(s) orally once a day  Potassium Chloride (Eqv-Klor-Con M20) 20 mEq oral tablet, extended release: 1 tab(s) orally once a day  senna leaf extract oral tablet: 2 tab(s) orally once a day (at bedtime)

## 2023-08-15 NOTE — DISCHARGE NOTE NURSING/CASE MANAGEMENT/SOCIAL WORK - NSSCNAMETXT_GEN_ALL_CORE
[facility] care services & [facility] Follow Your Heart Program

## 2023-08-15 NOTE — DISCHARGE NOTE PROVIDER - CARE PROVIDERS DIRECT ADDRESSES
,[email or web address]_Unknown

## 2023-08-15 NOTE — DISCHARGE NOTE PROVIDER - CARE PROVIDER_API CALL
LIDA Quintero  Thoracic and Cardiac Surgery  [address]  [place] [address]  Phone: [phone]  Fax: [phone]  Follow Up Time:    LIDA Quintero  Thoracic and Cardiac Surgery  [address], [address]  [place] [address]  Phone: [phone]  Fax: [phone]  Follow Up Time:     Cayla Quevedo  Cardiovascular Disease  [address] [place] [id number]  Phone: [phone]  Fax: [phone]  Follow Up Time:

## 2023-08-15 NOTE — DISCHARGE NOTE PROVIDER - HOSPITAL COURSE
63 year old male with PMHx of HTN, HLD, Atrial fibrillation (on eliquis), spinal stenosis and L4-L5 herniation s/p spinal surgery initially presented to the ED for new onset Atrial fibrillation and was admitted for ischemic workup revealing 3V CAD on cardiac cath. Post cath he was transferred to [facility] under the care of  Dr. Quintero for surgical evaluation on 8/9. Preoperative workup was completed and patient underwent CABGx3, WILLIAMS exclusion and Pulmonary vein isolation with Dr. Quintero on 8/10. Procedure was uncomplicated and he was transferred to CT ICU post operatively intubated and stable. He was extubated POD#0. On POD#1 his substernal  chest tube was removed following clamp trial, post removal patient developed subcutaneous emphysema which improved with his pleural chest tube placed on suction. His chest tube was subsequently removed on POD#3 without any complications. He was medically stable for transfer to [facility] on POD#4. We worked with PT who felt that the patient was unsteady on his feet and should go home with a rolling walker. This morning patient feels well, ambulated off of oxygen and his pain is well controlled. Pacing wires were removed and eliquis was restarted. Labs this AM revealed a slight increase in his LFTs (AST 53/ ALT 48) while on amio. Spoke with covering surgical team about LFT bump and decision was made to continue him on modified amio load with a plan to recheck the LFTs at his office visit next week.       GEN: NAD, looks comfortable  Neuro: A&Ox3.  No focal deficits.  Moving all extremities.   HEENT: No obvious abnormalities  CV: S1S2, regular, no murmurs appreciated.  No carotid bruits.  No JVD  Lungs: fine bibasilar crackles. No rhonchi or wheeze.   ABD: Soft, non-tender, non-distended.  +Bowel sounds  EXT: Warm and well perfused. Left leg with circular erythematous flaky rash. Distal pulses palpable in all distal extremities. Bilateral onychomycosis.   Musculoskeletal: Moving all extremities with normal ROM, no joint swelling  Incision: MSI c/d/i       63 year old male with PMHx of HTN, HLD, Atrial fibrillation (on eliquis), spinal stenosis and L4-L5 herniation s/p spinal surgery initially presented to the ED for new onset Atrial fibrillation and was admitted for ischemic workup revealing 3V CAD on cardiac cath. Post cath he was transferred to [facility] under the care of  Dr. Quintero for surgical evaluation on 8/9. Preoperative workup was completed and patient underwent CABGx3, WILLIAMS exclusion and Pulmonary vein isolation with Dr. Quintero on 8/10. Procedure was uncomplicated and he was transferred to CT ICU post operatively intubated and stable. He was extubated POD#0. On POD#1 his substernal  chest tube was removed following clamp trial, post removal patient developed subcutaneous emphysema which improved with his pleural chest tube placed on suction. His chest tube was subsequently removed on POD#3 without any complications. He was medically stable for transfer to [facility] on POD#4. We worked with PT who felt that the patient was unsteady on his feet and should go home with a rolling walker. This morning patient feels well, ambulated off of oxygen and his pain is well controlled. Pacing wires were removed and eliquis was restarted. Labs this AM revealed a slight increase in his LFTs (AST 53/ ALT 48) while on amio. Spoke with covering surgical team about LFT bump and decision was made to continue him on modified amio load with a plan to recheck the LFTs at his office visit next week. Patient will discharged with an MCOT in place.         GEN: NAD, looks comfortable  Neuro: A&Ox3.  No focal deficits.  Moving all extremities.   HEENT: No obvious abnormalities  CV: S1S2, regular, no murmurs appreciated.  No carotid bruits.  No JVD  Lungs: fine bibasilar crackles. No rhonchi or wheeze.   ABD: Soft, non-tender, non-distended.  +Bowel sounds  EXT: Warm and well perfused. Left leg with circular erythematous flaky rash. Distal pulses palpable in all distal extremities. Bilateral onychomycosis.   Musculoskeletal: Moving all extremities with normal ROM, no joint swelling  Incision: MSI c/d/i

## 2023-08-15 NOTE — DISCHARGE NOTE NURSING/CASE MANAGEMENT/SOCIAL WORK - PATIENT PORTAL LINK FT
You can access the FollowMyHealth Patient Portal offered by [facility] by registering at the following website: [email or web address] By joining [email or web address] FollowMyHealth portal, you will also be able to view your health information using other applications (apps) compatible with our system.

## 2023-08-15 NOTE — DISCHARGE NOTE PROVIDER - NSDCFUSCHEDAPPT_GEN_ALL_CORE_FT
LIDA Quintero  [facility]  CTS[facility]G [address]  Scheduled Appointment: 08/22/2023

## 2023-08-15 NOTE — DISCHARGE NOTE PROVIDER - NSDCCPTREATMENT_GEN_ALL_CORE_FT
PRINCIPAL PROCEDURE  Procedure: CABG, with JOLENE  Findings and Treatment: CABGx3, LIMA-LAD, SVG-RCA, SVG-OM, EF 45-50%      SECONDARY PROCEDURE  Procedure: Exc left atrial appendag  Findings and Treatment:     Procedure: Isolation, vein, pulmonary  Findings and Treatment: [facility]

## 2023-08-15 NOTE — DISCHARGE NOTE NURSING/CASE MANAGEMENT/SOCIAL WORK - NSDCPEFALRISK_GEN_ALL_CORE
For information on Fall & Injury Prevention, visit: [email or web address] OR  [email or web address] OR  https://www.cdc.gov/steadi/patient.html

## 2023-08-15 NOTE — DISCHARGE NOTE PROVIDER - PROVIDER TOKENS
PROVIDER:[TOKEN:[[id number]]] PROVIDER:[TOKEN:[[id number]]],PROVIDER:[TOKEN:[[id number]]]

## 2023-08-16 ENCOUNTER — NON-APPOINTMENT (OUTPATIENT)
Age: 63
End: 2023-08-16

## 2023-08-17 ENCOUNTER — APPOINTMENT (OUTPATIENT)
Dept: CARE COORDINATION | Facility: HOME HEALTH | Age: 63
End: 2023-08-17
Payer: COMMERCIAL

## 2023-08-17 DIAGNOSIS — Z98.890 OTHER SPECIFIED POSTPROCEDURAL STATES: ICD-10-CM

## 2023-08-17 PROCEDURE — 99024 POSTOP FOLLOW-UP VISIT: CPT

## 2023-08-19 DIAGNOSIS — G47.00 INSOMNIA, UNSPECIFIED: ICD-10-CM

## 2023-08-19 DIAGNOSIS — E78.5 HYPERLIPIDEMIA, UNSPECIFIED: ICD-10-CM

## 2023-08-19 DIAGNOSIS — R26.81 UNSTEADINESS ON FEET: ICD-10-CM

## 2023-08-19 DIAGNOSIS — Z79.01 LONG TERM (CURRENT) USE OF ANTICOAGULANTS: ICD-10-CM

## 2023-08-19 DIAGNOSIS — I25.10 ATHEROSCLEROTIC HEART DISEASE OF NATIVE CORONARY ARTERY WITHOUT ANGINA PECTORIS: ICD-10-CM

## 2023-08-19 DIAGNOSIS — G89.12 ACUTE POST-THORACOTOMY PAIN: ICD-10-CM

## 2023-08-19 DIAGNOSIS — R73.9 HYPERGLYCEMIA, UNSPECIFIED: ICD-10-CM

## 2023-08-19 DIAGNOSIS — I10 ESSENTIAL (PRIMARY) HYPERTENSION: ICD-10-CM

## 2023-08-19 DIAGNOSIS — E87.20 ACIDOSIS, UNSPECIFIED: ICD-10-CM

## 2023-08-19 DIAGNOSIS — T81.82XA EMPHYSEMA (SUBCUTANEOUS) RESULTING FROM A PROCEDURE, INITIAL ENCOUNTER: ICD-10-CM

## 2023-08-19 DIAGNOSIS — J95.811 POSTPROCEDURAL PNEUMOTHORAX: ICD-10-CM

## 2023-08-19 DIAGNOSIS — I48.91 UNSPECIFIED ATRIAL FIBRILLATION: ICD-10-CM

## 2023-08-21 PROBLEM — Z98.890 S/P LEFT ATRIAL APPENDAGE LIGATION: Status: ACTIVE | Noted: 2023-08-21

## 2023-08-21 NOTE — PLAN
[TextEntry] : Post operative teaching reinforced, daily weights, showering daily, no heavy lifting greater than 5 pounds, no driving until seen by surgeon.  Obtain temperature daily. Diabetes glucose control. Medication adherence. Encouraged the use of incentive spirometry 10 times every hour.  Care Navigator contact information provided & yellow card reinforced.  Patient was encouraged to call Care Navigator with any issues, concerns, or questions.   1. Daily Shower 2. Weight yourself daily and notify any weight gain greater than 2-3 pounds in 24 hours. 3. Regular diet - low fat, low cholesterol, no added salt. 4. Cleanse midsternal & leg incision daily while showering with warm water and mild soap, pat dry and maintain open to air.  DO NOT APPLY ANY LOTION, CREAMS, OR POWDERS TO INCISIONS, KEEP OPEN TO AIR 5. No driving until cleared by MD.  6. No heavy lifting nothing greater than 5 pounds until cleared by MD.  7. Call / Notify MD any fever greater than 101.0 8. Increase Activity as tolerated. 9. Utilize heart pillow to splint pillow 10. Encouraged to arrange follow up appointment with PCP & cardiologist.

## 2023-08-21 NOTE — HISTORY OF PRESENT ILLNESS
[FreeTextEntry1] : FOLLOW YOUR HEART HOME VISIT-[facility] Telehealth Home Visit made to Mr. Fang for post discharge transitional care management and post follow up. Patient of Dr. Quintero s/p CABGx3 & WILLIAMS on 8/10/23. Discharge on 8/15/23 from [facility]  Mr. Fang ia a 63 year old male with PMHx of HTN, HLD, Atrial fibrillation (on eliquis), spinal stenosis and L4-L5 herniation s/p spinal surgery initially presented to the ED for new onset Atrial fibrillation and was admitted for ischemic workup revealing 3V CAD on cardiac cath.  Pt. s/p CABGx3, WILLIAMS exclusion and Pulmonary vein isolation with Dr. Quintero on 8/10.  Post-op coarse uncomplicated.  Eliquis was resumed, slight increase in his LFTs (AST 53/ ALT 48) while on amio. patient to continue on modified amio load, repeat LFTs as outpatient. Patient discharged on 8/10 with MCOT in place. Patient seen with wife for follow up s/p hospitalization. Denies any new complaints/issues at this time.  Medications reviewed as per d/c order with +teach back. Patient has scheduled follow up appointment. Reminded of CN role and contact number was provided to the patient.  Advised to call with any questions/concerns, verbalized understanding.

## 2023-08-21 NOTE — PHYSICAL EXAM
[Skin Color & Pigmentation] : normal skin color and pigmentation [Skin Turgor] : normal skin turgor [] : no rash [FreeTextEntry1] : Upon visualization MT & CT incisions clean dry & intact JULIO CÉSAR, MCOT in place,  RSVG incision CDI & JULIO CÉSAR, trace edema noted to RLE

## 2023-08-22 ENCOUNTER — APPOINTMENT (OUTPATIENT)
Dept: CARDIOTHORACIC SURGERY | Facility: CLINIC | Age: 63
End: 2023-08-22
Payer: COMMERCIAL

## 2023-08-22 PROCEDURE — 99024 POSTOP FOLLOW-UP VISIT: CPT

## 2023-08-24 RX ORDER — ACETAMINOPHEN 325 MG/1
325 TABLET ORAL
Qty: 240 | Refills: 0 | Status: ACTIVE | COMMUNITY
Start: 2023-08-24 | End: 1900-01-01

## 2023-08-24 RX ORDER — OXYCODONE 5 MG/1
5 TABLET ORAL EVERY 8 HOURS
Qty: 15 | Refills: 0 | Status: ACTIVE | COMMUNITY
Start: 2023-08-24 | End: 1900-01-01

## 2023-09-19 ENCOUNTER — APPOINTMENT (OUTPATIENT)
Dept: CARDIOTHORACIC SURGERY | Facility: CLINIC | Age: 63
End: 2023-09-19
Payer: COMMERCIAL

## 2023-09-19 DIAGNOSIS — Z95.1 PRESENCE OF AORTOCORONARY BYPASS GRAFT: ICD-10-CM

## 2023-09-19 PROCEDURE — 99024 POSTOP FOLLOW-UP VISIT: CPT

## 2023-09-20 PROBLEM — Z95.1 S/P CABG X 3: Status: ACTIVE | Noted: 2023-08-21

## 2024-02-29 ENCOUNTER — APPOINTMENT (OUTPATIENT)
Dept: ORTHOPEDIC SURGERY | Facility: CLINIC | Age: 64
End: 2024-02-29
Payer: COMMERCIAL

## 2024-02-29 VITALS
DIASTOLIC BLOOD PRESSURE: 91 MMHG | OXYGEN SATURATION: 98 % | BODY MASS INDEX: 29.8 KG/M2 | WEIGHT: 220 LBS | SYSTOLIC BLOOD PRESSURE: 146 MMHG | HEART RATE: 83 BPM | HEIGHT: 72 IN

## 2024-02-29 PROCEDURE — 72082 X-RAY EXAM ENTIRE SPI 2/3 VW: CPT

## 2024-02-29 PROCEDURE — 99204 OFFICE O/P NEW MOD 45 MIN: CPT

## 2024-03-14 ENCOUNTER — TRANSCRIPTION ENCOUNTER (OUTPATIENT)
Age: 64
End: 2024-03-14

## 2024-03-18 NOTE — PHYSICAL EXAM
[de-identified] : NVI, except L EHL 3/5, L tib ant 4-/5, quad 4-/5, IP 4/5. [de-identified] : MRI T/L spine 2/14/2024 (c/s; p):  severe spinal stenosis T10-T11, L2-3, L3-4, L4-5  Scoliosis xray 2/29/2024:  loss of lumbar lordosis, sva less than 10 degrees

## 2024-03-18 NOTE — DISCUSSION/SUMMARY
[Surgical risks reviewed] : Surgical risks reviewed [de-identified] : A lengthy discussion was held with the patient regarding his condition.  We discussed nonoperative treatment strategies including physical therapy, pharmacologic management and steroid injections.  We discussed surgical options (Posterior Spinal Fusion with Instrumentation T9-Pelvis, Huertas Mercado Osteotomies all Levels, Decompression (T10-T11, T12-L1, L1-L2, L2-L3, L3-L4), TLIF L3-4 & L5-S1, Iliac Fixation, Tethers) and the attendant benefits, alternatives, and risks, including but not limited to infection, bleeding, persistent pain, persistent neurologic deficit, neurologic injury, adjacent segment degeneration, and the need for future surgery.  The patient's questions were sought and answered satisfactorily. He will obtain cardiac and medical clearance.     IOndina NP am acting as a scribe for Dr. Frank Schwab.

## 2024-03-18 NOTE — HISTORY OF PRESENT ILLNESS
[de-identified] : Pt presents with c/o leg weakness.  Had lumbar laminectomy in 2003 and T10-L3 lami by Dr Anne in 2015.  Complains of 6/10 persistent low back pain aggravated by walking and bending. Saw Dr Anne 1 year ago, did MRI- spinal stenosis- referred to pain mgmt.  Has sx scheduled in summer but cancelled d/t a fib.  Has triple bypass sx.  He was in cardiac rehab x 6 weeks.  Bikes now, cannot walk.  Has been in PT for back after cardiac rehab.  Needs walker or cane.  After couple of steps, his legs give out.     Takes oxycodone 5mg prn and tylenol (has pain mgmt MD).

## 2024-03-21 ENCOUNTER — APPOINTMENT (OUTPATIENT)
Dept: PAIN MANAGEMENT | Facility: CLINIC | Age: 64
End: 2024-03-21
Payer: COMMERCIAL

## 2024-03-21 DIAGNOSIS — M54.50 LOW BACK PAIN, UNSPECIFIED: ICD-10-CM

## 2024-03-21 DIAGNOSIS — M48.061 SPINAL STENOSIS, LUMBAR REGION WITHOUT NEUROGENIC CLAUDICATION: ICD-10-CM

## 2024-03-21 DIAGNOSIS — G89.29 LOW BACK PAIN, UNSPECIFIED: ICD-10-CM

## 2024-03-21 PROCEDURE — 99203 OFFICE O/P NEW LOW 30 MIN: CPT | Mod: 95

## 2024-03-21 NOTE — HISTORY OF PRESENT ILLNESS
[Home] : at home, [unfilled] , at the time of the visit. [FreeTextEntry1] : 63 y/o male presents for pre operative pain medicine consult  for Posterior Spinal Fusion with Instrumentation T9-Pelvis, Huertas Mercado Osteotomies all Levels, Decompression (T10-T11, T12-L1, L1-L2, L2-L3, L3-L4), TLIF L3-4 & L5-S1, Iliac Fixation, Tethers with Dr. Schwab.  Currently taking pain medicine: Oxycodone 5 mg ( 2/day), Tylenol prn,   He is currently using a walker.    Denies reactions to pain medications including opiods, muscle relaxants, neuropathyic medications (Gabapentin).  Pain is located int he lower back and in the left leg (posterior aspect and also involving the knee).  At baseline, bowel movements are every other day. [Verbal consent obtained from patient] : the patient, [unfilled] [Medical Office: ([facility])___] : at the medical office located in

## 2024-03-21 NOTE — ASSESSMENT
[FreeTextEntry1] : The following plan was discussed with the patient: PCA Hydromorphone 0.2 mg q6min Robaxin 500 mg q8h Tylenol 1000 mg Po q8h Gabapentin 300 mg q8h   Then when tolerating PO Oxycodone 5-10 q4h prn  Moves bowel at baseline every other day, may need Senna TID, Dulcolax or even Movantik 12.5 qdaily (depending on narcotic usage).  All questions were answered to his satisfaction.

## 2024-03-22 VITALS
TEMPERATURE: 99 F | RESPIRATION RATE: 16 BRPM | WEIGHT: 220.02 LBS | HEIGHT: 73 IN | DIASTOLIC BLOOD PRESSURE: 94 MMHG | HEART RATE: 89 BPM | SYSTOLIC BLOOD PRESSURE: 144 MMHG | OXYGEN SATURATION: 98 %

## 2024-03-22 RX ORDER — LOSARTAN POTASSIUM 100 MG/1
1 TABLET, FILM COATED ORAL
Refills: 0 | DISCHARGE

## 2024-03-22 RX ORDER — POVIDONE-IODINE 5 %
1 AEROSOL (ML) TOPICAL ONCE
Refills: 0 | Status: COMPLETED | OUTPATIENT
Start: 2024-03-25 | End: 2024-03-25

## 2024-03-22 RX ORDER — INFLUENZA VIRUS VACCINE 15; 15; 15; 15 UG/.5ML; UG/.5ML; UG/.5ML; UG/.5ML
0.5 SUSPENSION INTRAMUSCULAR ONCE
Refills: 0 | Status: DISCONTINUED | OUTPATIENT
Start: 2024-03-25 | End: 2024-03-29

## 2024-03-22 NOTE — H&P ADULT - HISTORY OF PRESENT ILLNESS
64yoM with back pain x     Has had 3 previous lumbar spine surgeries     On eliquis last dose:     Presents today for elective posterior spinal fusion with instrumentation T9-Pelvis, lemos wing osteotomies, decompression T10-T11, T12-L1, L1-L4 and L5-S1, iliac fixation, tethers with Dr. Schwab The patient is a 64 year old male who Presents today for elective posterior spinal fusion with instrumentation T9-Pelvis, lemos wing osteotomies, decompression T10-T11, T12-L1, L1-L4 and L5-S1, iliac fixation, tethers with Dr. Schwab.Of note has had 2 previous lumbar spine surgeries (per chart review lumbar laminectomy in 2003 and T10-L3 lami by Dr Anne in 2015). Has chronic pain but pain/symptoms rapidly worsened in february 2024. Ambulates with assistance of a walker. Takes oxycodone for his pain. Of note has history of afib on eliquis, and per chart review history of triple bypass surgery for which he was in cardiac rehab for 6 weeks.

## 2024-03-22 NOTE — H&P ADULT - NSHPPHYSICALEXAM_GEN_ALL_CORE
MSK: decreased thoracic and lumbar spine ROM secondary to pain    Remainder of physical exam per medical clearance note Per Dr .Schwab's outpatient note: MSK NVI, except L EHL 3/5, L tib ant 4-/5, quad 4-/5, IP 4/5.   Remainder of physical exam as per medical clearance note

## 2024-03-22 NOTE — H&P ADULT - NSICDXPASTMEDICALHX_GEN_ALL_CORE_FT
PAST MEDICAL HISTORY:  Atrial fibrillation     CAD (coronary artery disease)     Flatback syndrome     HTN (hypertension)

## 2024-03-22 NOTE — H&P ADULT - NSHPLABSRESULTS_GEN_ALL_CORE
Preop CBC, BMP within normal range and reviewed per medical clearance  PT/INR, PTT elevated will repeat DOS  H/H: 14.2/43  Cr: 0.7  UA: within normal limits and reviewed per medical clearance  Preop CXR within normal limits and reviewed per medical clearance   Preop EKG NSR, nonspecific ST depression,  and reviewed per medical clearance  TTE 9/2023: LVEF 45-50%  3M: DOS  T + S: DOS Preop CBC, BMP within normal range and reviewed per medical clearance  PT/INR, PTT elevated will repeat DOS  H/H: 14.2/43  Cr: 0.7  UA: within normal limits and reviewed per medical clearance  Preop CXR within normal limits and reviewed per medical clearance   Preop EKG NSR, nonspecific ST depression,  and reviewed per medical clearance  TTE 9/2023: LVEF 45-50%  Povidone iodine nasal swab to be given day of surgery   T + S: DOS

## 2024-03-22 NOTE — PATIENT PROFILE ADULT - FALL HARM RISK - HARM RISK INTERVENTIONS

## 2024-03-22 NOTE — H&P ADULT - NSICDXPASTSURGICALHX_GEN_ALL_CORE_FT
PAST SURGICAL HISTORY:  History of lumbar surgery stenosis    Lumbar herniated disc     S/P CABG x 3 8/23

## 2024-03-24 ENCOUNTER — TRANSCRIPTION ENCOUNTER (OUTPATIENT)
Age: 64
End: 2024-03-24

## 2024-03-25 ENCOUNTER — INPATIENT (INPATIENT)
Facility: HOSPITAL | Age: 64
LOS: 3 days | Discharge: NURSING, REHAB OR LONG-TERM CARE | DRG: 454 | End: 2024-03-29
Attending: ORTHOPAEDIC SURGERY | Admitting: ORTHOPAEDIC SURGERY
Payer: COMMERCIAL

## 2024-03-25 ENCOUNTER — APPOINTMENT (OUTPATIENT)
Dept: ORTHOPEDIC SURGERY | Facility: HOSPITAL | Age: 64
End: 2024-03-25
Payer: COMMERCIAL

## 2024-03-25 DIAGNOSIS — Z98.890 OTHER SPECIFIED POSTPROCEDURAL STATES: Chronic | ICD-10-CM

## 2024-03-25 DIAGNOSIS — M40.30 FLATBACK SYNDROME, SITE UNSPECIFIED: ICD-10-CM

## 2024-03-25 DIAGNOSIS — I48.91 UNSPECIFIED ATRIAL FIBRILLATION: ICD-10-CM

## 2024-03-25 DIAGNOSIS — I10 ESSENTIAL (PRIMARY) HYPERTENSION: ICD-10-CM

## 2024-03-25 DIAGNOSIS — Z95.1 PRESENCE OF AORTOCORONARY BYPASS GRAFT: Chronic | ICD-10-CM

## 2024-03-25 DIAGNOSIS — M51.26 OTHER INTERVERTEBRAL DISC DISPLACEMENT, LUMBAR REGION: Chronic | ICD-10-CM

## 2024-03-25 DIAGNOSIS — I25.10 ATHEROSCLEROTIC HEART DISEASE OF NATIVE CORONARY ARTERY WITHOUT ANGINA PECTORIS: ICD-10-CM

## 2024-03-25 LAB
APTT BLD: 30.6 SEC — SIGNIFICANT CHANGE UP (ref 24.5–35.6)
BLD GP AB SCN SERPL QL: NEGATIVE — SIGNIFICANT CHANGE UP
HCT VFR BLD CALC: 29.2 % — LOW (ref 39–50)
HGB BLD-MCNC: 9.9 G/DL — LOW (ref 13–17)
INR BLD: 1.22 — HIGH (ref 0.85–1.18)
MCHC RBC-ENTMCNC: 30.5 PG — SIGNIFICANT CHANGE UP (ref 27–34)
MCHC RBC-ENTMCNC: 33.9 GM/DL — SIGNIFICANT CHANGE UP (ref 32–36)
MCV RBC AUTO: 89.8 FL — SIGNIFICANT CHANGE UP (ref 80–100)
NRBC # BLD: 0 /100 WBCS — SIGNIFICANT CHANGE UP (ref 0–0)
PLATELET # BLD AUTO: 150 K/UL — SIGNIFICANT CHANGE UP (ref 150–400)
PROTHROM AB SERPL-ACNC: 13.8 SEC — HIGH (ref 9.5–13)
RBC # BLD: 3.25 M/UL — LOW (ref 4.2–5.8)
RBC # FLD: 13.2 % — SIGNIFICANT CHANGE UP (ref 10.3–14.5)
RH IG SCN BLD-IMP: POSITIVE — SIGNIFICANT CHANGE UP
WBC # BLD: 10.49 K/UL — SIGNIFICANT CHANGE UP (ref 3.8–10.5)
WBC # FLD AUTO: 10.49 K/UL — SIGNIFICANT CHANGE UP (ref 3.8–10.5)

## 2024-03-25 PROCEDURE — 22848 INSERT PELV FIXATION DEVICE: CPT | Mod: 80

## 2024-03-25 PROCEDURE — 22212 INCIS 1 VERTEBRAL SEG THORAC: CPT | Mod: 80

## 2024-03-25 PROCEDURE — 22216 INCIS ADDL SPINE SEGMENT: CPT | Mod: 80

## 2024-03-25 PROCEDURE — 22633 ARTHRD CMBN 1NTRSPC LUMBAR: CPT

## 2024-03-25 PROCEDURE — 63052 LAM FACETC/FRMT ARTHRD LUM 1: CPT | Mod: 80,59

## 2024-03-25 PROCEDURE — 72020 X-RAY EXAM OF SPINE 1 VIEW: CPT | Mod: 26

## 2024-03-25 PROCEDURE — 22853 INSJ BIOMECHANICAL DEVICE: CPT

## 2024-03-25 PROCEDURE — 22614 ARTHRD PST TQ 1NTRSPC EA ADD: CPT

## 2024-03-25 PROCEDURE — 22899 UNLISTED PROCEDURE SPINE: CPT | Mod: 80

## 2024-03-25 PROCEDURE — 22633 ARTHRD CMBN 1NTRSPC LUMBAR: CPT | Mod: 80

## 2024-03-25 PROCEDURE — 22614 ARTHRD PST TQ 1NTRSPC EA ADD: CPT | Mod: 80

## 2024-03-25 PROCEDURE — 22216 INCIS ADDL SPINE SEGMENT: CPT

## 2024-03-25 PROCEDURE — 22853 INSJ BIOMECHANICAL DEVICE: CPT | Mod: 80

## 2024-03-25 PROCEDURE — 22212 INCIS 1 VERTEBRAL SEG THORAC: CPT

## 2024-03-25 PROCEDURE — 22843 INSERT SPINE FIXATION DEVICE: CPT | Mod: 80

## 2024-03-25 PROCEDURE — 63052 LAM FACETC/FRMT ARTHRD LUM 1: CPT | Mod: 59

## 2024-03-25 PROCEDURE — 22843 INSERT SPINE FIXATION DEVICE: CPT

## 2024-03-25 PROCEDURE — 22848 INSERT PELV FIXATION DEVICE: CPT

## 2024-03-25 PROCEDURE — 22899 UNLISTED PROCEDURE SPINE: CPT

## 2024-03-25 DEVICE — SCREW VITAL 6.5X45MM: Type: IMPLANTABLE DEVICE | Status: FUNCTIONAL

## 2024-03-25 DEVICE — SCREW 5.5X40MM: Type: IMPLANTABLE DEVICE | Status: FUNCTIONAL

## 2024-03-25 DEVICE — PLATE TIGHTROPE XP BUTTRESS: Type: IMPLANTABLE DEVICE | Status: FUNCTIONAL

## 2024-03-25 DEVICE — IMPLANTABLE DEVICE: Type: IMPLANTABLE DEVICE | Status: FUNCTIONAL

## 2024-03-25 DEVICE — SURGIFLO HEMOSTATIC MATRIX KIT: Type: IMPLANTABLE DEVICE | Status: FUNCTIONAL

## 2024-03-25 DEVICE — SCREW VITAL 5.5X45MM: Type: IMPLANTABLE DEVICE | Status: FUNCTIONAL

## 2024-03-25 DEVICE — GRAFT BONE INFUSE KIT LG: Type: IMPLANTABLE DEVICE | Status: FUNCTIONAL

## 2024-03-25 DEVICE — SCREW VITAL 6.5X50MM: Type: IMPLANTABLE DEVICE | Status: FUNCTIONAL

## 2024-03-25 DEVICE — CLOSURE TOP STD 5.5-6.0: Type: IMPLANTABLE DEVICE | Status: FUNCTIONAL

## 2024-03-25 DEVICE — SURGIFOAM PAD 8CM X 12.5CM X 10MM (100): Type: IMPLANTABLE DEVICE | Status: FUNCTIONAL

## 2024-03-25 DEVICE — ROD COCR STR VITAL 5.5X510MM LRG: Type: IMPLANTABLE DEVICE | Status: FUNCTIONAL

## 2024-03-25 RX ORDER — POLYETHYLENE GLYCOL 3350 17 G/17G
17 POWDER, FOR SOLUTION ORAL DAILY
Refills: 0 | Status: DISCONTINUED | OUTPATIENT
Start: 2024-03-25 | End: 2024-03-29

## 2024-03-25 RX ORDER — APREPITANT 80 MG/1
40 CAPSULE ORAL ONCE
Refills: 0 | Status: COMPLETED | OUTPATIENT
Start: 2024-03-25 | End: 2024-03-25

## 2024-03-25 RX ORDER — METHOCARBAMOL 500 MG/1
500 TABLET, FILM COATED ORAL EVERY 8 HOURS
Refills: 0 | Status: DISCONTINUED | OUTPATIENT
Start: 2024-03-25 | End: 2024-03-27

## 2024-03-25 RX ORDER — ATORVASTATIN CALCIUM 80 MG/1
40 TABLET, FILM COATED ORAL AT BEDTIME
Refills: 0 | Status: DISCONTINUED | OUTPATIENT
Start: 2024-03-25 | End: 2024-03-29

## 2024-03-25 RX ORDER — SODIUM CHLORIDE 9 MG/ML
1000 INJECTION, SOLUTION INTRAVENOUS
Refills: 0 | Status: DISCONTINUED | OUTPATIENT
Start: 2024-03-25 | End: 2024-03-29

## 2024-03-25 RX ORDER — APIXABAN 2.5 MG/1
5 TABLET, FILM COATED ORAL
Refills: 0 | Status: DISCONTINUED | OUTPATIENT
Start: 2024-03-26 | End: 2024-03-29

## 2024-03-25 RX ORDER — HYDROMORPHONE HYDROCHLORIDE 2 MG/ML
1 INJECTION INTRAMUSCULAR; INTRAVENOUS; SUBCUTANEOUS EVERY 4 HOURS
Refills: 0 | Status: DISCONTINUED | OUTPATIENT
Start: 2024-03-25 | End: 2024-03-25

## 2024-03-25 RX ORDER — ONDANSETRON 8 MG/1
4 TABLET, FILM COATED ORAL EVERY 6 HOURS
Refills: 0 | Status: DISCONTINUED | OUTPATIENT
Start: 2024-03-25 | End: 2024-03-29

## 2024-03-25 RX ORDER — CEFAZOLIN SODIUM 1 G
2000 VIAL (EA) INJECTION EVERY 8 HOURS
Refills: 0 | Status: COMPLETED | OUTPATIENT
Start: 2024-03-25 | End: 2024-03-26

## 2024-03-25 RX ORDER — HYDROMORPHONE HYDROCHLORIDE 2 MG/ML
0.5 INJECTION INTRAMUSCULAR; INTRAVENOUS; SUBCUTANEOUS
Refills: 0 | Status: DISCONTINUED | OUTPATIENT
Start: 2024-03-25 | End: 2024-03-25

## 2024-03-25 RX ORDER — ACETAMINOPHEN 500 MG
1000 TABLET ORAL ONCE
Refills: 0 | Status: COMPLETED | OUTPATIENT
Start: 2024-03-25 | End: 2024-03-25

## 2024-03-25 RX ORDER — METOPROLOL TARTRATE 50 MG
25 TABLET ORAL DAILY
Refills: 0 | Status: DISCONTINUED | OUTPATIENT
Start: 2024-03-25 | End: 2024-03-29

## 2024-03-25 RX ORDER — CHLORHEXIDINE GLUCONATE 213 G/1000ML
1 SOLUTION TOPICAL ONCE
Refills: 0 | Status: COMPLETED | OUTPATIENT
Start: 2024-03-25 | End: 2024-03-25

## 2024-03-25 RX ORDER — HYDROMORPHONE HYDROCHLORIDE 2 MG/ML
30 INJECTION INTRAMUSCULAR; INTRAVENOUS; SUBCUTANEOUS
Refills: 0 | Status: DISCONTINUED | OUTPATIENT
Start: 2024-03-25 | End: 2024-03-26

## 2024-03-25 RX ORDER — SENNA PLUS 8.6 MG/1
2 TABLET ORAL AT BEDTIME
Refills: 0 | Status: DISCONTINUED | OUTPATIENT
Start: 2024-03-25 | End: 2024-03-29

## 2024-03-25 RX ORDER — PANTOPRAZOLE SODIUM 20 MG/1
40 TABLET, DELAYED RELEASE ORAL
Refills: 0 | Status: DISCONTINUED | OUTPATIENT
Start: 2024-03-25 | End: 2024-03-29

## 2024-03-25 RX ORDER — HYDROMORPHONE HYDROCHLORIDE 2 MG/ML
0.5 INJECTION INTRAMUSCULAR; INTRAVENOUS; SUBCUTANEOUS EVERY 4 HOURS
Refills: 0 | Status: DISCONTINUED | OUTPATIENT
Start: 2024-03-25 | End: 2024-03-26

## 2024-03-25 RX ORDER — OXYCODONE HYDROCHLORIDE 5 MG/1
10 TABLET ORAL EVERY 4 HOURS
Refills: 0 | Status: DISCONTINUED | OUTPATIENT
Start: 2024-03-25 | End: 2024-03-29

## 2024-03-25 RX ORDER — GABAPENTIN 400 MG/1
1 CAPSULE ORAL
Refills: 0 | DISCHARGE

## 2024-03-25 RX ORDER — ACETAMINOPHEN 500 MG
1000 TABLET ORAL EVERY 8 HOURS
Refills: 0 | Status: DISCONTINUED | OUTPATIENT
Start: 2024-03-25 | End: 2024-03-29

## 2024-03-25 RX ORDER — OXYCODONE HYDROCHLORIDE 5 MG/1
5 TABLET ORAL EVERY 4 HOURS
Refills: 0 | Status: DISCONTINUED | OUTPATIENT
Start: 2024-03-25 | End: 2024-03-29

## 2024-03-25 RX ADMIN — Medication 1000 MILLIGRAM(S): at 21:37

## 2024-03-25 RX ADMIN — HYDROMORPHONE HYDROCHLORIDE 0.5 MILLIGRAM(S): 2 INJECTION INTRAMUSCULAR; INTRAVENOUS; SUBCUTANEOUS at 16:26

## 2024-03-25 RX ADMIN — Medication 1 APPLICATION(S): at 07:25

## 2024-03-25 RX ADMIN — Medication 50 MILLIGRAM(S): at 19:06

## 2024-03-25 RX ADMIN — Medication 1000 MILLIGRAM(S): at 16:15

## 2024-03-25 RX ADMIN — CHLORHEXIDINE GLUCONATE 1 APPLICATION(S): 213 SOLUTION TOPICAL at 07:25

## 2024-03-25 RX ADMIN — HYDROMORPHONE HYDROCHLORIDE 0.5 MILLIGRAM(S): 2 INJECTION INTRAMUSCULAR; INTRAVENOUS; SUBCUTANEOUS at 16:04

## 2024-03-25 RX ADMIN — APREPITANT 40 MILLIGRAM(S): 80 CAPSULE ORAL at 06:45

## 2024-03-25 RX ADMIN — METHOCARBAMOL 500 MILLIGRAM(S): 500 TABLET, FILM COATED ORAL at 16:14

## 2024-03-25 RX ADMIN — ONDANSETRON 4 MILLIGRAM(S): 8 TABLET, FILM COATED ORAL at 19:06

## 2024-03-25 RX ADMIN — POLYETHYLENE GLYCOL 3350 17 GRAM(S): 17 POWDER, FOR SOLUTION ORAL at 19:05

## 2024-03-25 RX ADMIN — Medication 1000 MILLIGRAM(S): at 06:44

## 2024-03-25 RX ADMIN — SENNA PLUS 2 TABLET(S): 8.6 TABLET ORAL at 21:37

## 2024-03-25 RX ADMIN — Medication 100 MILLIGRAM(S): at 20:00

## 2024-03-25 RX ADMIN — HYDROMORPHONE HYDROCHLORIDE 30 MILLILITER(S): 2 INJECTION INTRAMUSCULAR; INTRAVENOUS; SUBCUTANEOUS at 20:35

## 2024-03-25 RX ADMIN — HYDROMORPHONE HYDROCHLORIDE 0.5 MILLIGRAM(S): 2 INJECTION INTRAMUSCULAR; INTRAVENOUS; SUBCUTANEOUS at 15:24

## 2024-03-25 RX ADMIN — METHOCARBAMOL 500 MILLIGRAM(S): 500 TABLET, FILM COATED ORAL at 23:43

## 2024-03-25 RX ADMIN — HYDROMORPHONE HYDROCHLORIDE 0.5 MILLIGRAM(S): 2 INJECTION INTRAMUSCULAR; INTRAVENOUS; SUBCUTANEOUS at 16:31

## 2024-03-25 RX ADMIN — ATORVASTATIN CALCIUM 40 MILLIGRAM(S): 80 TABLET, FILM COATED ORAL at 21:37

## 2024-03-25 RX ADMIN — Medication 1000 MILLIGRAM(S): at 22:37

## 2024-03-25 RX ADMIN — HYDROMORPHONE HYDROCHLORIDE 30 MILLILITER(S): 2 INJECTION INTRAMUSCULAR; INTRAVENOUS; SUBCUTANEOUS at 19:54

## 2024-03-25 NOTE — BRIEF OPERATIVE NOTE - NSICDXBRIEFPOSTOP_GEN_ALL_CORE_FT
POST-OP DIAGNOSIS:  Spinal stenosis 25-Mar-2024 14:13:41  Mahogany Fontenot  Flatback syndrome 25-Mar-2024 14:13:59  Mahogany Fontenot

## 2024-03-25 NOTE — PROGRESS NOTE ADULT - SUBJECTIVE AND OBJECTIVE BOX
Ortho Post Op Check    Procedure: T9-Pelvis Fusion, osteotomies, tethers  Surgeon: Dr. Schwab/Dr. Gant    Patient seen and examined in PACU bed. Patient endorses proximal incision pain.   Denies CP, SOB, N/V, numbness/tingling     Vital Signs Last 24 Hrs  T(C): 36.7 (03-25-24 @ 13:49), Max: 36.7 (03-25-24 @ 13:49)  T(F): 98.1 (03-25-24 @ 13:49), Max: 98.1 (03-25-24 @ 13:49)  HR: 68 (03-25-24 @ 14:34) (68 - 74)  BP: 97/58 (03-25-24 @ 14:34) (97/56 - 103/65)  BP(mean): 72 (03-25-24 @ 14:34) (71 - 80)  RR: 15 (03-25-24 @ 14:34) (14 - 20)  SpO2: 97% (03-25-24 @ 14:34) (96% - 99%)  I&O's Summary    25 Mar 2024 07:01  -  25 Mar 2024 16:25  --------------------------------------------------------  IN: 0 mL / OUT: 235 mL / NET: -235 mL        General: Pt Alert and oriented, NAD  DSG C/D/I - Prineo, gauze/teg over drain site. JPx1 in place  Pulses: 2+ DP pulses palpable bilaterally  Sensation: SILT to L2-S1 dermatomes bilaterally   Motor: Right lower extremity GS 2/5, TA 5/5, EHL/FHL 4/5; Left lower extremity GS/TA 5/5, EHL 3/5, FHL 4/5                          9.9    10.49 )-----------( 150      ( 25 Mar 2024 16:01 )             29.2                 A/P: 63yo Male POD#0 s/p T9-Pelvis fusion, lemos wing osteotomies, tethers  - Stable  - Pain Control  - JPx1, monitor output  - DVT ppx: Eliquis 5mg BID POD#1  - Post op abx: Ancef  - PT, WBS: WBAT    Ortho Pager [phone]

## 2024-03-25 NOTE — PHYSICAL THERAPY INITIAL EVALUATION ADULT - PERTINENT HX OF CURRENT PROBLEM, REHAB EVAL
64 year old male who Presents today for elective posterior spinal fusion with instrumentation T9-Pelvis, lemos wing osteotomies, decompression T10-T11, T12-L1, L1-L4 and L5-S1, iliac fixation, tethers with Dr. Schwab.Of note has had 2 previous lumbar spine surgeries (per chart review lumbar laminectomy in 2003 and T10-L3 lami by Dr Anne in 2015). Has chronic pain but pain/symptoms rapidly worsened in february 2024. Ambulates with assistance of a walker. Takes oxycodone for his pain. Of note has history of afib on eliquis, and per chart review history of triple bypass surgery for which he was in cardiac rehab for 6 weeks.

## 2024-03-25 NOTE — PHYSICAL THERAPY INITIAL EVALUATION ADULT - GAIT DEVIATIONS NOTED, PT EVAL
Pt w/ dec weight shifting abilities/slightly unsteady gait however no LOB/falls or knee buckling observed. Required assist w/ RW management for goal of side steps./decreased teresa/decreased velocity of limb motion/decreased weight-shifting ability

## 2024-03-25 NOTE — BRIEF OPERATIVE NOTE - NSICDXBRIEFPREOP_GEN_ALL_CORE_FT
PRE-OP DIAGNOSIS:  Spinal stenosis 25-Mar-2024 14:13:33  Mahogany Fontenot  Flatback syndrome 25-Mar-2024 14:13:50  Mahogany Fontenot

## 2024-03-25 NOTE — BRIEF OPERATIVE NOTE - NSICDXBRIEFPROCEDURE_GEN_ALL_CORE_FT
Patient verified name and . Order for consent found in EHR and matches case posting; patient verified. Type 3 surgery, joint assessment complete. Labs per surgeon: CBC,BMP, PT/PTT, HgbA1c; results pending. T&S DOS and POC glucose DOS; orders signed and held in EHR. Labs per anesthesia protocol: no additional  EKG: Completed today; results to be reviewed by anesthesia. Charge nurse to f/u. [facility] surgery scheduler, Blake Greco, made aware that patient has a pacemaker. VM left with Amalia lacey, informing that patient is scheduled for left TKA on 21 at [facility]; time of arrival to be determined by pre-op the day before surgery. Phone # [phone]. Copy of pacemaker/defibrillator card, Cardiology note (21), Pacemaker Interrogation (10/18/21), Stress (21), and  Echo (10/14/19) located in EHR if needed. Pt dx with COVID-19 2021, Per most recent cardiology note( 21) \"COVID infection with primarily CNS involvement. Most has resolved but continues with some long term memory issues and newly altered handwriting since then, asks his family to write checks for him it is so bad. Hopeful for some recovery with time and distance from the active infection       Gratefully, cardiac status was stable but I recommend we repeat his echo prior to his 6 months follow up to evaluate for subclinical effects on EF\" Pt states he cx his echo appointment at the end of 2021 in anticipation of TKA on 21. Anesthesia to review the above and determine if Echo is needed prior to sx. Charge nurse to f/u.        Patient COVID test date 21 at 1105; Patient aware. The testing center info [facility] [address] [place]  and telephone number [phone] provided to the patient if not appointment found. MRSA/MSSA swab collected; pharmacy to review and dose antibiotic as appropriate.      Hospital approved surgical skin cleanser and instructions to return bottle on DOS given per hospital policy. Patient provided with handouts including Guide to Surgery, Pain Management, Hand Hygiene, Blood Transfusion Education, and [facility] Brochure. Patient answered medical/surgical history questions at their best of ability. All prior to admission medications documented in [facility]. Original medication prescription bottle NOT visualized during patient appointment. Patient instructed to hold all vitamins, supplements, herbals 3 weeks prior to surgery and NSAIDS 5 days prior to surgery. Patient teach back successful and patient demonstrates knowledge of instruction. PROCEDURES:  Fusion of thoracolumbar spine by posterior approach 25-Mar-2024 14:11:56  Mahogany Fontenot

## 2024-03-25 NOTE — PHYSICAL THERAPY INITIAL EVALUATION ADULT - ADDITIONAL COMMENTS
Pt currently resides in 2 story ranch style home w/ wife, 1 JERRY. Primarily amb w/ RW as of recently. Experienced 1 fall within past 6 months. Indep w/ showering and dressing tasks. Pt also owns transport chair, shower chair, grab bars and raised toilet seat. Pt is Right hand dominant.

## 2024-03-26 LAB
ANION GAP SERPL CALC-SCNC: 12 MMOL/L — SIGNIFICANT CHANGE UP (ref 5–17)
BASOPHILS # BLD AUTO: 0.01 K/UL — SIGNIFICANT CHANGE UP (ref 0–0.2)
BASOPHILS NFR BLD AUTO: 0.1 % — SIGNIFICANT CHANGE UP (ref 0–2)
BUN SERPL-MCNC: 19 MG/DL — SIGNIFICANT CHANGE UP (ref 7–23)
CALCIUM SERPL-MCNC: 8.7 MG/DL — SIGNIFICANT CHANGE UP (ref 8.4–10.5)
CHLORIDE SERPL-SCNC: 109 MMOL/L — HIGH (ref 96–108)
CO2 SERPL-SCNC: 22 MMOL/L — SIGNIFICANT CHANGE UP (ref 22–31)
CREAT SERPL-MCNC: 0.86 MG/DL — SIGNIFICANT CHANGE UP (ref 0.5–1.3)
EGFR: 97 ML/MIN/1.73M2 — SIGNIFICANT CHANGE UP
EOSINOPHIL # BLD AUTO: 0 K/UL — SIGNIFICANT CHANGE UP (ref 0–0.5)
EOSINOPHIL NFR BLD AUTO: 0 % — SIGNIFICANT CHANGE UP (ref 0–6)
GLUCOSE SERPL-MCNC: 144 MG/DL — HIGH (ref 70–99)
HCT VFR BLD CALC: 24.1 % — LOW (ref 39–50)
HGB BLD-MCNC: 7.9 G/DL — LOW (ref 13–17)
IMM GRANULOCYTES NFR BLD AUTO: 0.7 % — SIGNIFICANT CHANGE UP (ref 0–0.9)
LYMPHOCYTES # BLD AUTO: 0.58 K/UL — LOW (ref 1–3.3)
LYMPHOCYTES # BLD AUTO: 5 % — LOW (ref 13–44)
MCHC RBC-ENTMCNC: 30 PG — SIGNIFICANT CHANGE UP (ref 27–34)
MCHC RBC-ENTMCNC: 32.8 GM/DL — SIGNIFICANT CHANGE UP (ref 32–36)
MCV RBC AUTO: 91.6 FL — SIGNIFICANT CHANGE UP (ref 80–100)
MONOCYTES # BLD AUTO: 0.79 K/UL — SIGNIFICANT CHANGE UP (ref 0–0.9)
MONOCYTES NFR BLD AUTO: 6.8 % — SIGNIFICANT CHANGE UP (ref 2–14)
NEUTROPHILS # BLD AUTO: 10.14 K/UL — HIGH (ref 1.8–7.4)
NEUTROPHILS NFR BLD AUTO: 87.4 % — HIGH (ref 43–77)
NRBC # BLD: 0 /100 WBCS — SIGNIFICANT CHANGE UP (ref 0–0)
PLATELET # BLD AUTO: 139 K/UL — LOW (ref 150–400)
POTASSIUM SERPL-MCNC: 4.5 MMOL/L — SIGNIFICANT CHANGE UP (ref 3.5–5.3)
POTASSIUM SERPL-SCNC: 4.5 MMOL/L — SIGNIFICANT CHANGE UP (ref 3.5–5.3)
RBC # BLD: 2.63 M/UL — LOW (ref 4.2–5.8)
RBC # FLD: 13.5 % — SIGNIFICANT CHANGE UP (ref 10.3–14.5)
SODIUM SERPL-SCNC: 143 MMOL/L — SIGNIFICANT CHANGE UP (ref 135–145)
WBC # BLD: 11.6 K/UL — HIGH (ref 3.8–10.5)
WBC # FLD AUTO: 11.6 K/UL — HIGH (ref 3.8–10.5)

## 2024-03-26 PROCEDURE — 99223 1ST HOSP IP/OBS HIGH 75: CPT

## 2024-03-26 RX ORDER — ASPIRIN/CALCIUM CARB/MAGNESIUM 324 MG
81 TABLET ORAL DAILY
Refills: 0 | Status: DISCONTINUED | OUTPATIENT
Start: 2024-03-26 | End: 2024-03-29

## 2024-03-26 RX ORDER — SODIUM CHLORIDE 9 MG/ML
500 INJECTION INTRAMUSCULAR; INTRAVENOUS; SUBCUTANEOUS ONCE
Refills: 0 | Status: COMPLETED | OUTPATIENT
Start: 2024-03-26 | End: 2024-03-26

## 2024-03-26 RX ADMIN — PANTOPRAZOLE SODIUM 40 MILLIGRAM(S): 20 TABLET, DELAYED RELEASE ORAL at 05:30

## 2024-03-26 RX ADMIN — OXYCODONE HYDROCHLORIDE 10 MILLIGRAM(S): 5 TABLET ORAL at 22:35

## 2024-03-26 RX ADMIN — Medication 1000 MILLIGRAM(S): at 14:54

## 2024-03-26 RX ADMIN — Medication 100 MILLIGRAM(S): at 04:00

## 2024-03-26 RX ADMIN — METHOCARBAMOL 500 MILLIGRAM(S): 500 TABLET, FILM COATED ORAL at 05:29

## 2024-03-26 RX ADMIN — Medication 25 MILLIGRAM(S): at 05:30

## 2024-03-26 RX ADMIN — Medication 1000 MILLIGRAM(S): at 22:35

## 2024-03-26 RX ADMIN — Medication 1000 MILLIGRAM(S): at 06:30

## 2024-03-26 RX ADMIN — OXYCODONE HYDROCHLORIDE 5 MILLIGRAM(S): 5 TABLET ORAL at 13:17

## 2024-03-26 RX ADMIN — APIXABAN 5 MILLIGRAM(S): 2.5 TABLET, FILM COATED ORAL at 18:30

## 2024-03-26 RX ADMIN — Medication 50 MILLIGRAM(S): at 03:54

## 2024-03-26 RX ADMIN — Medication 81 MILLIGRAM(S): at 12:17

## 2024-03-26 RX ADMIN — ATORVASTATIN CALCIUM 40 MILLIGRAM(S): 80 TABLET, FILM COATED ORAL at 21:35

## 2024-03-26 RX ADMIN — Medication 50 MILLIGRAM(S): at 18:30

## 2024-03-26 RX ADMIN — OXYCODONE HYDROCHLORIDE 10 MILLIGRAM(S): 5 TABLET ORAL at 21:35

## 2024-03-26 RX ADMIN — OXYCODONE HYDROCHLORIDE 10 MILLIGRAM(S): 5 TABLET ORAL at 17:01

## 2024-03-26 RX ADMIN — METHOCARBAMOL 500 MILLIGRAM(S): 500 TABLET, FILM COATED ORAL at 13:54

## 2024-03-26 RX ADMIN — HYDROMORPHONE HYDROCHLORIDE 30 MILLILITER(S): 2 INJECTION INTRAMUSCULAR; INTRAVENOUS; SUBCUTANEOUS at 07:55

## 2024-03-26 RX ADMIN — Medication 1000 MILLIGRAM(S): at 05:30

## 2024-03-26 RX ADMIN — Medication 50 MILLIGRAM(S): at 12:16

## 2024-03-26 RX ADMIN — METHOCARBAMOL 500 MILLIGRAM(S): 500 TABLET, FILM COATED ORAL at 21:38

## 2024-03-26 RX ADMIN — APIXABAN 5 MILLIGRAM(S): 2.5 TABLET, FILM COATED ORAL at 06:16

## 2024-03-26 RX ADMIN — Medication 1000 MILLIGRAM(S): at 21:34

## 2024-03-26 RX ADMIN — OXYCODONE HYDROCHLORIDE 5 MILLIGRAM(S): 5 TABLET ORAL at 12:17

## 2024-03-26 RX ADMIN — Medication 1000 MILLIGRAM(S): at 13:54

## 2024-03-26 RX ADMIN — SODIUM CHLORIDE 500 MILLILITER(S): 9 INJECTION INTRAMUSCULAR; INTRAVENOUS; SUBCUTANEOUS at 13:30

## 2024-03-26 RX ADMIN — SENNA PLUS 2 TABLET(S): 8.6 TABLET ORAL at 21:35

## 2024-03-26 RX ADMIN — OXYCODONE HYDROCHLORIDE 10 MILLIGRAM(S): 5 TABLET ORAL at 16:01

## 2024-03-26 NOTE — PROGRESS NOTE ADULT - SUBJECTIVE AND OBJECTIVE BOX
NEUROSURGERY PAIN MANAGEMENT CONSULT NOTE    Chief Complaint: back pain    HPI:  The patient is a 64 year old male who Presents today for elective posterior spinal fusion with instrumentation T9-Pelvis, lemos wing osteotomies, decompression T10-T11, T12-L1, L1-L4 and L5-S1, iliac fixation, tethers with Dr. Schwab.Of note has had 2 previous lumbar spine surgeries (per chart review lumbar laminectomy in 2003 and T10-L3 lami by Dr Anne in 2015). Has chronic pain but pain/symptoms rapidly worsened in february 2024. Ambulates with assistance of a walker. Takes oxycodone for his pain. Of note has history of afib on eliquis, and per chart review history of triple bypass surgery for which he was in cardiac rehab for 6 weeks.      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 1000 milliGRAM(s) Oral every 8 hours  HYDROmorphone PCA (1 mG/mL) 30 milliLiter(s) PCA Continuous PCA Continuous  HYDROmorphone PCA (1 mG/mL) Rescue Clinician Bolus 0.5 milliGRAM(s) IV Push every 4 hours  methocarbamol 500 milliGRAM(s) Oral every 8 hours  ondansetron   Disintegrating Tablet 4 milliGRAM(s) Oral every 6 hours  oxyCODONE    IR 5 milliGRAM(s) Oral every 4 hours PRN  oxyCODONE    IR 10 milliGRAM(s) Oral every 4 hours PRN  pregabalin 50 milliGRAM(s) Oral three times a day    Heme:  apixaban 5 milliGRAM(s) Oral two times a day    Antibiotics:    Cardiovascular:  metoprolol succinate ER 25 milliGRAM(s) Oral daily    GI:  pantoprazole    Tablet 40 milliGRAM(s) Oral before breakfast  polyethylene glycol 3350 17 Gram(s) Oral daily  senna 2 Tablet(s) Oral at bedtime    Endocrine:  atorvastatin 40 milliGRAM(s) Oral at bedtime    All Other Medications:  influenza   Vaccine 0.5 milliLiter(s) IntraMuscular once  lactated ringers. 1000 milliLiter(s) IV Continuous <Continuous>      Vital Signs Last 24 Hrs  T(C): 36.9 (26 Mar 2024 08:45), Max: 37.2 (26 Mar 2024 00:43)  T(F): 98.4 (26 Mar 2024 08:45), Max: 98.9 (26 Mar 2024 00:43)  HR: 68 (26 Mar 2024 08:45) (66 - 76)  BP: 117/72 (26 Mar 2024 08:45) (90/61 - 117/72)  BP(mean): 70 (25 Mar 2024 16:34) (70 - 80)  RR: 17 (26 Mar 2024 08:45) (11 - 20)  SpO2: 98% (26 Mar 2024 08:45) (95% - 100%)    Parameters below as of 26 Mar 2024 08:45  Patient On (Oxygen Delivery Method): room air        LABS:                        7.9    11.60 )-----------( 139      ( 26 Mar 2024 05:30 )             24.1     03-26    143  |  109<H>  |  19  ----------------------------<  144<H>  4.5   |  22  |  0.86    Ca    8.7      26 Mar 2024 05:30      PT/INR - ( 25 Mar 2024 14:09 )   PT: 13.8 sec;   INR: 1.22          PTT - ( 25 Mar 2024 14:09 )  PTT:30.6 sec  Urinalysis Basic - ( 26 Mar 2024 05:30 )    Color: x / Appearance: x / SG: x / pH: x  Gluc: 144 mg/dL / Ketone: x  / Bili: x / Urobili: x   Blood: x / Protein: x / Nitrite: x   Leuk Esterase: x / RBC: x / WBC x   Sq Epi: x / Non Sq Epi: x / Bacteria: x        RADIOLOGY:    Drug Screen:        REVIEW OF SYSTEMS:  CONSTITUTIONAL: No fever or fatigue O/N.   EYES: No eye pain, visual disturbances  ENMT:  No difficulty hearing. No throat pain  NECK: No pain or stiffness  RESPIRATORY: No cough, wheezing; No shortness of breath  CARDIOVASCULAR: No chest pain, palpitations.   GASTROINTESTINAL: Pt reports ___ passing gas. No bowel movements. No abdominal or epigastric pain. No nausea, vomiting.   GENITOURINARY: No dysuria, frequency, or incontinence  NEUROLOGICAL: No headaches, loss of strength, numbness, or tremors. No dizzinesss or lightheadedness with pain medications.   MUSCULOSKELETAL: Incisional back pain. No joint pain or swelling; No muscle, or extremity pain      FUNCTIONAL ASSESSMENT:  PAIN SCORE AT REST:         SCALE USED: (1-10 VNRS)  PAIN SCORE WITH ACTIVITY:         SCALE USED: (1-10 VNRS)    PAIN ASSESSMENT:    PHYSICAL EXAM  GENERAL: Laying in bed, NAD  Neuro: CN II-XII PERRRL, EOMI  Cranial nerves grossly intact  Motor exam:  Sensation intact to LT in UE/LE in 3 dermatomes  CHEST/LUNG: Clear to auscultation bilaterally; No rales, rhonchi, wheezing, or rubs  HEART: Regular rate and rhythm; No murmurs, rubs, or gallops  ABDOMEN: Soft, Nontender, Nondistended; Bowel sounds present  EXTREMITIES:  2+ Peripheral Pulses, No clubbing, cyanosis, or edema  SKIN: No rashes or lesions      ASSESSMENT: 64yMale s/p T9-Pelvis PSF by Dr. F. Schwab on 03-25      PLAN:   - continue tylenol 1gm q8h  - continue robaxin 500 q8h  - continue lyrica 50mg TID  - stop dPCA  - continue PO oxy 5-10mg q4h prn moderate-severe pain      - Home pain regimen:    - Bowel regimen: Senna, miralax  - Nausea ppx: Zofran as needed  - Functional Goals: Pt will get OOB with PT today. Pt will resume previous level of activity without impairment from surgery.   - Additional Consults: None recommended.   - Additional Labs/Imaging:  None recommended.     - Follow up, Discharge Planning: per primary team  - Pain Management follow up plan: will continue to follow    Plan d/w Dr. Medina.    PAIN MANAGEMENT CONSULT NOTE    Chief Complaint: back pain    HPI:  The patient is a 64 year old male who Presents today for elective posterior spinal fusion with instrumentation T9-Pelvis, lemos wing osteotomies, decompression T10-T11, T12-L1, L1-L4 and L5-S1, iliac fixation, tethers with Dr. Schwab.Of note has had 2 previous lumbar spine surgeries (per chart review lumbar laminectomy in 2003 and T10-L3 lami by Dr Anne in 2015). Has chronic pain but pain/symptoms rapidly worsened in february 2024. Ambulates with assistance of a walker. Takes oxycodone for his pain. Of note has history of afib on eliquis, and per chart review history of triple bypass surgery for which he was in cardiac rehab for 6 weeks.      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 1000 milliGRAM(s) Oral every 8 hours  HYDROmorphone PCA (1 mG/mL) 30 milliLiter(s) PCA Continuous PCA Continuous  HYDROmorphone PCA (1 mG/mL) Rescue Clinician Bolus 0.5 milliGRAM(s) IV Push every 4 hours  methocarbamol 500 milliGRAM(s) Oral every 8 hours  ondansetron   Disintegrating Tablet 4 milliGRAM(s) Oral every 6 hours  oxyCODONE    IR 5 milliGRAM(s) Oral every 4 hours PRN  oxyCODONE    IR 10 milliGRAM(s) Oral every 4 hours PRN  pregabalin 50 milliGRAM(s) Oral three times a day    Heme:  apixaban 5 milliGRAM(s) Oral two times a day    Antibiotics:    Cardiovascular:  metoprolol succinate ER 25 milliGRAM(s) Oral daily    GI:  pantoprazole    Tablet 40 milliGRAM(s) Oral before breakfast  polyethylene glycol 3350 17 Gram(s) Oral daily  senna 2 Tablet(s) Oral at bedtime    Endocrine:  atorvastatin 40 milliGRAM(s) Oral at bedtime    All Other Medications:  influenza   Vaccine 0.5 milliLiter(s) IntraMuscular once  lactated ringers. 1000 milliLiter(s) IV Continuous <Continuous>      Vital Signs Last 24 Hrs  T(C): 36.9 (26 Mar 2024 08:45), Max: 37.2 (26 Mar 2024 00:43)  T(F): 98.4 (26 Mar 2024 08:45), Max: 98.9 (26 Mar 2024 00:43)  HR: 68 (26 Mar 2024 08:45) (66 - 76)  BP: 117/72 (26 Mar 2024 08:45) (90/61 - 117/72)  BP(mean): 70 (25 Mar 2024 16:34) (70 - 80)  RR: 17 (26 Mar 2024 08:45) (11 - 20)  SpO2: 98% (26 Mar 2024 08:45) (95% - 100%)    Parameters below as of 26 Mar 2024 08:45  Patient On (Oxygen Delivery Method): room air        LABS:                        7.9    11.60 )-----------( 139      ( 26 Mar 2024 05:30 )             24.1     03-26    143  |  109<H>  |  19  ----------------------------<  144<H>  4.5   |  22  |  0.86    Ca    8.7      26 Mar 2024 05:30      PT/INR - ( 25 Mar 2024 14:09 )   PT: 13.8 sec;   INR: 1.22          PTT - ( 25 Mar 2024 14:09 )  PTT:30.6 sec  Urinalysis Basic - ( 26 Mar 2024 05:30 )    Color: x / Appearance: x / SG: x / pH: x  Gluc: 144 mg/dL / Ketone: x  / Bili: x / Urobili: x   Blood: x / Protein: x / Nitrite: x   Leuk Esterase: x / RBC: x / WBC x   Sq Epi: x / Non Sq Epi: x / Bacteria: x        RADIOLOGY:    Drug Screen:        REVIEW OF SYSTEMS:  CONSTITUTIONAL: No fever or fatigue O/N.   EYES: No eye pain, visual disturbances  ENMT:  No difficulty hearing. No throat pain  NECK: No pain or stiffness  RESPIRATORY: No cough, wheezing; No shortness of breath  CARDIOVASCULAR: No chest pain, palpitations.   GASTROINTESTINAL: Pt reports passing gas. No bowel movements. No abdominal or epigastric pain. No nausea, vomiting.   GENITOURINARY: No dysuria, frequency, or incontinence  NEUROLOGICAL: No headaches, loss of strength, numbness, or tremors. No dizziness or lightheadedness with pain medications.   MUSCULOSKELETAL: Incisional back pain. No joint pain or swelling; No muscle, or extremity pain      FUNCTIONAL ASSESSMENT:  PAIN SCORE AT REST:         SCALE USED: (1-10 VNRS)  PAIN SCORE WITH ACTIVITY:         SCALE USED: (1-10 VNRS)    PAIN ASSESSMENT:    PHYSICAL EXAM  GENERAL: Laying in bed, NAD  Neuro: CN II-XII PERRRL, EOMI  Cranial nerves grossly intact  Motor exam: GOLDEN, mild R foot drop  Sensation intact to LT in UE/LE in 3 dermatomes  CHEST/LUNG: Clear to auscultation bilaterally; No rales, rhonchi, wheezing, or rubs  HEART: Regular rate and rhythm; No murmurs, rubs, or gallops  ABDOMEN: Soft, Nontender, Nondistended; Bowel sounds present  EXTREMITIES:  2+ Peripheral Pulses, No clubbing, cyanosis, or edema  SKIN: No rashes or lesions      ASSESSMENT: 64yMale s/p T9-Pelvis PSF by Dr. F. Schwab on 03-25      PLAN:   - continue tylenol 1gm q8h  - continue robaxin 500 q8h  - continue lyrica 50mg TID  - stop dPCA  - continue PO oxy 5-10mg q4h prn moderate-severe pain, pt educated on use of PRNs      - Home pain regimen:    - Bowel regimen: Senna, miralax  - Nausea ppx: Zofran as needed  - Functional Goals: Pt will get OOB with PT today. Pt will resume previous level of activity without impairment from surgery.   - Additional Consults: None recommended.   - Additional Labs/Imaging:  None recommended.     - Follow up, Discharge Planning: per primary team  - Pain Management follow up plan: will continue to follow    Plan d/w Dr. Medina.    PAIN MANAGEMENT CONSULT NOTE    Chief Complaint: back pain    HPI:  The patient is a 64 year old male who Presents today for elective posterior spinal fusion with instrumentation T9-Pelvis, lemos wing osteotomies, decompression T10-T11, T12-L1, L1-L4 and L5-S1, iliac fixation, tethers with Dr. Schwab.Of note has had 2 previous lumbar spine surgeries (per chart review lumbar laminectomy in 2003 and T10-L3 lami by Dr Anne in 2015). Has chronic pain but pain/symptoms rapidly worsened in february 2024. Ambulates with assistance of a walker. Takes oxycodone for his pain. Of note has history of afib on eliquis, and per chart review history of triple bypass surgery for which he was in cardiac rehab for 6 weeks.      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 1000 milliGRAM(s) Oral every 8 hours  HYDROmorphone PCA (1 mG/mL) 30 milliLiter(s) PCA Continuous PCA Continuous  HYDROmorphone PCA (1 mG/mL) Rescue Clinician Bolus 0.5 milliGRAM(s) IV Push every 4 hours  methocarbamol 500 milliGRAM(s) Oral every 8 hours  ondansetron   Disintegrating Tablet 4 milliGRAM(s) Oral every 6 hours  oxyCODONE    IR 5 milliGRAM(s) Oral every 4 hours PRN  oxyCODONE    IR 10 milliGRAM(s) Oral every 4 hours PRN  pregabalin 50 milliGRAM(s) Oral three times a day    Heme:  apixaban 5 milliGRAM(s) Oral two times a day    Antibiotics:    Cardiovascular:  metoprolol succinate ER 25 milliGRAM(s) Oral daily    GI:  pantoprazole    Tablet 40 milliGRAM(s) Oral before breakfast  polyethylene glycol 3350 17 Gram(s) Oral daily  senna 2 Tablet(s) Oral at bedtime    Endocrine:  atorvastatin 40 milliGRAM(s) Oral at bedtime    All Other Medications:  influenza   Vaccine 0.5 milliLiter(s) IntraMuscular once  lactated ringers. 1000 milliLiter(s) IV Continuous <Continuous>      Vital Signs Last 24 Hrs  T(C): 36.9 (26 Mar 2024 08:45), Max: 37.2 (26 Mar 2024 00:43)  T(F): 98.4 (26 Mar 2024 08:45), Max: 98.9 (26 Mar 2024 00:43)  HR: 68 (26 Mar 2024 08:45) (66 - 76)  BP: 117/72 (26 Mar 2024 08:45) (90/61 - 117/72)  BP(mean): 70 (25 Mar 2024 16:34) (70 - 80)  RR: 17 (26 Mar 2024 08:45) (11 - 20)  SpO2: 98% (26 Mar 2024 08:45) (95% - 100%)    Parameters below as of 26 Mar 2024 08:45  Patient On (Oxygen Delivery Method): room air        LABS:                        7.9    11.60 )-----------( 139      ( 26 Mar 2024 05:30 )             24.1     03-26    143  |  109<H>  |  19  ----------------------------<  144<H>  4.5   |  22  |  0.86    Ca    8.7      26 Mar 2024 05:30      PT/INR - ( 25 Mar 2024 14:09 )   PT: 13.8 sec;   INR: 1.22          PTT - ( 25 Mar 2024 14:09 )  PTT:30.6 sec  Urinalysis Basic - ( 26 Mar 2024 05:30 )    Color: x / Appearance: x / SG: x / pH: x  Gluc: 144 mg/dL / Ketone: x  / Bili: x / Urobili: x   Blood: x / Protein: x / Nitrite: x   Leuk Esterase: x / RBC: x / WBC x   Sq Epi: x / Non Sq Epi: x / Bacteria: x        RADIOLOGY:    Drug Screen:        REVIEW OF SYSTEMS:  CONSTITUTIONAL: No fever or fatigue O/N.   EYES: No eye pain, visual disturbances  ENMT:  No difficulty hearing. No throat pain  NECK: No pain or stiffness  RESPIRATORY: No cough, wheezing; No shortness of breath  CARDIOVASCULAR: No chest pain, palpitations.   GASTROINTESTINAL: Pt reports passing gas. No bowel movements. No abdominal or epigastric pain. No nausea, vomiting.   GENITOURINARY: No dysuria, frequency, or incontinence  NEUROLOGICAL: No headaches, loss of strength, numbness, or tremors. No dizziness or lightheadedness with pain medications.   MUSCULOSKELETAL: Incisional back pain. No joint pain or swelling; No muscle, or extremity pain      FUNCTIONAL ASSESSMENT:  PAIN SCORE AT REST:         SCALE USED: (1-10 VNRS)  PAIN SCORE WITH ACTIVITY:         SCALE USED: (1-10 VNRS)    PAIN ASSESSMENT:    PHYSICAL EXAM  GENERAL: Laying in bed, NAD  Neuro: CN II-XII PERRRL, EOMI  Cranial nerves grossly intact  Motor exam: GOLDEN, mild R foot drop  Sensation intact to LT in UE/LE in 3 dermatomes  CHEST/LUNG: Clear to auscultation bilaterally; No rales, rhonchi, wheezing, or rubs  HEART: Regular rate and rhythm; No murmurs, rubs, or gallops  ABDOMEN: Soft, Nontender, Nondistended; Bowel sounds present  EXTREMITIES:  2+ Peripheral Pulses, No clubbing, cyanosis, or edema  SKIN: No rashes or lesions      ASSESSMENT: 64yMale s/p T9-Pelvis PSF by Dr. F. Schwab on 03-25      PLAN:   - continue tylenol 1gm q8h  - continue robaxin 500 q8h  - continue lyrica 50mg TID  - stop dPCA  - continue PO oxy 5-10mg q4h prn moderate-severe pain, pt educated on use of PRNs      - Home pain regimen: oxy 10 BID PRN    - Bowel regimen: Senna, miralax  - Nausea ppx: Zofran as needed  - Functional Goals: Pt will get OOB with PT today. Pt will resume previous level of activity without impairment from surgery.   - Additional Consults: None recommended.   - Additional Labs/Imaging:  None recommended.     - Follow up, Discharge Planning: per primary team  - Pain Management follow up plan: will continue to follow    Plan d/w Dr. Medina.

## 2024-03-26 NOTE — CONSULT NOTE ADULT - SUBJECTIVE AND OBJECTIVE BOX
HPI "The patient is a 64 year old male who Presents today for elective posterior spinal fusion with instrumentation T9-Pelvis, lemos wing osteotomies, decompression T10-T11, T12-L1, L1-L4 and L5-S1, iliac fixation, tethers with Dr. Schwab.Of note has had 2 previous lumbar spine surgeries (per chart review lumbar laminectomy in 2003 and T10-L3 lami by Dr Anne in 2015). Has chronic pain but pain/symptoms rapidly worsened in february 2024. Ambulates with assistance of a walker. Takes oxycodone for his pain. Of note has history of afib on eliquis, and per chart review history of triple bypass surgery for which he was in cardiac rehab for 6 weeks.    (22 Mar 2024 10:07)"    64M w CAD s/p CABG, AFib on eliquis, HTN, prior spine surgeries w chronic pain, R foot drop, here for elective PSFT T9-Pelvis w Dr. Schwab 3/25    #Post-op state  #Lumbar stenosis   - Tylenol 1g q8, robaxin 500 q8, lyrica 50 q8h   - PCA: 0.2mg demand q6min lock out.   - PRN: Oxycodone 5/10 q4 for mod/severe pain     #Acute blood loss anemia - 7.9 from 9.9. Baseline __  #Leukocytosis - 11.6    #AFib - Toprol 100mg, eliquis 5mg BID  #CAD s/p CABG - on ASA 81 daily, toprol 100, atorva 40  #HTN - on losartan 100mg - currently held    Plan  Restart ASA 81 mg daily d/t CAD  Please change to DASH diet  TOV  INCOMPLETE    DISPO: HPT    PAST MEDICAL & SURGICAL HISTORY:  CAD (coronary artery disease)      HTN (hypertension)      Atrial fibrillation      Flatback syndrome      S/P CABG x 3  8/23      History of lumbar surgery  stenosis      Lumbar herniated disc        Home Meds: Home Medications:  aspirin 81 mg oral tablet: orally once a day (25 Mar 2024 06:31)  Eliquis 5 mg oral tablet: 1 tab(s) orally 2 times a day (22 Mar 2024 09:40)  gabapentin 300 mg oral capsule: 1 cap(s) orally 2 times a day (25 Mar 2024 06:31)  losartan 100 mg oral tablet: 1 tab(s) orally once a day (25 Mar 2024 06:31)  metoprolol succinate 25 mg oral capsule, extended release: 1 cap(s) orally once a day (25 Mar 2024 06:31)  oxyCODONE 5 mg oral tablet: 1 tab(s) orally prn (25 Mar 2024 06:31)    Allergies: Allergies    Allergy Status Unknown    Intolerances      Soc:   Advanced Directives: Presumed Full Code     CURRENT MEDICATIONS:   --------------------------------------------------------------------------------------  Neurologic Medications  acetaminophen     Tablet .. 1000 milliGRAM(s) Oral every 8 hours  HYDROmorphone PCA (1 mG/mL) 30 milliLiter(s) PCA Continuous PCA Continuous  HYDROmorphone PCA (1 mG/mL) Rescue Clinician Bolus 0.5 milliGRAM(s) IV Push every 4 hours  methocarbamol 500 milliGRAM(s) Oral every 8 hours  ondansetron   Disintegrating Tablet 4 milliGRAM(s) Oral every 6 hours  oxyCODONE    IR 5 milliGRAM(s) Oral every 4 hours PRN Moderate Pain (4 - 6)  oxyCODONE    IR 10 milliGRAM(s) Oral every 4 hours PRN Severe Pain (7 - 10)  pregabalin 50 milliGRAM(s) Oral three times a day    Respiratory Medications    Cardiovascular Medications  metoprolol succinate ER 25 milliGRAM(s) Oral daily    Gastrointestinal Medications  lactated ringers. 1000 milliLiter(s) IV Continuous <Continuous>  pantoprazole    Tablet 40 milliGRAM(s) Oral before breakfast  polyethylene glycol 3350 17 Gram(s) Oral daily  senna 2 Tablet(s) Oral at bedtime    Genitourinary Medications    Hematologic/Oncologic Medications  apixaban 5 milliGRAM(s) Oral two times a day  influenza   Vaccine 0.5 milliLiter(s) IntraMuscular once    Antimicrobial/Immunologic Medications    Endocrine/Metabolic Medications  atorvastatin 40 milliGRAM(s) Oral at bedtime    Topical/Other Medications    --------------------------------------------------------------------------------------    VITAL SIGNS, INS/OUTS (last 24 hours):  --------------------------------------------------------------------------------------  ICU Vital Signs Last 24 Hrs  T(C): 36.9 (26 Mar 2024 08:45), Max: 37.2 (26 Mar 2024 00:43)  T(F): 98.4 (26 Mar 2024 08:45), Max: 98.9 (26 Mar 2024 00:43)  HR: 68 (26 Mar 2024 08:45) (66 - 76)  BP: 117/72 (26 Mar 2024 08:45) (90/61 - 117/72)  BP(mean): 70 (25 Mar 2024 16:34) (70 - 80)  ABP: 43/42 (25 Mar 2024 16:34) (43/42 - 98/58)  ABP(mean): 43 (25 Mar 2024 16:34) (43 - 73)  RR: 17 (26 Mar 2024 08:45) (11 - 20)  SpO2: 98% (26 Mar 2024 08:45) (95% - 100%)    O2 Parameters below as of 26 Mar 2024 08:45  Patient On (Oxygen Delivery Method): room air          I&O's Summary    25 Mar 2024 07:01  -  26 Mar 2024 07:00  --------------------------------------------------------  IN: 0 mL / OUT: 1480 mL / NET: -1480 mL      --------------------------------------------------------------------------------------    EXAM:      LABS  --------------------------------------------------------------------------------------  Labs:  CAPILLARY BLOOD GLUCOSE                              7.9    11.60 )-----------( 139      ( 26 Mar 2024 05:30 )             24.1       Auto Neutrophil %: 87.4 % (03-26-24 @ 05:30)  Auto Immature Granulocyte %: 0.7 % (03-26-24 @ 05:30)    03-26    143  |  109<H>  |  19  ----------------------------<  144<H>  4.5   |  22  |  0.86      Calcium: 8.7 mg/dL (03-26-24 @ 05:30)      LFTs:         Coags:     13.8   ----< 1.22    ( 25 Mar 2024 14:09 )     30.6                Urinalysis Basic - ( 26 Mar 2024 05:30 )    Color: x / Appearance: x / SG: x / pH: x  Gluc: 144 mg/dL / Ketone: x  / Bili: x / Urobili: x   Blood: x / Protein: x / Nitrite: x   Leuk Esterase: x / RBC: x / WBC x   Sq Epi: x / Non Sq Epi: x / Bacteria: x          --------------------------------------------------------------------------------------    OTHER LABS    IMAGING RESULTS  ****************     HPI "The patient is a 64 year old male who Presents today for elective posterior spinal fusion with instrumentation T9-Pelvis, lemos wing osteotomies, decompression T10-T11, T12-L1, L1-L4 and L5-S1, iliac fixation, tethers with Dr. Schwab.Of note has had 2 previous lumbar spine surgeries (per chart review lumbar laminectomy in 2003 and T10-L3 lami by Dr Anne in 2015). Has chronic pain but pain/symptoms rapidly worsened in february 2024. Ambulates with assistance of a walker. Takes oxycodone for his pain. Of note has history of afib on eliquis, and per chart review history of triple bypass surgery for which he was in cardiac rehab for 6 weeks.    (22 Mar 2024 10:07)"    64M w CAD s/p CABG, AFib on eliquis, HTN, prior spine surgeries w chronic pain, R foot drop, here for elective PSFT T9-Pelvis w Dr. Schwab 3/25    Pt reports having chronic and progressive low back with R foot drop that did not improve w conservative measures. Today reports pain is localized mostly in his incisions. Denies any LH/dizziness, chest pain, dyspnea, increased fatigue. Eating wo N/V/Abd pain. +Flatus. Mckeon in place.     ROS: 12 point ROS reviewed and otherwise negative per HPI  FH: No DVT/PE   SH: Non smoker      PAST MEDICAL & SURGICAL HISTORY:  CAD (coronary artery disease)      HTN (hypertension)      Atrial fibrillation      Flatback syndrome      S/P CABG x 3  8/23      History of lumbar surgery  stenosis      Lumbar herniated disc        Home Meds: Home Medications:  aspirin 81 mg oral tablet: orally once a day (25 Mar 2024 06:31)  Eliquis 5 mg oral tablet: 1 tab(s) orally 2 times a day (22 Mar 2024 09:40)  gabapentin 300 mg oral capsule: 1 cap(s) orally 2 times a day (25 Mar 2024 06:31)  losartan 100 mg oral tablet: 1 tab(s) orally once a day (25 Mar 2024 06:31)  metoprolol succinate 25 mg oral capsule, extended release: 1 cap(s) orally once a day (25 Mar 2024 06:31)  oxyCODONE 5 mg oral tablet: 1 tab(s) orally prn (25 Mar 2024 06:31)    Allergies: Allergies    Allergy Status Unknown    Intolerances      Soc:   Advanced Directives: Presumed Full Code     CURRENT MEDICATIONS:   --------------------------------------------------------------------------------------  Neurologic Medications  acetaminophen     Tablet .. 1000 milliGRAM(s) Oral every 8 hours  HYDROmorphone PCA (1 mG/mL) 30 milliLiter(s) PCA Continuous PCA Continuous  HYDROmorphone PCA (1 mG/mL) Rescue Clinician Bolus 0.5 milliGRAM(s) IV Push every 4 hours  methocarbamol 500 milliGRAM(s) Oral every 8 hours  ondansetron   Disintegrating Tablet 4 milliGRAM(s) Oral every 6 hours  oxyCODONE    IR 5 milliGRAM(s) Oral every 4 hours PRN Moderate Pain (4 - 6)  oxyCODONE    IR 10 milliGRAM(s) Oral every 4 hours PRN Severe Pain (7 - 10)  pregabalin 50 milliGRAM(s) Oral three times a day    Respiratory Medications    Cardiovascular Medications  metoprolol succinate ER 25 milliGRAM(s) Oral daily    Gastrointestinal Medications  lactated ringers. 1000 milliLiter(s) IV Continuous <Continuous>  pantoprazole    Tablet 40 milliGRAM(s) Oral before breakfast  polyethylene glycol 3350 17 Gram(s) Oral daily  senna 2 Tablet(s) Oral at bedtime    Genitourinary Medications    Hematologic/Oncologic Medications  apixaban 5 milliGRAM(s) Oral two times a day  influenza   Vaccine 0.5 milliLiter(s) IntraMuscular once    Antimicrobial/Immunologic Medications    Endocrine/Metabolic Medications  atorvastatin 40 milliGRAM(s) Oral at bedtime    Topical/Other Medications    --------------------------------------------------------------------------------------    VITAL SIGNS, INS/OUTS (last 24 hours):  --------------------------------------------------------------------------------------  ICU Vital Signs Last 24 Hrs  T(C): 36.9 (26 Mar 2024 08:45), Max: 37.2 (26 Mar 2024 00:43)  T(F): 98.4 (26 Mar 2024 08:45), Max: 98.9 (26 Mar 2024 00:43)  HR: 68 (26 Mar 2024 08:45) (66 - 76)  BP: 117/72 (26 Mar 2024 08:45) (90/61 - 117/72)  BP(mean): 70 (25 Mar 2024 16:34) (70 - 80)  ABP: 43/42 (25 Mar 2024 16:34) (43/42 - 98/58)  ABP(mean): 43 (25 Mar 2024 16:34) (43 - 73)  RR: 17 (26 Mar 2024 08:45) (11 - 20)  SpO2: 98% (26 Mar 2024 08:45) (95% - 100%)    O2 Parameters below as of 26 Mar 2024 08:45  Patient On (Oxygen Delivery Method): room air          I&O's Summary    25 Mar 2024 07:01  -  26 Mar 2024 07:00  --------------------------------------------------------  IN: 0 mL / OUT: 1480 mL / NET: -1480 mL      --------------------------------------------------------------------------------------    EXAM:  GEN: Male in NAD on RA  HEENT: NC/AT, MMM  CV: RRR, nml S1S2, no murmurs  PULM: nml effort, CTAB  ABD: Soft, non-distended, NABS, non-tender  NEURO  A/O x3, moving all extremities, Sensation intact.4+/5 in R foot plantarfflexion - chronic. Otherwise 5/5  Lumbar dressing c/d/i  PSYCH: Appropriate      LABS  --------------------------------------------------------------------------------------  Labs:  CAPILLARY BLOOD GLUCOSE                              7.9    11.60 )-----------( 139      ( 26 Mar 2024 05:30 )             24.1       Auto Neutrophil %: 87.4 % (03-26-24 @ 05:30)  Auto Immature Granulocyte %: 0.7 % (03-26-24 @ 05:30)    03-26    143  |  109<H>  |  19  ----------------------------<  144<H>  4.5   |  22  |  0.86      Calcium: 8.7 mg/dL (03-26-24 @ 05:30)      LFTs:         Coags:     13.8   ----< 1.22    ( 25 Mar 2024 14:09 )     30.6                Urinalysis Basic - ( 26 Mar 2024 05:30 )    Color: x / Appearance: x / SG: x / pH: x  Gluc: 144 mg/dL / Ketone: x  / Bili: x / Urobili: x   Blood: x / Protein: x / Nitrite: x   Leuk Esterase: x / RBC: x / WBC x   Sq Epi: x / Non Sq Epi: x / Bacteria: x          --------------------------------------------------------------------------------------    OTHER LABS    IMAGING RESULTS  ****************

## 2024-03-26 NOTE — PROGRESS NOTE ADULT - SUBJECTIVE AND OBJECTIVE BOX
Ortho Progress Note    Procedure: T9-Pelvis PSF on 3/25  Surgeon: Dr. F. Schwab    Pt comfortable without complaints, pain controlled  Denies CP, SOB, N/V, numbness/tingling     Vital Signs Last 24 Hrs  T(C): 36.3 (03-26-24 @ 05:10), Max: 37.2 (03-26-24 @ 00:43)  T(F): 97.4 (03-26-24 @ 05:10), Max: 98.9 (03-26-24 @ 00:43)  HR: 75 (03-26-24 @ 05:10) (75 - 76)  BP: 116/74 (03-26-24 @ 05:10) (107/64 - 116/74)  BP(mean): --  RR: 16 (03-26-24 @ 05:10) (15 - 16)  SpO2: 98% (03-26-24 @ 05:10) (98% - 98%)    General: Pt Alert and oriented, NAD  DSG C/D/I  Drains: JPx1  Pulses: 2+ DP  Sensation: SILT grossly L2-S1 bilaterally  Motor: 5/5 L2-S1 bilaterally    A/P: 64yMale s/p T9-Pelvis PSF by Dr. F. Schwab on 03-25  - Stable  - Pain Control  - DVT ppx: SCDs, Home Eliquis to restart POD1  - Post op abx: Ancef  - Drains: JPx1 125/480  - WBS: WBAT  - HPT pending clearance  - TOV today

## 2024-03-26 NOTE — PROGRESS NOTE ADULT - SUBJECTIVE AND OBJECTIVE BOX
Ortho Note    Subjective:  Pt seen and examined today, patient reporting lower back and surgical site pain, pain controlled with current pain medication. Patient reports only pressing the Dilaudid PCA twice an hour.   Discussed plan to switch to an oral pain medication regimen   Denies CP, SOB, N/V, numbness/tingling   Patient reports weakness to his R foot with ehl/fhl     Vital Signs Last 24 Hrs  T(C): 36.9 (03-26-24 @ 08:45), Max: 36.9 (03-26-24 @ 08:45)  T(F): 98.4 (03-26-24 @ 08:45), Max: 98.4 (03-26-24 @ 08:45)  HR: 68 (03-26-24 @ 08:45) (68 - 68)  BP: 117/72 (03-26-24 @ 08:45) (117/72 - 117/72)  BP(mean): --  RR: 17 (03-26-24 @ 08:45) (17 - 17)  SpO2: 98% (03-26-24 @ 08:45) (98% - 98%)  AVSS    Objective:    Physical Exam:  General: Pt Alert and oriented, NAD  thoracic/Lumbar Prineo dressing  Pulses: +2 pedal pulses,   Sensation: silt intact bilateral lower extremities  Motor: EHL/FHL/TA/GS-  5/5 LLE  1/5 RLE - appropriate with baseline        Plan of Care:  A/P: 64yMale s/p T9-Pelvis PSF by Dr. F. Schwab on 03-25  - afebrile, wbcs 11.60  - Pain Control- DC pca,   tylenol 1000mg PO Q8h, methocarbamol 500mg PO Q8h, Oxycodone 5-10mg PO Q4h prn moderate to severe pain, lyrica 50mg PO TID   - DVT ppx: apixaban 5mg PO BID, asa 81mg PO daily   - PT, WBS: WBAT  - appreciate medicine recs  - appreciate pain management recs  - bowel regimen, IS use, PPI   - dc inga vega  - continue CAMILO  - Dispo- home pending pt clearance, standing xray. medical clearance and dc of drains       Ortho Pager [phone] Ortho Note    Subjective:  Pt seen and examined today, patient reporting lower back and surgical site pain, pain controlled with current pain medication. Patient reports only pressing the Dilaudid PCA twice an hour.   Discussed plan to switch to an oral pain medication regimen   Denies CP, SOB, N/V, numbness/tingling   Patient reports weakness to his R foot with plantar flexion from preop    Vital Signs Last 24 Hrs  T(C): 36.9 (03-26-24 @ 08:45), Max: 36.9 (03-26-24 @ 08:45)  T(F): 98.4 (03-26-24 @ 08:45), Max: 98.4 (03-26-24 @ 08:45)  HR: 68 (03-26-24 @ 08:45) (68 - 68)  BP: 117/72 (03-26-24 @ 08:45) (117/72 - 117/72)  BP(mean): --  RR: 17 (03-26-24 @ 08:45) (17 - 17)  SpO2: 98% (03-26-24 @ 08:45) (98% - 98%)  AVSS    Objective:    Physical Exam:  General: Pt Alert and oriented, NAD  thoracic/Lumbar Prineo dressing  Pulses: +2 pedal pulses,   Sensation: silt intact bilateral lower extremities  Motor: EHL/FHL/TA/GS-  5/5 LLE  RLE ehl- 2/5  /fhl - 3/5  TA-tibialis posterior  3/5  tibialis posterior /GaStroc- 2/5         Plan of Care:  A/P: 64yMale s/p T9-Pelvis PSF by Dr. F. Schwab on 03-25  - afebrile, wbcs 11.60  - Pain Control- DC pca,   tylenol 1000mg PO Q8h, methocarbamol 500mg PO Q8h, Oxycodone 5-10mg PO Q4h prn moderate to severe pain, lyrica 50mg PO TID   - DVT ppx: apixaban 5mg PO BID, asa 81mg PO daily   - PT, WBS: WBAT  - appreciate medicine recs  - appreciate pain management recs  - bowel regimen, IS use, PPI   - dc vega, tov  - continue CAMILO  - Dispo- home pending pt clearance, standing xray. medical clearance and dc of drains       Ortho Pager [phone]

## 2024-03-26 NOTE — CONSULT NOTE ADULT - ASSESSMENT
64M w CAD s/p CABG, AFib on eliquis, HTN, prior spine surgeries w chronic pain, R foot drop, here for elective PSFT T9-Pelvis w Dr. Schwab 3/25    #Post-op state  #Lumbar stenosis   - Tylenol 1g q8, robaxin 500 q8, lyrica 50 q8h   - PCA: 0.2mg demand q6min lock out.   - PRN: Oxycodone 5/10 q4 for mod/severe pain     #Acute blood loss anemia - 7.9 from 9.9. Baseline 14.2  #Leukocytosis - 11.6    #AFib s/p radioablation and WILLIAMS clipping -  - Toprol 100mg, eliquis 5mg BID  #CAD s/p CABG - on ASA 81 daily, toprol 100, atorva 40   - TTE 9/2023 - LVEF 45-50%  #HTN - on losartan 100mg - currently held    Plan  Restart ASA 81 mg daily d/t CAD  Please change to DASH diet d/t CAD  TOV  INCOMPLETE    DISPO: HPT 64M w CAD s/p CABG, AFib on eliquis, HTN, prior spine surgeries w chronic pain, R foot drop, here for elective PSFT T9-Pelvis w Dr. Schwab 3/25    #Post-op state - pain controlled. PPx: Eliquis 5 BID. On bowel regimen and incentive spirometer  #Lumbar stenosis   - Tylenol 1g q8, robaxin 500 q8, lyrica 50 q8h   - PCA: 0.2mg demand q6min lock out.   - PRN: Oxycodone 5/10 q4 for mod/severe pain     #Acute blood loss anemia - 7.9 from 9.9. Baseline 14.2. Asymptomatic  #Leukocytosis - 11.6. Likely reactive as pt afebrile and non-toxic    #AFib s/p radioablation and WILLIAMS clipping - Toprol 100mg, eliquis 5mg BID  #CAD s/p CABG - on ASA 81 daily, toprol 100, atorva 40   - TTE 9/2023 - LVEF 45-50%  #HTN - on losartan 100mg - currently held    Plan  Restart ASA 81 mg daily d/t CAD  Please change to DASH diet d/t CAD  TOV  monitor for orthostasis  CBC w diff in AM    DISPO: HPT  Above d/w orthopedics

## 2024-03-27 LAB
ANION GAP SERPL CALC-SCNC: 6 MMOL/L — SIGNIFICANT CHANGE UP (ref 5–17)
BASOPHILS # BLD AUTO: 0.01 K/UL — SIGNIFICANT CHANGE UP (ref 0–0.2)
BASOPHILS NFR BLD AUTO: 0.1 % — SIGNIFICANT CHANGE UP (ref 0–2)
BLD GP AB SCN SERPL QL: NEGATIVE — SIGNIFICANT CHANGE UP
BUN SERPL-MCNC: 19 MG/DL — SIGNIFICANT CHANGE UP (ref 7–23)
CALCIUM SERPL-MCNC: 8.8 MG/DL — SIGNIFICANT CHANGE UP (ref 8.4–10.5)
CHLORIDE SERPL-SCNC: 105 MMOL/L — SIGNIFICANT CHANGE UP (ref 96–108)
CO2 SERPL-SCNC: 28 MMOL/L — SIGNIFICANT CHANGE UP (ref 22–31)
CREAT SERPL-MCNC: 0.87 MG/DL — SIGNIFICANT CHANGE UP (ref 0.5–1.3)
EGFR: 96 ML/MIN/1.73M2 — SIGNIFICANT CHANGE UP
EOSINOPHIL # BLD AUTO: 0.08 K/UL — SIGNIFICANT CHANGE UP (ref 0–0.5)
EOSINOPHIL NFR BLD AUTO: 0.9 % — SIGNIFICANT CHANGE UP (ref 0–6)
GLUCOSE SERPL-MCNC: 148 MG/DL — HIGH (ref 70–99)
HCT VFR BLD CALC: 23 % — LOW (ref 39–50)
HCT VFR BLD CALC: 25.7 % — LOW (ref 39–50)
HGB BLD-MCNC: 7.6 G/DL — LOW (ref 13–17)
HGB BLD-MCNC: 8.5 G/DL — LOW (ref 13–17)
IMM GRANULOCYTES NFR BLD AUTO: 0.3 % — SIGNIFICANT CHANGE UP (ref 0–0.9)
LYMPHOCYTES # BLD AUTO: 1.38 K/UL — SIGNIFICANT CHANGE UP (ref 1–3.3)
LYMPHOCYTES # BLD AUTO: 15.8 % — SIGNIFICANT CHANGE UP (ref 13–44)
MCHC RBC-ENTMCNC: 30.1 PG — SIGNIFICANT CHANGE UP (ref 27–34)
MCHC RBC-ENTMCNC: 30.3 PG — SIGNIFICANT CHANGE UP (ref 27–34)
MCHC RBC-ENTMCNC: 33 GM/DL — SIGNIFICANT CHANGE UP (ref 32–36)
MCHC RBC-ENTMCNC: 33.1 GM/DL — SIGNIFICANT CHANGE UP (ref 32–36)
MCV RBC AUTO: 91.1 FL — SIGNIFICANT CHANGE UP (ref 80–100)
MCV RBC AUTO: 91.6 FL — SIGNIFICANT CHANGE UP (ref 80–100)
MONOCYTES # BLD AUTO: 0.79 K/UL — SIGNIFICANT CHANGE UP (ref 0–0.9)
MONOCYTES NFR BLD AUTO: 9 % — SIGNIFICANT CHANGE UP (ref 2–14)
NEUTROPHILS # BLD AUTO: 6.45 K/UL — SIGNIFICANT CHANGE UP (ref 1.8–7.4)
NEUTROPHILS NFR BLD AUTO: 73.9 % — SIGNIFICANT CHANGE UP (ref 43–77)
NRBC # BLD: 0 /100 WBCS — SIGNIFICANT CHANGE UP (ref 0–0)
NRBC # BLD: 0 /100 WBCS — SIGNIFICANT CHANGE UP (ref 0–0)
PLATELET # BLD AUTO: 131 K/UL — LOW (ref 150–400)
PLATELET # BLD AUTO: 135 K/UL — LOW (ref 150–400)
POTASSIUM SERPL-MCNC: 3.9 MMOL/L — SIGNIFICANT CHANGE UP (ref 3.5–5.3)
POTASSIUM SERPL-SCNC: 3.9 MMOL/L — SIGNIFICANT CHANGE UP (ref 3.5–5.3)
RBC # BLD: 2.51 M/UL — LOW (ref 4.2–5.8)
RBC # BLD: 2.82 M/UL — LOW (ref 4.2–5.8)
RBC # FLD: 13.8 % — SIGNIFICANT CHANGE UP (ref 10.3–14.5)
RBC # FLD: 14.5 % — SIGNIFICANT CHANGE UP (ref 10.3–14.5)
RH IG SCN BLD-IMP: POSITIVE — SIGNIFICANT CHANGE UP
SODIUM SERPL-SCNC: 139 MMOL/L — SIGNIFICANT CHANGE UP (ref 135–145)
WBC # BLD: 8.49 K/UL — SIGNIFICANT CHANGE UP (ref 3.8–10.5)
WBC # BLD: 8.74 K/UL — SIGNIFICANT CHANGE UP (ref 3.8–10.5)
WBC # FLD AUTO: 8.49 K/UL — SIGNIFICANT CHANGE UP (ref 3.8–10.5)
WBC # FLD AUTO: 8.74 K/UL — SIGNIFICANT CHANGE UP (ref 3.8–10.5)

## 2024-03-27 PROCEDURE — 99233 SBSQ HOSP IP/OBS HIGH 50: CPT

## 2024-03-27 RX ORDER — KETOROLAC TROMETHAMINE 30 MG/ML
30 SYRINGE (ML) INJECTION EVERY 8 HOURS
Refills: 0 | Status: DISCONTINUED | OUTPATIENT
Start: 2024-03-27 | End: 2024-03-29

## 2024-03-27 RX ORDER — METHOCARBAMOL 500 MG/1
750 TABLET, FILM COATED ORAL EVERY 8 HOURS
Refills: 0 | Status: DISCONTINUED | OUTPATIENT
Start: 2024-03-27 | End: 2024-03-29

## 2024-03-27 RX ADMIN — Medication 1000 MILLIGRAM(S): at 21:54

## 2024-03-27 RX ADMIN — APIXABAN 5 MILLIGRAM(S): 2.5 TABLET, FILM COATED ORAL at 05:24

## 2024-03-27 RX ADMIN — OXYCODONE HYDROCHLORIDE 10 MILLIGRAM(S): 5 TABLET ORAL at 05:24

## 2024-03-27 RX ADMIN — Medication 1000 MILLIGRAM(S): at 06:24

## 2024-03-27 RX ADMIN — METHOCARBAMOL 750 MILLIGRAM(S): 500 TABLET, FILM COATED ORAL at 13:55

## 2024-03-27 RX ADMIN — OXYCODONE HYDROCHLORIDE 10 MILLIGRAM(S): 5 TABLET ORAL at 20:16

## 2024-03-27 RX ADMIN — PANTOPRAZOLE SODIUM 40 MILLIGRAM(S): 20 TABLET, DELAYED RELEASE ORAL at 05:51

## 2024-03-27 RX ADMIN — OXYCODONE HYDROCHLORIDE 10 MILLIGRAM(S): 5 TABLET ORAL at 06:24

## 2024-03-27 RX ADMIN — Medication 50 MILLIGRAM(S): at 11:20

## 2024-03-27 RX ADMIN — SENNA PLUS 2 TABLET(S): 8.6 TABLET ORAL at 21:54

## 2024-03-27 RX ADMIN — OXYCODONE HYDROCHLORIDE 10 MILLIGRAM(S): 5 TABLET ORAL at 09:25

## 2024-03-27 RX ADMIN — APIXABAN 5 MILLIGRAM(S): 2.5 TABLET, FILM COATED ORAL at 17:21

## 2024-03-27 RX ADMIN — Medication 30 MILLIGRAM(S): at 21:54

## 2024-03-27 RX ADMIN — OXYCODONE HYDROCHLORIDE 10 MILLIGRAM(S): 5 TABLET ORAL at 10:25

## 2024-03-27 RX ADMIN — Medication 81 MILLIGRAM(S): at 11:20

## 2024-03-27 RX ADMIN — ONDANSETRON 4 MILLIGRAM(S): 8 TABLET, FILM COATED ORAL at 11:20

## 2024-03-27 RX ADMIN — Medication 50 MILLIGRAM(S): at 02:00

## 2024-03-27 RX ADMIN — Medication 30 MILLIGRAM(S): at 13:55

## 2024-03-27 RX ADMIN — Medication 50 MILLIGRAM(S): at 18:55

## 2024-03-27 RX ADMIN — METHOCARBAMOL 750 MILLIGRAM(S): 500 TABLET, FILM COATED ORAL at 21:54

## 2024-03-27 RX ADMIN — OXYCODONE HYDROCHLORIDE 10 MILLIGRAM(S): 5 TABLET ORAL at 15:09

## 2024-03-27 RX ADMIN — Medication 1000 MILLIGRAM(S): at 22:30

## 2024-03-27 RX ADMIN — OXYCODONE HYDROCHLORIDE 10 MILLIGRAM(S): 5 TABLET ORAL at 16:09

## 2024-03-27 RX ADMIN — OXYCODONE HYDROCHLORIDE 10 MILLIGRAM(S): 5 TABLET ORAL at 21:16

## 2024-03-27 RX ADMIN — Medication 1000 MILLIGRAM(S): at 05:23

## 2024-03-27 RX ADMIN — Medication 1000 MILLIGRAM(S): at 13:55

## 2024-03-27 RX ADMIN — METHOCARBAMOL 500 MILLIGRAM(S): 500 TABLET, FILM COATED ORAL at 05:24

## 2024-03-27 RX ADMIN — ATORVASTATIN CALCIUM 40 MILLIGRAM(S): 80 TABLET, FILM COATED ORAL at 21:54

## 2024-03-27 RX ADMIN — Medication 30 MILLIGRAM(S): at 22:30

## 2024-03-27 NOTE — OCCUPATIONAL THERAPY INITIAL EVALUATION ADULT - PHYSICAL ASSIST/NONPHYSICAL ASSIST: SUPINE/SIT, REHAB EVAL
Educated on log roll technique in order to maintain spinal precautions./verbal cues/nonverbal cues (demo/gestures)/1 person assist

## 2024-03-27 NOTE — DIETITIAN INITIAL EVALUATION ADULT - PROBLEM SELECTOR PLAN 1
Admit to Orthopaedic Service.  Presents today for elective posterior spinal fusion with instrumentation T9-Pelvis, lemos wing osteotomies, decompression T10-T11, T12-L1, L1-L4 and L5-S1, iliac fixation, tethers with Dr. Schwab  Pt medically stable and cleared for procedure today by Dr. Apple

## 2024-03-27 NOTE — OCCUPATIONAL THERAPY INITIAL EVALUATION ADULT - MODIFIED CLINICAL TEST OF SENSORY INTEGRATION IN BALANCE TEST
Pt maintained standing for approx 2 mins with min-mod A x1 and RW, however L knee buckling noted, no overt LOB. Pt only able to sustain for taking BP as pt found to be orthostatic and becoming diaphoretic. Pt seated EOB, able to scoot up towards HOB while seated EOB with Amarjit. Unable to perform sidesteps due to orthostatic hypotension.

## 2024-03-27 NOTE — PROGRESS NOTE ADULT - SUBJECTIVE AND OBJECTIVE BOX
PAIN MANAGEMENT CONSULT NOTE    Interval Events:  -         PAST MEDICAL & SURGICAL HISTORY:  CAD (coronary artery disease)      HTN (hypertension)      Atrial fibrillation      Flatback syndrome      S/P CABG x 3  8/23      History of lumbar surgery  stenosis      Lumbar herniated disc          FAMILY HISTORY:      SOCIAL HISTORY:  [ ] Denies Smoking, Alcohol, or Drug Use    HOME MEDICATIONS:   Please refer to initial HNP    PAIN HOME MEDICATIONS:    Allergies    Allergy Status Unknown    Intolerances        PAIN MEDICATIONS:  acetaminophen     Tablet .. 1000 milliGRAM(s) Oral every 8 hours  methocarbamol 500 milliGRAM(s) Oral every 8 hours  ondansetron   Disintegrating Tablet 4 milliGRAM(s) Oral every 6 hours  oxyCODONE    IR 10 milliGRAM(s) Oral every 4 hours PRN  oxyCODONE    IR 5 milliGRAM(s) Oral every 4 hours PRN  pregabalin 50 milliGRAM(s) Oral three times a day    Heme:  apixaban 5 milliGRAM(s) Oral two times a day  aspirin  chewable 81 milliGRAM(s) Oral daily    Antibiotics:    Cardiovascular:  metoprolol succinate ER 25 milliGRAM(s) Oral daily    GI:  pantoprazole    Tablet 40 milliGRAM(s) Oral before breakfast  polyethylene glycol 3350 17 Gram(s) Oral daily  senna 2 Tablet(s) Oral at bedtime    Endocrine:  atorvastatin 40 milliGRAM(s) Oral at bedtime    All Other Medications:  influenza   Vaccine 0.5 milliLiter(s) IntraMuscular once  lactated ringers. 1000 milliLiter(s) IV Continuous <Continuous>      Vital Signs Last 24 Hrs  T(C): 37.2 (27 Mar 2024 08:35), Max: 37.8 (26 Mar 2024 15:50)  T(F): 98.9 (27 Mar 2024 08:35), Max: 100.1 (26 Mar 2024 15:50)  HR: 77 (27 Mar 2024 08:35) (77 - 83)  BP: 114/70 (27 Mar 2024 08:35) (74/65 - 120/77)  BP(mean): --  RR: 18 (27 Mar 2024 08:35) (16 - 18)  SpO2: 96% (27 Mar 2024 08:35) (96% - 99%)    Parameters below as of 27 Mar 2024 08:35  Patient On (Oxygen Delivery Method): room air        LABS:                        7.9    11.60 )-----------( 139      ( 26 Mar 2024 05:30 )             24.1     03-26    143  |  109<H>  |  19  ----------------------------<  144<H>  4.5   |  22  |  0.86    Ca    8.7      26 Mar 2024 05:30      PT/INR - ( 25 Mar 2024 14:09 )   PT: 13.8 sec;   INR: 1.22          PTT - ( 25 Mar 2024 14:09 )  PTT:30.6 sec  Urinalysis Basic - ( 26 Mar 2024 05:30 )    Color: x / Appearance: x / SG: x / pH: x  Gluc: 144 mg/dL / Ketone: x  / Bili: x / Urobili: x   Blood: x / Protein: x / Nitrite: x   Leuk Esterase: x / RBC: x / WBC x   Sq Epi: x / Non Sq Epi: x / Bacteria: x        RADIOLOGY:    Drug Screen:        REVIEW OF SYSTEMS:  CONSTITUTIONAL: No fever or fatigue O/N.   EYES: No eye pain, visual disturbances  ENMT:  No difficulty hearing. No throat pain  NECK: No pain or stiffness  RESPIRATORY: No cough, wheezing; No shortness of breath  CARDIOVASCULAR: No chest pain, palpitations.   GASTROINTESTINAL: Pt reports ___ passing gas. No bowel movements. No abdominal or epigastric pain. No nausea, vomiting.   GENITOURINARY: No dysuria, frequency, or incontinence  NEUROLOGICAL: No headaches, loss of strength, numbness, or tremors. No dizzinesss or lightheadedness with pain medications.   MUSCULOSKELETAL: Incisional back pain. No joint pain or swelling; No muscle, or extremity pain      FUNCTIONAL ASSESSMENT:  PAIN SCORE AT REST:         SCALE USED: (1-10 VNRS)  PAIN SCORE WITH ACTIVITY:         SCALE USED: (1-10 VNRS)    PAIN ASSESSMENT:    PHYSICAL EXAM  GENERAL: Laying in bed, NAD  Neuro: CN II-XII PERRRL, EOMI  Cranial nerves grossly intact  Motor exam:  Sensation intact to LT in UE/LE in 3 dermatomes  CHEST/LUNG: Clear to auscultation bilaterally; No rales, rhonchi, wheezing, or rubs  HEART: Regular rate and rhythm; No murmurs, rubs, or gallops  ABDOMEN: Soft, Nontender, Nondistended; Bowel sounds present  EXTREMITIES:  2+ Peripheral Pulses, No clubbing, cyanosis, or edema  SKIN: No rashes or lesions      ASSESSMENT: 64yMale s/p T9-Pelvis PSF by Dr. F. Schwab on 03-25      PLAN:   - continue tylenol 1gm q8h  - continue robaxin 500 q8h  - continue lyrica 50mg TID  - continue PO oxy 5-10mg q4h prn moderate-severe pain, pt educated on use of PRNs      - Home pain regimen: oxy 10 BID PRN    - Bowel regimen: Senna, miralax  - Nausea ppx: Zofran as needed  - Functional Goals: Pt will get OOB with PT today. Pt will resume previous level of activity without impairment from surgery.   - Additional Consults: None recommended.   - Additional Labs/Imaging:  None recommended.     - Follow up, Discharge Planning: per primary team  - Pain Management follow up plan: will continue to follow    Plan d/w Dr. Medina.    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 1000 milliGRAM(s) Oral every 8 hours  methocarbamol 500 milliGRAM(s) Oral every 8 hours  ondansetron   Disintegrating Tablet 4 milliGRAM(s) Oral every 6 hours  oxyCODONE    IR 10 milliGRAM(s) Oral every 4 hours PRN  oxyCODONE    IR 5 milliGRAM(s) Oral every 4 hours PRN  pregabalin 50 milliGRAM(s) Oral three times a day    Heme:  apixaban 5 milliGRAM(s) Oral two times a day  aspirin  chewable 81 milliGRAM(s) Oral daily    Antibiotics:    Cardiovascular:  metoprolol succinate ER 25 milliGRAM(s) Oral daily    GI:  pantoprazole    Tablet 40 milliGRAM(s) Oral before breakfast  polyethylene glycol 3350 17 Gram(s) Oral daily  senna 2 Tablet(s) Oral at bedtime    Endocrine:  atorvastatin 40 milliGRAM(s) Oral at bedtime    All Other Medications:  influenza   Vaccine 0.5 milliLiter(s) IntraMuscular once  lactated ringers. 1000 milliLiter(s) IV Continuous <Continuous>      Vital Signs Last 24 Hrs  T(C): 37.2 (27 Mar 2024 08:35), Max: 37.8 (26 Mar 2024 15:50)  T(F): 98.9 (27 Mar 2024 08:35), Max: 100.1 (26 Mar 2024 15:50)  HR: 77 (27 Mar 2024 08:35) (77 - 83)  BP: 114/70 (27 Mar 2024 08:35) (74/65 - 120/77)  BP(mean): --  RR: 18 (27 Mar 2024 08:35) (16 - 18)  SpO2: 96% (27 Mar 2024 08:35) (96% - 99%)    Parameters below as of 27 Mar 2024 08:35  Patient On (Oxygen Delivery Method): room air        LABS:                        7.9    11.60 )-----------( 139      ( 26 Mar 2024 05:30 )             24.1     03-26    143  |  109<H>  |  19  ----------------------------<  144<H>  4.5   |  22  |  0.86    Ca    8.7      26 Mar 2024 05:30      PT/INR - ( 25 Mar 2024 14:09 )   PT: 13.8 sec;   INR: 1.22          PTT - ( 25 Mar 2024 14:09 )  PTT:30.6 sec  Urinalysis Basic - ( 26 Mar 2024 05:30 )    Color: x / Appearance: x / SG: x / pH: x  Gluc: 144 mg/dL / Ketone: x  / Bili: x / Urobili: x   Blood: x / Protein: x / Nitrite: x   Leuk Esterase: x / RBC: x / WBC x   Sq Epi: x / Non Sq Epi: x / Bacteria: x        RADIOLOGY:    Drug Screen:        REVIEW OF SYSTEMS:  CONSTITUTIONAL: No fever or fatigue O/N.   EYES: No eye pain, visual disturbances  ENMT:  No difficulty hearing. No throat pain  NECK: No pain or stiffness  RESPIRATORY: No cough, wheezing; No shortness of breath  CARDIOVASCULAR: No chest pain, palpitations.   GASTROINTESTINAL: Pt reports passing gas. No bowel movements. No abdominal or epigastric pain. No nausea, vomiting.   GENITOURINARY: No dysuria, frequency, or incontinence  NEUROLOGICAL: No headaches, loss of strength, numbness, or tremors. No dizziness or lightheadedness with pain medications.   MUSCULOSKELETAL: Incisional back pain. No joint pain or swelling; No muscle, or extremity pain      FUNCTIONAL ASSESSMENT:  PAIN SCORE AT REST:         SCALE USED: (1-10 VNRS)  PAIN SCORE WITH ACTIVITY:         SCALE USED: (1-10 VNRS)    PAIN ASSESSMENT:    PHYSICAL EXAM  GENERAL: Laying in bed, NAD  Neuro: CN II-XII PERRRL, EOMI  Cranial nerves grossly intact  Motor exam: GOLDEN  Sensation intact to LT in UE/LE in 3 dermatomes  CHEST/LUNG: Clear to auscultation bilaterally; No rales, rhonchi, wheezing, or rubs  HEART: Regular rate and rhythm; No murmurs, rubs, or gallops  ABDOMEN: Soft, Nontender, Nondistended; Bowel sounds present  EXTREMITIES:  2+ Peripheral Pulses, No clubbing, cyanosis, or edema  SKIN: No rashes or lesions      ASSESSMENT: 64yMale s/p T9-Pelvis PSF by Dr. F. Schwab on 03-25      PLAN:   - continue tylenol 1gm q8h  - increase robaxin 750 q8h  - continue lyrica 50mg TID  - continue PO oxy 5-10mg q4h prn moderate-severe pain, pt educated on use of PRNs  - start IV toradol 30mg q8h x 3 days if no contraindications and OK with surgery    - Home pain regimen: oxy 10 BID PRN    - Bowel regimen: Senna, miralax  - Nausea ppx: Zofran as needed  - Functional Goals: Pt will get OOB with PT today. Pt will resume previous level of activity without impairment from surgery.   - Additional Consults: None recommended.   - Additional Labs/Imaging:  None recommended.     - Follow up, Discharge Planning: per primary team  - Pain Management follow up plan: will continue to follow    Plan d/w Dr. Medina.

## 2024-03-27 NOTE — OCCUPATIONAL THERAPY INITIAL EVALUATION ADULT - PHYSICAL ASSIST/NONPHYSICAL ASSIST:DRESS LOWER BODY, OT EVAL
educated on figure four technique to maintain spinal precautions/verbal cues/nonverbal cues (demo/gestures)/1 person assist

## 2024-03-27 NOTE — DIETITIAN INITIAL EVALUATION ADULT - OTHER CALCULATIONS
Estimated needs based on dosing wt as within % IBW 184lb/83.4kg (120%). Needs adjusted for age, BMI, and status post OR.

## 2024-03-27 NOTE — OCCUPATIONAL THERAPY INITIAL EVALUATION ADULT - ADDITIONAL COMMENTS
Pt lives in spouse in a ranch home with 1STE to first floor with bathroom, approx 10 steps to second level with bedrooms. Pt has walk-in shower with tub bench on first floor, elevated toilet seat, no grab bars. Prior to admission, pt able to complete basic ADLs such as dressing/bathing independently. Pt requiring assist from spouse for IADLs, mostly stays in the home aside from appointments. Pt ambulates with RW, also owns a wheelchair for longer distances. Pt is R hand dominant.

## 2024-03-27 NOTE — DIETITIAN INITIAL EVALUATION ADULT - PERTINENT LABORATORY DATA
03-27    139  |  105  |  19  ----------------------------<  148<H>  3.9   |  28  |  0.87    Ca    8.8      27 Mar 2024 10:00    A1C with Estimated Average Glucose Result: 5.9 % (08-09-23 @ 02:57)

## 2024-03-27 NOTE — DIETITIAN INITIAL EVALUATION ADULT - OTHER INFO
64M with PMH of CAD status post CABG, AFib on eliquis, HTN, prior spine surgeries w chronic pain, R foot drop, here for elective PSFT T9-Pelvis now status post procedure 3/25.    Pt seen on 8WO for assessment. Labs and medication orders reviewed. Ordered for lactated ringers. Electrolytes WNL. On DASH/TLC diet. Pt reports good intake, notes mildly decreased appetite in setting of general hospital environment. Reports UBW consistent with admission wt ~220lb, endorses wt stability since gastric bypass surgery in 08/2023. Pt denies difficulty chewing/swallowing. Denies nausea/vomiting/diarrhea, endorses chronic constipation, last BM 3/24 - bowel regimen ordered. Confirms no known food allergies. No Protestant/ethnic/cultural food preferences noted. No pressure injuries or edema documented, surgical incision noted, Wilfred score 20. See nutrition recommendations. RD to remain available.  0

## 2024-03-27 NOTE — PROGRESS NOTE ADULT - SUBJECTIVE AND OBJECTIVE BOX
Ortho Note    Pt seen and examined with Dr. Schwab.   Pt comfortable without complaints, pain controlled. Patient got dizzy and orthostatic with PT/OT today after standing up.   Denies CP, SOB, N/V, new numbness/tingling.     Vital Signs Last 24 Hrs  T(C): 37.2 (03-27-24 @ 08:35), Max: 37.2 (03-27-24 @ 08:35)  T(F): 98.9 (03-27-24 @ 08:35), Max: 98.9 (03-27-24 @ 08:35)  HR: 77 (03-27-24 @ 08:35) (77 - 77)  BP: 114/70 (03-27-24 @ 08:35) (114/70 - 114/70)  BP(mean): --  RR: 18 (03-27-24 @ 08:35) (18 - 18)  SpO2: 96% (03-27-24 @ 08:35) (96% - 96%)  I&O's Summary    26 Mar 2024 07:01  -  27 Mar 2024 07:00  --------------------------------------------------------  IN: 1800 mL / OUT: 2130 mL / NET: -330 mL    27 Mar 2024 07:01  -  27 Mar 2024 11:52  --------------------------------------------------------  IN: 0 mL / OUT: 320 mL / NET: -320 mL      PE  General: Pt Alert and oriented, NAD  DSG asher C/D/I  CAMILO x1 holding suction  Mckeon  Pulses: +2DP palpable, wwp  Sensation: SILT b/l LE  Motor: R foot drop TA/GS 3/5; L EHL/FHL/TA/GS 5/5                           7.6    8.74  )-----------( 131      ( 27 Mar 2024 10:00 )             23.0     03-27    139  |  105  |  19  ----------------------------<  148<H>  3.9   |  28  |  0.87    Ca    8.8      27 Mar 2024 10:00      A/P: 64yMale POD#2 s/p T9-Pelvis posterior spinal fusion with tether    - Orthostatic with PT/OT and dizzy this AM, Hgb 7.6 (7.9 on 3/26, 9.9 on 3/25) will transfuse 1 unit prbc, active T&S and CBC this evening to assess response   - Medicine Co-management recs appreciated   - Pain Control will transition off PCA  - Mckeon to be removed today, will f/u TOV  - DVT ppx: Eliquis 5mg bid resumed pod1 and asa 81mg qd  - PT, WBS: wbat  - Standing XR when can tolerate   - JPx1 will continue to monitor 140/230, will recheck at 2pm   DISPO: ROBIN Burnett Pager [phone]

## 2024-03-27 NOTE — OCCUPATIONAL THERAPY INITIAL EVALUATION ADULT - PHYSICAL ASSIST/NONPHYSICAL ASSIST: SIT/SUPINE, REHAB EVAL
assist to lift BLEs, Educated on log roll technique in order to maintain spinal precautions./verbal cues/nonverbal cues (demo/gestures)/1 person assist

## 2024-03-27 NOTE — CHART NOTE - NSCHARTNOTEFT_GEN_A_CORE
Patient s/p T9 to pelvis posterior spinal fusion.   Patient able to tolerate 3 hours of physical therapy at acute rehab.     Rosenda Em PA-C

## 2024-03-27 NOTE — PROGRESS NOTE ADULT - SUBJECTIVE AND OBJECTIVE BOX
INTERVAL HPI/OVERNIGHT EVENTS: brandon o/n    SUBJECTIVE: Patient seen and examined at bedside.   Feels a bit tired this morning. Noted to be lightheaded when standing w PT today.   Eating wo N/V/Abd pain. +flatus wo BM. Mckeon in place. No chest pain, dyspnea, fevers. Feels pain is controlled.     OBJECTIVE:    VITAL SIGNS:  ICU Vital Signs Last 24 Hrs  T(C): 37.2 (27 Mar 2024 08:35), Max: 37.8 (26 Mar 2024 15:50)  T(F): 98.9 (27 Mar 2024 08:35), Max: 100.1 (26 Mar 2024 15:50)  HR: 77 (27 Mar 2024 08:35) (77 - 79)  BP: 114/70 (27 Mar 2024 08:35) (104/65 - 120/77)  BP(mean): --  ABP: --  ABP(mean): --  RR: 18 (27 Mar 2024 08:35) (16 - 18)  SpO2: 96% (27 Mar 2024 08:35) (96% - 99%)    O2 Parameters below as of 27 Mar 2024 08:35  Patient On (Oxygen Delivery Method): room air              03-26 @ 07:01 - 03-27 @ 07:00  --------------------------------------------------------  IN: 1800 mL / OUT: 2130 mL / NET: -330 mL    03-27 @ 07:01 - 03-27 @ 12:00  --------------------------------------------------------  IN: 0 mL / OUT: 320 mL / NET: -320 mL      CAPILLARY BLOOD GLUCOSE          PHYSICAL EXAM:  GEN: Male in NAD on RA  HEENT: NC/AT, MMM  CV: RRR, nml S1S2, no murmurs  PULM: nml effort, CTAB  ABD: Soft, non-distended, NABS, non-tender  NEURO  A/O x3, moving all extremities, Sensation intact.4+/5 in R foot plantarfflexion - chronic. Otherwise 5/5  Lumbar dressing c/d/i  PSYCH: Appropriate    MEDICATIONS:  MEDICATIONS  (STANDING):  acetaminophen     Tablet .. 1000 milliGRAM(s) Oral every 8 hours  apixaban 5 milliGRAM(s) Oral two times a day  aspirin  chewable 81 milliGRAM(s) Oral daily  atorvastatin 40 milliGRAM(s) Oral at bedtime  influenza   Vaccine 0.5 milliLiter(s) IntraMuscular once  lactated ringers. 1000 milliLiter(s) (100 mL/Hr) IV Continuous <Continuous>  methocarbamol 500 milliGRAM(s) Oral every 8 hours  metoprolol succinate ER 25 milliGRAM(s) Oral daily  ondansetron   Disintegrating Tablet 4 milliGRAM(s) Oral every 6 hours  pantoprazole    Tablet 40 milliGRAM(s) Oral before breakfast  polyethylene glycol 3350 17 Gram(s) Oral daily  pregabalin 50 milliGRAM(s) Oral three times a day  senna 2 Tablet(s) Oral at bedtime    MEDICATIONS  (PRN):  oxyCODONE    IR 5 milliGRAM(s) Oral every 4 hours PRN Moderate Pain (4 - 6)  oxyCODONE    IR 10 milliGRAM(s) Oral every 4 hours PRN Severe Pain (7 - 10)      ALLERGIES:  Allergies    Allergy Status Unknown    Intolerances        LABS:                        7.6    8.74  )-----------( 131      ( 27 Mar 2024 10:00 )             23.0     03-27    139  |  105  |  19  ----------------------------<  148<H>  3.9   |  28  |  0.87    Ca    8.8      27 Mar 2024 10:00      PT/INR - ( 25 Mar 2024 14:09 )   PT: 13.8 sec;   INR: 1.22          PTT - ( 25 Mar 2024 14:09 )  PTT:30.6 sec  Urinalysis Basic - ( 27 Mar 2024 10:00 )    Color: x / Appearance: x / SG: x / pH: x  Gluc: 148 mg/dL / Ketone: x  / Bili: x / Urobili: x   Blood: x / Protein: x / Nitrite: x   Leuk Esterase: x / RBC: x / WBC x   Sq Epi: x / Non Sq Epi: x / Bacteria: x        RADIOLOGY & ADDITIONAL TESTS: Reviewed.

## 2024-03-27 NOTE — DIETITIAN INITIAL EVALUATION ADULT - PERTINENT MEDS FT
MEDICATIONS  (STANDING):  acetaminophen     Tablet .. 1000 milliGRAM(s) Oral every 8 hours  apixaban 5 milliGRAM(s) Oral two times a day  aspirin  chewable 81 milliGRAM(s) Oral daily  atorvastatin 40 milliGRAM(s) Oral at bedtime  influenza   Vaccine 0.5 milliLiter(s) IntraMuscular once  lactated ringers. 1000 milliLiter(s) (100 mL/Hr) IV Continuous <Continuous>  methocarbamol 750 milliGRAM(s) Oral every 8 hours  metoprolol succinate ER 25 milliGRAM(s) Oral daily  ondansetron   Disintegrating Tablet 4 milliGRAM(s) Oral every 6 hours  pantoprazole    Tablet 40 milliGRAM(s) Oral before breakfast  polyethylene glycol 3350 17 Gram(s) Oral daily  pregabalin 50 milliGRAM(s) Oral three times a day  senna 2 Tablet(s) Oral at bedtime    MEDICATIONS  (PRN):  oxyCODONE    IR 5 milliGRAM(s) Oral every 4 hours PRN Moderate Pain (4 - 6)  oxyCODONE    IR 10 milliGRAM(s) Oral every 4 hours PRN Severe Pain (7 - 10)

## 2024-03-27 NOTE — OCCUPATIONAL THERAPY INITIAL EVALUATION ADULT - MANUAL MUSCLE TESTING RESULTS, REHAB EVAL
BUE not formally assessed in order to maintain spinal precautions, however, BUE grossly > or equal to 3+/5 throughout in functional assessment against gravity. LLE hip flexion 3+/5, LLE knee flexion/extension grossly 3+/5, LLE ankle DF/PF grossly 3+/5. RLE hip flexion grossly 4-/5, RLE knee flexion/extension grossly 3+/5, RLE ankle DF grossly 3-/5, RLE ankle PF grossly 2/5.

## 2024-03-27 NOTE — OCCUPATIONAL THERAPY INITIAL EVALUATION ADULT - DIAGNOSIS, OT EVAL
Pt presents with L knee buckling, R ankle PF weakness, decreased sensation on RLE, decreased functional endurance, and decreased BLE coordination impacting their ability to independently complete ADLs, functional transfers, and functional mobility.

## 2024-03-27 NOTE — DIETITIAN INITIAL EVALUATION ADULT - NUTRITION CONSULT
no Xelkelliz Pregnancy And Lactation Text: This medication is Pregnancy Category D and is not considered safe during pregnancy.  The risk during breast feeding is also uncertain.

## 2024-03-27 NOTE — PROGRESS NOTE ADULT - SUBJECTIVE AND OBJECTIVE BOX
Ortho Progress Note    Procedure: T9-Pelvis PSF on 3/25  Surgeon: Dr. F. Schwab    Pt comfortable without complaints, pain controlled  Denies CP, SOB, N/V, numbness/tingling     Vital Signs Last 24 Hrs  T(C): 37.4 (27 Mar 2024 05:17), Max: 37.8 (26 Mar 2024 15:50)  T(F): 99.4 (27 Mar 2024 05:17), Max: 100.1 (26 Mar 2024 15:50)  HR: 77 (27 Mar 2024 05:17) (68 - 83)  BP: 120/77 (27 Mar 2024 05:17) (74/65 - 120/77)  BP(mean): --  RR: 16 (27 Mar 2024 05:17) (16 - 17)  SpO2: 96% (27 Mar 2024 05:17) (96% - 99%)    Parameters below as of 27 Mar 2024 05:17  Patient On (Oxygen Delivery Method): room air    General: Pt Alert and oriented, NAD  DSG C/D/I  Drains: JPx1  Pulses: 2+ DP  Sensation: SILT grossly L2-S1 bilaterally  Motor: 5/5 L2-S1 bilaterally    A/P: 64yMale s/p T9-Pelvis PSF by Dr. F. Schwab on 03-25  - Stable  - Pain Control  - DVT ppx: SCDs, Home Eliquis   - Post op abx: Ancef  - Drains: JPx1 140/230  - WBS: WBAT  - AR

## 2024-03-27 NOTE — DIETITIAN INITIAL EVALUATION ADULT - EDUCATION DIETARY MODIFICATIONS
Educated on post-op nutrition therapy and discussed small frequent meals in setting of recent bariatric surgery. Pt aware RD remains available for additional questions/concerns./(2) meets goals/outcomes/verbalization

## 2024-03-27 NOTE — OCCUPATIONAL THERAPY INITIAL EVALUATION ADULT - PERTINENT HX OF CURRENT PROBLEM, REHAB EVAL
The patient is a 64 year old male who Presents today for elective posterior spinal fusion with instrumentation T9-Pelvis, lemos wing osteotomies, decompression T10-T11, T12-L1, L1-L4 and L5-S1, iliac fixation, tethers with Dr. Schwab.Of note has had 2 previous lumbar spine surgeries (per chart review lumbar laminectomy in 2003 and T10-L3 lami by Dr Anne in 2015). Has chronic pain but pain/symptoms rapidly worsened in february 2024. Ambulates with assistance of a walker. Takes oxycodone for his pain. Of note has history of afib on eliquis, and per chart review history of triple bypass surgery for which he was in cardiac rehab for 6 weeks.

## 2024-03-27 NOTE — OCCUPATIONAL THERAPY INITIAL EVALUATION ADULT - GENERAL OBSERVATIONS, REHAB EVAL
OT IE Completed. Pt's RORO Pettit cleared pt for OT. Pt received semisupine in bed, +spinal CAMILO drain, +b/l SCDs, +vega, room air, NAD, agreeable to OT. Pt pre-medicated with oxycodone ~1 hr prior to session. Pt tolerated session poorly as pt with symptomatic orthostatic hypotension. Pt left as found, all lines in tact, needs in reach, +bed alarm. RORO Pettit aware.

## 2024-03-28 ENCOUNTER — TRANSCRIPTION ENCOUNTER (OUTPATIENT)
Age: 64
End: 2024-03-28

## 2024-03-28 LAB
ANION GAP SERPL CALC-SCNC: 8 MMOL/L — SIGNIFICANT CHANGE UP (ref 5–17)
BUN SERPL-MCNC: 17 MG/DL — SIGNIFICANT CHANGE UP (ref 7–23)
CALCIUM SERPL-MCNC: 9 MG/DL — SIGNIFICANT CHANGE UP (ref 8.4–10.5)
CHLORIDE SERPL-SCNC: 103 MMOL/L — SIGNIFICANT CHANGE UP (ref 96–108)
CO2 SERPL-SCNC: 26 MMOL/L — SIGNIFICANT CHANGE UP (ref 22–31)
CREAT SERPL-MCNC: 0.88 MG/DL — SIGNIFICANT CHANGE UP (ref 0.5–1.3)
EGFR: 96 ML/MIN/1.73M2 — SIGNIFICANT CHANGE UP
GLUCOSE SERPL-MCNC: 119 MG/DL — HIGH (ref 70–99)
HCT VFR BLD CALC: 25.7 % — LOW (ref 39–50)
HGB BLD-MCNC: 8.5 G/DL — LOW (ref 13–17)
MCHC RBC-ENTMCNC: 29.7 PG — SIGNIFICANT CHANGE UP (ref 27–34)
MCHC RBC-ENTMCNC: 33.1 GM/DL — SIGNIFICANT CHANGE UP (ref 32–36)
MCV RBC AUTO: 89.9 FL — SIGNIFICANT CHANGE UP (ref 80–100)
NRBC # BLD: 0 /100 WBCS — SIGNIFICANT CHANGE UP (ref 0–0)
PLATELET # BLD AUTO: 134 K/UL — LOW (ref 150–400)
POTASSIUM SERPL-MCNC: 4.2 MMOL/L — SIGNIFICANT CHANGE UP (ref 3.5–5.3)
POTASSIUM SERPL-SCNC: 4.2 MMOL/L — SIGNIFICANT CHANGE UP (ref 3.5–5.3)
RBC # BLD: 2.86 M/UL — LOW (ref 4.2–5.8)
RBC # FLD: 14.9 % — HIGH (ref 10.3–14.5)
SODIUM SERPL-SCNC: 137 MMOL/L — SIGNIFICANT CHANGE UP (ref 135–145)
WBC # BLD: 6.73 K/UL — SIGNIFICANT CHANGE UP (ref 3.8–10.5)
WBC # FLD AUTO: 6.73 K/UL — SIGNIFICANT CHANGE UP (ref 3.8–10.5)

## 2024-03-28 PROCEDURE — 99233 SBSQ HOSP IP/OBS HIGH 50: CPT

## 2024-03-28 PROCEDURE — 72082 X-RAY EXAM ENTIRE SPI 2/3 VW: CPT | Mod: 26

## 2024-03-28 RX ADMIN — OXYCODONE HYDROCHLORIDE 10 MILLIGRAM(S): 5 TABLET ORAL at 14:53

## 2024-03-28 RX ADMIN — Medication 30 MILLIGRAM(S): at 06:36

## 2024-03-28 RX ADMIN — Medication 50 MILLIGRAM(S): at 02:00

## 2024-03-28 RX ADMIN — SENNA PLUS 2 TABLET(S): 8.6 TABLET ORAL at 21:45

## 2024-03-28 RX ADMIN — OXYCODONE HYDROCHLORIDE 10 MILLIGRAM(S): 5 TABLET ORAL at 05:36

## 2024-03-28 RX ADMIN — Medication 1000 MILLIGRAM(S): at 14:46

## 2024-03-28 RX ADMIN — OXYCODONE HYDROCHLORIDE 10 MILLIGRAM(S): 5 TABLET ORAL at 01:07

## 2024-03-28 RX ADMIN — OXYCODONE HYDROCHLORIDE 10 MILLIGRAM(S): 5 TABLET ORAL at 06:36

## 2024-03-28 RX ADMIN — OXYCODONE HYDROCHLORIDE 10 MILLIGRAM(S): 5 TABLET ORAL at 13:53

## 2024-03-28 RX ADMIN — Medication 30 MILLIGRAM(S): at 13:46

## 2024-03-28 RX ADMIN — POLYETHYLENE GLYCOL 3350 17 GRAM(S): 17 POWDER, FOR SOLUTION ORAL at 13:45

## 2024-03-28 RX ADMIN — Medication 30 MILLIGRAM(S): at 22:44

## 2024-03-28 RX ADMIN — APIXABAN 5 MILLIGRAM(S): 2.5 TABLET, FILM COATED ORAL at 17:59

## 2024-03-28 RX ADMIN — OXYCODONE HYDROCHLORIDE 10 MILLIGRAM(S): 5 TABLET ORAL at 22:02

## 2024-03-28 RX ADMIN — OXYCODONE HYDROCHLORIDE 10 MILLIGRAM(S): 5 TABLET ORAL at 10:49

## 2024-03-28 RX ADMIN — Medication 1000 MILLIGRAM(S): at 22:44

## 2024-03-28 RX ADMIN — Medication 30 MILLIGRAM(S): at 14:46

## 2024-03-28 RX ADMIN — METHOCARBAMOL 750 MILLIGRAM(S): 500 TABLET, FILM COATED ORAL at 13:46

## 2024-03-28 RX ADMIN — METHOCARBAMOL 750 MILLIGRAM(S): 500 TABLET, FILM COATED ORAL at 21:45

## 2024-03-28 RX ADMIN — Medication 1000 MILLIGRAM(S): at 13:46

## 2024-03-28 RX ADMIN — Medication 30 MILLIGRAM(S): at 21:45

## 2024-03-28 RX ADMIN — Medication 30 MILLIGRAM(S): at 05:36

## 2024-03-28 RX ADMIN — OXYCODONE HYDROCHLORIDE 10 MILLIGRAM(S): 5 TABLET ORAL at 23:02

## 2024-03-28 RX ADMIN — OXYCODONE HYDROCHLORIDE 10 MILLIGRAM(S): 5 TABLET ORAL at 17:59

## 2024-03-28 RX ADMIN — Medication 81 MILLIGRAM(S): at 13:46

## 2024-03-28 RX ADMIN — ATORVASTATIN CALCIUM 40 MILLIGRAM(S): 80 TABLET, FILM COATED ORAL at 21:45

## 2024-03-28 RX ADMIN — OXYCODONE HYDROCHLORIDE 10 MILLIGRAM(S): 5 TABLET ORAL at 18:59

## 2024-03-28 RX ADMIN — Medication 1000 MILLIGRAM(S): at 21:44

## 2024-03-28 RX ADMIN — Medication 1000 MILLIGRAM(S): at 05:36

## 2024-03-28 RX ADMIN — METHOCARBAMOL 750 MILLIGRAM(S): 500 TABLET, FILM COATED ORAL at 05:36

## 2024-03-28 RX ADMIN — OXYCODONE HYDROCHLORIDE 10 MILLIGRAM(S): 5 TABLET ORAL at 00:07

## 2024-03-28 RX ADMIN — OXYCODONE HYDROCHLORIDE 10 MILLIGRAM(S): 5 TABLET ORAL at 09:49

## 2024-03-28 RX ADMIN — PANTOPRAZOLE SODIUM 40 MILLIGRAM(S): 20 TABLET, DELAYED RELEASE ORAL at 06:00

## 2024-03-28 RX ADMIN — APIXABAN 5 MILLIGRAM(S): 2.5 TABLET, FILM COATED ORAL at 05:36

## 2024-03-28 RX ADMIN — Medication 1000 MILLIGRAM(S): at 06:36

## 2024-03-28 NOTE — DISCHARGE NOTE PROVIDER - HOSPITAL COURSE
Admitted:   Surgery: s/p T9- Pelvis PSF  Dana-op Antibiotics:  Pain control  DVT prophylaxis:  OOB/Physical Therapy  Consultants: Medicine  Inpatient events:  3-26 - requesting to keep a vega   3-27- Hgb 7.6, received x1 unit PRBCS improved to 8.5  3-28 standing xrays completed 3-28   Admitted: 3-  Surgery: s/p T9- Pelvis PSF  Dana-op Antibiotics:  Pain control  DVT prophylaxis:  OOB/Physical Therapy  Consultants: Medicine  Inpatient events:  3-26 - requesting to keep a vega   3-27- Hgb 7.6, received x1 unit PRBCS improved to 8.5  3-28 standing xrays completed 3-28, dcd lyrica   3-29 Loyd dcd, restarted home losartan 50mg PO daily can go up to 100mg po is bp is not well controlled , dcd methocarbamol

## 2024-03-28 NOTE — PROGRESS NOTE ADULT - SUBJECTIVE AND OBJECTIVE BOX
Ortho Note    Subjective:  Pt seen and examined today, patient reporting surgical site pain controlled with current pain medication. Patient reports he feels sleepy on lyrica po . Discussed plan to dc lyrica PO   Denies CP, SOB, N/V, numbness/tingling   Reviewed plan of care with patient at bedside    Vital Signs Last 24 Hrs  T(C): 36.7 (03-28-24 @ 09:00), Max: 37.3 (03-28-24 @ 05:39)  T(F): 98.1 (03-28-24 @ 09:00), Max: 99.1 (03-28-24 @ 05:39)  HR: 74 (03-28-24 @ 09:00) (74 - 81)  BP: 117/74 (03-28-24 @ 09:00) (117/74 - 125/78)  BP(mean): 88 (03-28-24 @ 09:00) (88 - 88)  RR: 16 (03-28-24 @ 09:00) (16 - 17)  SpO2: 98% (03-28-24 @ 09:00) (98% - 98%)  AVSS    Objective:    Physical Exam:  General: Pt Alert and oriented, NAD  thoracic/Lumbar Prineo dressing, Camilo x1   Pulses: +2 pedal pulses,   Sensation: silt intact bilateral lower extremities  Motor: EHL/FHL/TA/GS-  5/5 LLE  RLE ehl- 3/5  /fhl - 3/5  TA-tibialis posterior  3/5  tibialis posterior /Gastroc- 3/5         Plan of Care:  A/P: 64yMale s/p T9-Pelvis PSF by Dr. F. Schwab on 03-25  - afebrile, wbcs 6.73  - Pain Control- DC pca,  tylenol 1000mg PO Q8h, methocarbamol 500mg PO Q8h, Oxycodone 5-10mg PO Q4h prn moderate to severe pain, dc lyrica 50mg PO TID   - DVT ppx: apixaban 5mg PO BID, asa 81mg PO daily   - PT, WBS: WBAT  - appreciate medicine recs  - appreciate pain management recs  - bowel regimen, IS use, PPI   - continue CAMILO  - standing xray today 3-28    - Dispo- AR, pending auth and acceptance, dc of drain, standing xrays     Ortho Pager [phone] Ortho Note    Subjective:  Pt seen and examined today, patient reporting surgical site pain controlled with current pain medication. Patient reports he feels sleepy on lyrica po . Discussed plan to dc lyrica PO   Denies CP, SOB, N/V, numbness/tingling   Reviewed plan of care with patient at bedside    Vital Signs Last 24 Hrs  T(C): 36.7 (03-28-24 @ 09:00), Max: 37.3 (03-28-24 @ 05:39)  T(F): 98.1 (03-28-24 @ 09:00), Max: 99.1 (03-28-24 @ 05:39)  HR: 74 (03-28-24 @ 09:00) (74 - 81)  BP: 117/74 (03-28-24 @ 09:00) (117/74 - 125/78)  BP(mean): 88 (03-28-24 @ 09:00) (88 - 88)  RR: 16 (03-28-24 @ 09:00) (16 - 17)  SpO2: 98% (03-28-24 @ 09:00) (98% - 98%)  AVSS    Objective:    Physical Exam:  General: Pt Alert and oriented, NAD  thoracic/Lumbar Prineo dressing, Camilo x1   Pulses: +2 pedal pulses,   Sensation: silt intact bilateral lower extremities  Motor: EHL/FHL/TA/GS-  5/5 LLE  RLE ehl- 3/5  /fhl - 3/5  TA-tibialis posterior  3/5  tibialis posterior /Gastroc- 3/5         Plan of Care:  A/P: 64yMale s/p T9-Pelvis PSF by Dr. F. Schwab on 03-25  - afebrile, wbcs 6.73  - Pain Control- DC pca,  tylenol 1000mg PO Q8h, methocarbamol 750mg PO Q8h, Oxycodone 5-10mg PO Q4h prn moderate to severe pain, dc lyrica 50mg PO TID   - DVT ppx: apixaban 5mg PO BID, asa 81mg PO daily   - PT, WBS: WBAT  - appreciate medicine recs  - appreciate pain management recs  - bowel regimen, IS use, PPI   - continue CAMILO  - standing xray today 3-28    - Dispo- AR, pending auth and acceptance, dc of drain, standing xrays     Ortho Pager [phone]

## 2024-03-28 NOTE — PROGRESS NOTE ADULT - SUBJECTIVE AND OBJECTIVE BOX
INTERVAL HPI/OVERNIGHT EVENTS: brandon o/n    SUBJECTIVE: Patient seen and examined at bedside.   Pt felt better walking w PT using a walker today. No further lightheadedness or dizziness. Feels energy level is improved. No chest pain, dyspnea. Eating wo N/V/Abd pain. Pain in his back is controlled. Voiding wo dysuria. +flatus. No fevers.    OBJECTIVE:    VITAL SIGNS:  ICU Vital Signs Last 24 Hrs  T(C): 36.7 (28 Mar 2024 09:00), Max: 37.3 (27 Mar 2024 15:51)  T(F): 98.1 (28 Mar 2024 09:00), Max: 99.1 (27 Mar 2024 15:51)  HR: 74 (28 Mar 2024 09:00) (70 - 82)  BP: 117/74 (28 Mar 2024 09:00) (117/74 - 126/74)  BP(mean): 88 (28 Mar 2024 09:00) (88 - 88)  ABP: --  ABP(mean): --  RR: 16 (28 Mar 2024 09:00) (16 - 18)  SpO2: 98% (28 Mar 2024 09:00) (95% - 98%)    O2 Parameters below as of 28 Mar 2024 09:00  Patient On (Oxygen Delivery Method): room air              03-27 @ 07:01 - 03-28 @ 07:00  --------------------------------------------------------  IN: 1680 mL / OUT: 1587 mL / NET: 93 mL    03-28 @ 07:01 - 03-28 @ 14:34  --------------------------------------------------------  IN: 0 mL / OUT: 325 mL / NET: -325 mL      CAPILLARY BLOOD GLUCOSE          PHYSICAL EXAM:  GEN: Male in NAD on RA  HEENT: NC/AT, MMM  CV: RRR, nml S1S2, no murmurs  PULM: nml effort, CTAB  ABD: Soft, non-distended, NABS, non-tender  NEURO  A/O x3, moving all extremities, Sensation intact.4+/5 in R foot plantarfflexion - chronic. Otherwise 5/5  Lumbar dressing c/d/i  PSYCH: Appropriate      MEDICATIONS:  MEDICATIONS  (STANDING):  acetaminophen     Tablet .. 1000 milliGRAM(s) Oral every 8 hours  apixaban 5 milliGRAM(s) Oral two times a day  aspirin  chewable 81 milliGRAM(s) Oral daily  atorvastatin 40 milliGRAM(s) Oral at bedtime  influenza   Vaccine 0.5 milliLiter(s) IntraMuscular once  ketorolac   Injectable 30 milliGRAM(s) IV Push every 8 hours  lactated ringers. 1000 milliLiter(s) (100 mL/Hr) IV Continuous <Continuous>  methocarbamol 750 milliGRAM(s) Oral every 8 hours  metoprolol succinate ER 25 milliGRAM(s) Oral daily  ondansetron   Disintegrating Tablet 4 milliGRAM(s) Oral every 6 hours  pantoprazole    Tablet 40 milliGRAM(s) Oral before breakfast  polyethylene glycol 3350 17 Gram(s) Oral daily  senna 2 Tablet(s) Oral at bedtime    MEDICATIONS  (PRN):  oxyCODONE    IR 5 milliGRAM(s) Oral every 4 hours PRN Moderate Pain (4 - 6)  oxyCODONE    IR 10 milliGRAM(s) Oral every 4 hours PRN Severe Pain (7 - 10)      ALLERGIES:  Allergies    Allergy Status Unknown    Intolerances        LABS:                        8.5    6.73  )-----------( 134      ( 28 Mar 2024 05:30 )             25.7     03-28    137  |  103  |  17  ----------------------------<  119<H>  4.2   |  26  |  0.88    Ca    9.0      28 Mar 2024 05:30        Urinalysis Basic - ( 28 Mar 2024 05:30 )    Color: x / Appearance: x / SG: x / pH: x  Gluc: 119 mg/dL / Ketone: x  / Bili: x / Urobili: x   Blood: x / Protein: x / Nitrite: x   Leuk Esterase: x / RBC: x / WBC x   Sq Epi: x / Non Sq Epi: x / Bacteria: x        RADIOLOGY & ADDITIONAL TESTS: Reviewed.

## 2024-03-28 NOTE — PROGRESS NOTE ADULT - SUBJECTIVE AND OBJECTIVE BOX
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 1000 milliGRAM(s) Oral every 8 hours  ketorolac   Injectable 30 milliGRAM(s) IV Push every 8 hours  methocarbamol 750 milliGRAM(s) Oral every 8 hours  ondansetron   Disintegrating Tablet 4 milliGRAM(s) Oral every 6 hours  oxyCODONE    IR 10 milliGRAM(s) Oral every 4 hours PRN  oxyCODONE    IR 5 milliGRAM(s) Oral every 4 hours PRN  pregabalin 50 milliGRAM(s) Oral three times a day    Heme:  apixaban 5 milliGRAM(s) Oral two times a day  aspirin  chewable 81 milliGRAM(s) Oral daily    Antibiotics:    Cardiovascular:  metoprolol succinate ER 25 milliGRAM(s) Oral daily    GI:  pantoprazole    Tablet 40 milliGRAM(s) Oral before breakfast  polyethylene glycol 3350 17 Gram(s) Oral daily  senna 2 Tablet(s) Oral at bedtime    Endocrine:  atorvastatin 40 milliGRAM(s) Oral at bedtime    All Other Medications:  influenza   Vaccine 0.5 milliLiter(s) IntraMuscular once  lactated ringers. 1000 milliLiter(s) IV Continuous <Continuous>      Vital Signs Last 24 Hrs  T(C): 37.3 (28 Mar 2024 05:39), Max: 37.3 (27 Mar 2024 15:51)  T(F): 99.1 (28 Mar 2024 05:39), Max: 99.1 (27 Mar 2024 15:51)  HR: 81 (28 Mar 2024 05:39) (70 - 82)  BP: 125/78 (28 Mar 2024 05:39) (114/70 - 134/73)  BP(mean): --  RR: 17 (28 Mar 2024 05:39) (16 - 18)  SpO2: 98% (28 Mar 2024 05:39) (95% - 98%)    Parameters below as of 28 Mar 2024 05:39  Patient On (Oxygen Delivery Method): room air        LABS:                        8.5    6.73  )-----------( 134      ( 28 Mar 2024 05:30 )             25.7     03-28    137  |  103  |  17  ----------------------------<  119<H>  4.2   |  26  |  0.88    Ca    9.0      28 Mar 2024 05:30        Urinalysis Basic - ( 28 Mar 2024 05:30 )    Color: x / Appearance: x / SG: x / pH: x  Gluc: 119 mg/dL / Ketone: x  / Bili: x / Urobili: x   Blood: x / Protein: x / Nitrite: x   Leuk Esterase: x / RBC: x / WBC x   Sq Epi: x / Non Sq Epi: x / Bacteria: x        RADIOLOGY:    Drug Screen:        REVIEW OF SYSTEMS:  CONSTITUTIONAL: No fever or fatigue O/N.   EYES: No eye pain, visual disturbances  ENMT:  No difficulty hearing. No throat pain  NECK: No pain or stiffness  RESPIRATORY: No cough, wheezing; No shortness of breath  CARDIOVASCULAR: No chest pain, palpitations.   GASTROINTESTINAL: Pt reports passing gas. No bowel movements. No abdominal or epigastric pain. No nausea, vomiting.   GENITOURINARY: No dysuria, frequency, or incontinence  NEUROLOGICAL: No headaches, loss of strength, numbness, or tremors. No dizzinesss or lightheadedness with pain medications.   MUSCULOSKELETAL: Incisional back pain. No joint pain or swelling; No muscle, or extremity pain      FUNCTIONAL ASSESSMENT:  PAIN SCORE AT REST:         SCALE USED: (1-10 VNRS)  PAIN SCORE WITH ACTIVITY:         SCALE USED: (1-10 VNRS)    PAIN ASSESSMENT:    PHYSICAL EXAM  GENERAL: Laying in bed, NAD  Neuro: CN II-XII PERRRL, EOMI  Cranial nerves grossly intact  Motor exam: GOLDEN  Sensation intact to LT in UE/LE in 3 dermatomes  CHEST/LUNG: Clear to auscultation bilaterally; No rales, rhonchi, wheezing, or rubs  HEART: Regular rate and rhythm; No murmurs, rubs, or gallops  ABDOMEN: Soft, Nontender, Nondistended; Bowel sounds present  EXTREMITIES:  2+ Peripheral Pulses, No clubbing, cyanosis, or edema  SKIN: No rashes or lesions      ASSESSMENT:  64yMale s/p T9-Pelvis PSF by Dr. F. Schwab on 03-25    PLAN:   - continue tylenol 1gm q8h  - continue robaxin 750 q8h  - continue lyrica 50mg TID  - continue PO oxy 5-10mg q4h prn moderate-severe pain, pt educated on use of PRNs  - *** IV toradol 30mg q8h x 3 days if no contraindications and OK with surgery    - Home pain regimen: oxy 10 BID PRN    - Bowel regimen: Senna, miralax  - Nausea ppx: Zofran as needed  - Functional Goals: Pt will get OOB with PT today. Pt will resume previous level of activity without impairment from surgery.   - Additional Consults: None recommended.   - Additional Labs/Imaging:  None recommended.     - Follow up, Discharge Planning: per primary team  - Pain Management follow up plan: will continue to follow    Plan d/w Dr. Medina.        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 1000 milliGRAM(s) Oral every 8 hours  ketorolac   Injectable 30 milliGRAM(s) IV Push every 8 hours  methocarbamol 750 milliGRAM(s) Oral every 8 hours  ondansetron   Disintegrating Tablet 4 milliGRAM(s) Oral every 6 hours  oxyCODONE    IR 10 milliGRAM(s) Oral every 4 hours PRN  oxyCODONE    IR 5 milliGRAM(s) Oral every 4 hours PRN  pregabalin 50 milliGRAM(s) Oral three times a day    Heme:  apixaban 5 milliGRAM(s) Oral two times a day  aspirin  chewable 81 milliGRAM(s) Oral daily    Antibiotics:    Cardiovascular:  metoprolol succinate ER 25 milliGRAM(s) Oral daily    GI:  pantoprazole    Tablet 40 milliGRAM(s) Oral before breakfast  polyethylene glycol 3350 17 Gram(s) Oral daily  senna 2 Tablet(s) Oral at bedtime    Endocrine:  atorvastatin 40 milliGRAM(s) Oral at bedtime    All Other Medications:  influenza   Vaccine 0.5 milliLiter(s) IntraMuscular once  lactated ringers. 1000 milliLiter(s) IV Continuous <Continuous>      Vital Signs Last 24 Hrs  T(C): 37.3 (28 Mar 2024 05:39), Max: 37.3 (27 Mar 2024 15:51)  T(F): 99.1 (28 Mar 2024 05:39), Max: 99.1 (27 Mar 2024 15:51)  HR: 81 (28 Mar 2024 05:39) (70 - 82)  BP: 125/78 (28 Mar 2024 05:39) (114/70 - 134/73)  BP(mean): --  RR: 17 (28 Mar 2024 05:39) (16 - 18)  SpO2: 98% (28 Mar 2024 05:39) (95% - 98%)    Parameters below as of 28 Mar 2024 05:39  Patient On (Oxygen Delivery Method): room air        LABS:                        8.5    6.73  )-----------( 134      ( 28 Mar 2024 05:30 )             25.7     03-28    137  |  103  |  17  ----------------------------<  119<H>  4.2   |  26  |  0.88    Ca    9.0      28 Mar 2024 05:30        Urinalysis Basic - ( 28 Mar 2024 05:30 )    Color: x / Appearance: x / SG: x / pH: x  Gluc: 119 mg/dL / Ketone: x  / Bili: x / Urobili: x   Blood: x / Protein: x / Nitrite: x   Leuk Esterase: x / RBC: x / WBC x   Sq Epi: x / Non Sq Epi: x / Bacteria: x        RADIOLOGY:    Drug Screen:        REVIEW OF SYSTEMS:  CONSTITUTIONAL: No fever or fatigue O/N.   EYES: No eye pain, visual disturbances  ENMT:  No difficulty hearing. No throat pain  NECK: No pain or stiffness  RESPIRATORY: No cough, wheezing; No shortness of breath  CARDIOVASCULAR: No chest pain, palpitations.   GASTROINTESTINAL: Pt reports passing gas. No bowel movements. No abdominal or epigastric pain. No nausea, vomiting.   GENITOURINARY: No dysuria, frequency, or incontinence  NEUROLOGICAL: No headaches, loss of strength, numbness, or tremors. No dizzinesss or lightheadedness with pain medications.   MUSCULOSKELETAL: Incisional back pain. No joint pain or swelling; No muscle, or extremity pain      FUNCTIONAL ASSESSMENT:  PAIN SCORE AT REST:         SCALE USED: (1-10 VNRS)  PAIN SCORE WITH ACTIVITY:         SCALE USED: (1-10 VNRS)    PAIN ASSESSMENT:    PHYSICAL EXAM  GENERAL: Laying in bed, NAD  Neuro: CN II-XII PERRRL, EOMI  Cranial nerves grossly intact  Motor exam: GOLDEN  Sensation intact to LT in UE/LE in 3 dermatomes  CHEST/LUNG: Clear to auscultation bilaterally; No rales, rhonchi, wheezing, or rubs  HEART: Regular rate and rhythm; No murmurs, rubs, or gallops  ABDOMEN: Soft, Nontender, Nondistended; Bowel sounds present  EXTREMITIES:  2+ Peripheral Pulses, No clubbing, cyanosis, or edema  SKIN: No rashes or lesions      ASSESSMENT:  64yMale s/p T9-Pelvis PSF by Dr. F. Schwab on 03-25    PLAN:   - continue tylenol 1gm q8h  - continue robaxin 750 q8h  - dc lyrica 50mg TID  - continue PO oxy 5-10mg q4h prn moderate-severe pain, pt educated on use of PRNs  - continue IV toradol 30mg q8h x 3 days if no contraindications and OK with surgery    - Home pain regimen: oxy 10 BID PRN    - Bowel regimen: Senna, miralax  - Nausea ppx: Zofran as needed  - Functional Goals: Pt will get OOB with PT today. Pt will resume previous level of activity without impairment from surgery.   - Additional Consults: None recommended.   - Additional Labs/Imaging:  None recommended.     - Follow up, Discharge Planning: per primary team  - Pain Management follow up plan: will continue to follow    Plan d/w Dr. Medina.

## 2024-03-28 NOTE — DISCHARGE NOTE PROVIDER - CARE PROVIDER_API CALL
Schwab, Frank Johann  Orthopaedic Surgery  [address]  [place] [address]  Phone: [phone]  Fax: [phone]  Follow Up Time: 2 weeks

## 2024-03-28 NOTE — DISCHARGE NOTE PROVIDER - NSDCMRMEDTOKEN_GEN_ALL_CORE_FT
aspirin 81 mg oral tablet: orally once a day  atorvastatin 40 mg oral tablet: 1 tab(s) orally once a day (at bedtime)  Eliquis 5 mg oral tablet: 1 tab(s) orally 2 times a day  gabapentin 300 mg oral capsule: 1 cap(s) orally 2 times a day  losartan 100 mg oral tablet: 1 tab(s) orally once a day  metoprolol succinate 25 mg oral capsule, extended release: 1 cap(s) orally once a day  oxyCODONE 5 mg oral tablet: 1 tab(s) orally prn  pantoprazole 40 mg oral delayed release tablet: 1 tab(s) orally once a day (before a meal)   acetaminophen 500 mg oral tablet: 2 tab(s) orally every 8 hours  apixaban 5 mg oral tablet: 1 tab(s) orally 2 times a day  aspirin 81 mg oral tablet, chewable: 1 tab(s) orally once a day  atorvastatin 40 mg oral tablet: 1 tab(s) orally once a day (at bedtime)  gabapentin 300 mg oral capsule: 1 cap(s) orally 2 times a day  losartan 50 mg oral tablet: 1 tab(s) orally once a day  methocarbamol 750 mg oral tablet: 1 tab(s) orally every 8 hours  metoprolol succinate 25 mg oral tablet, extended release: 1 tab(s) orally once a day  oxyCODONE 10 mg oral tablet: 1 tab(s) orally every 4 hours As needed Severe Pain (7 - 10)  oxyCODONE 5 mg oral tablet: 1 tab(s) orally every 4 hours As needed Moderate Pain (4 - 6)  pantoprazole 40 mg oral delayed release tablet: 1 tab(s) orally once a day (before a meal)  senna leaf extract oral tablet: 2 tab(s) orally once a day (at bedtime)   acetaminophen 500 mg oral tablet: 2 tab(s) orally every 8 hours  apixaban 5 mg oral tablet: 1 tab(s) orally 2 times a day  aspirin 81 mg oral tablet, chewable: 1 tab(s) orally once a day  atorvastatin 40 mg oral tablet: 1 tab(s) orally once a day (at bedtime)  gabapentin 300 mg oral capsule: 1 cap(s) orally 2 times a day  losartan 50 mg oral tablet: 1 tab(s) orally once a day  metoprolol succinate 25 mg oral tablet, extended release: 1 tab(s) orally once a day  oxyCODONE 10 mg oral tablet: 1 tab(s) orally every 4 hours As needed Severe Pain (7 - 10)  oxyCODONE 5 mg oral tablet: 1 tab(s) orally every 4 hours As needed Moderate Pain (4 - 6)  pantoprazole 40 mg oral delayed release tablet: 1 tab(s) orally once a day (before a meal)  senna leaf extract oral tablet: 2 tab(s) orally once a day (at bedtime)

## 2024-03-28 NOTE — DISCHARGE NOTE PROVIDER - NSDCFUADDINST_GEN_ALL_CORE_FT
ACTIVITY:   - No extreme bending, extending, turning, twisting, or straining. No strenuous activity, heavy lifting, driving or returning to work until cleared by your surgeon.     DRESSING/SHOWERING:    (PRINEO – mesh with glue)   -May shower post-op day 1, pat dry afterwards. Dressing is water-resistant. Do not soak in bathtubs. Do not need to cover with another dressing. Do not remove mesh dressing – it will be taken care of in your follow up appointment. Do not apply ointments, creams or oils to incision area.     (STAPLES/SUTURES/STERI-STRIPS)   -Change dressing daily until post-op day 5. Sponge bathe until post-op day 5 then may take full shower. Once dressing removed keep incision clean and dry. Do not pick at your incision. Do not apply creams, ointments or oils to your incision until cleared by your surgeon. Do not soak your incision in sitting water (ie tubs, pools, lakes, etc.) until cleared by your surgeon.      MEDICATION/ANTICOAGULATION:   - You have been prescribed medications for pain:     - Tylenol (Acetaminophen) for mild to moderate pain. Do not exceed 3,000mg daily.     - For more severe pain, you may continue to take the Tylenol with the addition of narcotic pain medication. Take this medication as prescribed. Do not take more than prescribed. Note that this medication may cause drowsiness or dizziness. Do not operate machinery. This medication may cause constipation.   - If you have been prescribed a muscle relaxer, take this medication as needed for muscle spasm. Follow instructions on bottle.   - Try to have regular bowel movements. Take stool softener or laxative if necessary. You may wish to take Miralax daily until you have regular bowel movements.    - Do not take antiinflammatories (Aleve, Advil, Naproxen, Ibuprofen, etc.) until cleared by your surgeon. Tylenol is not an anti-inflammatory and okay to take (see above).   - If you have a pain management physician, please follow-up with them postoperatively.    - If you experience any negative side effects of your medications, please call your surgeon's office to discuss.     FOLLOW UP:   - Call to schedule an appt with  *** for follow up.    - Please follow-up with your primary care physician or any other specialist you see postoperatively, if needed.    - Contact your doctor or go to the emergency room if you experience: fever greater than 101.5, chills, chest pain, difficulty breathing, redness or excessive drainage around the incision, other concerns.    You received a blood transfusion during your admission. Your hemoglobin at the time of discharge was . follow up with your PCP in 1 week to check your hemoglobin levels.    ACTIVITY:   - No extreme bending, extending, turning, twisting, or straining. No strenuous activity, heavy lifting, driving or returning to work until cleared by your surgeon.     DRESSING/SHOWERING:    (PRINEO – mesh with glue)   -May shower post-op day 1, pat dry afterwards. Dressing is water-resistant. Do not soak in bathtubs. Do not need to cover with another dressing. Do not remove mesh dressing – it will be taken care of in your follow up appointment. Do not apply ointments, creams or oils to incision area.     (STAPLES/SUTURES/STERI-STRIPS)   -Change dressing daily until post-op day 5. Sponge bathe until post-op day 5 then may take full shower. Once dressing removed keep incision clean and dry. Do not pick at your incision. Do not apply creams, ointments or oils to your incision until cleared by your surgeon. Do not soak your incision in sitting water (ie tubs, pools, lakes, etc.) until cleared by your surgeon.      MEDICATION/ANTICOAGULATION:   - You have been prescribed medications for pain:     - Tylenol (Acetaminophen) for mild to moderate pain. Do not exceed 3,000mg daily.     - For more severe pain, you may continue to take the Tylenol with the addition of narcotic pain medication. Take this medication as prescribed. Do not take more than prescribed. Note that this medication may cause drowsiness or dizziness. Do not operate machinery. This medication may cause constipation.   - If you have been prescribed a muscle relaxer, take this medication as needed for muscle spasm. Follow instructions on bottle.   - Try to have regular bowel movements. Take stool softener or laxative if necessary. You may wish to take Miralax daily until you have regular bowel movements.    - Do not take antiinflammatories (Aleve, Advil, Naproxen, Ibuprofen, etc.) until cleared by your surgeon. Tylenol is not an anti-inflammatory and okay to take (see above).   - If you have a pain management physician, please follow-up with them postoperatively.    - If you experience any negative side effects of your medications, please call your surgeon's office to discuss.     FOLLOW UP:   - Call to schedule an appt with  *** for follow up.    - Please follow-up with your primary care physician or any other specialist you see postoperatively, if needed.    - Contact your doctor or go to the emergency room if you experience: fever greater than 101.5, chills, chest pain, difficulty breathing, redness or excessive drainage around the incision, other concerns.    You received a blood transfusion during your admission. Your hemoglobin at the time of discharge was 9.0. follow up with your PCP in 1 week to check your hemoglobin levels.    ACTIVITY:   - No extreme bending, extending, turning, twisting, or straining. No strenuous activity, heavy lifting, driving or returning to work until cleared by your surgeon.     DRESSING/SHOWERING:    (PRINEO – mesh with glue)   -May shower post-op day 1, pat dry afterwards. Dressing is water-resistant. Do not soak in bathtubs. Do not need to cover with another dressing. Do not remove mesh dressing – it will be taken care of in your follow up appointment. Do not apply ointments, creams or oils to incision area.     MEDICATION/ANTICOAGULATION:   - You have been prescribed medications for pain:     - Tylenol (Acetaminophen) for mild to moderate pain. Do not exceed 3,000mg daily.     - For more severe pain, you may continue to take the Tylenol with the addition of narcotic pain medication. Take this medication as prescribed. Do not take more than prescribed. Note that this medication may cause drowsiness or dizziness. Do not operate machinery. This medication may cause constipation.   - If you have been prescribed a muscle relaxer, take this medication as needed for muscle spasm. Follow instructions on bottle.   - Try to have regular bowel movements. Take stool softener or laxative if necessary. You may wish to take Miralax daily until you have regular bowel movements.    - Do not take antiinflammatories (Aleve, Advil, Naproxen, Ibuprofen, etc.) until cleared by your surgeon. Tylenol is not an anti-inflammatory and okay to take (see above).   - If you have a pain management physician, please follow-up with them postoperatively.    - If you experience any negative side effects of your medications, please call your surgeon's office to discuss.     FOLLOW UP:   - Call to schedule an appt with  *** for follow up.    - Please follow-up with your primary care physician or any other specialist you see postoperatively, if needed.    - Contact your doctor or go to the emergency room if you experience: fever greater than 101.5, chills, chest pain, difficulty breathing, redness or excessive drainage around the incision, other concerns.    You received a blood transfusion during your admission. Your hemoglobin at the time of discharge was 9.0. follow up with your PCP in 1 week to check your hemoglobin levels.   Your home losartan was restarted at 50mg PO daily, if your bp is not well controlled can restart 100mg PO daily.

## 2024-03-28 NOTE — PROGRESS NOTE ADULT - SUBJECTIVE AND OBJECTIVE BOX
Ortho Progress Note    Procedure: T9-Pelvis PSF on 3/25  Surgeon: Dr. F. Schwab    Pt comfortable without complaints, pain controlled. S/p 1u PRBC yesterday.  Denies CP, SOB, N/V, numbness/tingling     Vital Signs Last 24 Hrs  T(C): 37.3 (28 Mar 2024 05:39), Max: 37.3 (27 Mar 2024 15:51)  T(F): 99.1 (28 Mar 2024 05:39), Max: 99.1 (27 Mar 2024 15:51)  HR: 81 (28 Mar 2024 05:39) (70 - 82)  BP: 125/78 (28 Mar 2024 05:39) (114/70 - 134/73)  BP(mean): --  RR: 17 (28 Mar 2024 05:39) (16 - 18)  SpO2: 98% (28 Mar 2024 05:39) (95% - 98%)    Parameters below as of 28 Mar 2024 05:39  Patient On (Oxygen Delivery Method): room air    General: Pt Alert and oriented, NAD  DSG C/D/I  Drains: JPx1  Pulses: 2+ DP  Sensation: SILT grossly L2-S1 bilaterally  Motor: 5/5 L2-S1 bilaterally    A/P: 64yMale s/p T9-Pelvis PSF by Dr. F. Schwab on 03-25  - Stable  - Pain Control  - DVT ppx: SCDs, Home Eliquis   - Post op abx: Ancef  - Drains: JPx1 30/255  - WBS: WBAT  - AR  - s/p 1u PRBC on 3/27 w/good response, continue to trend Hgb  - Standing XR when able to tolerate

## 2024-03-29 ENCOUNTER — TRANSCRIPTION ENCOUNTER (OUTPATIENT)
Age: 64
End: 2024-03-29

## 2024-03-29 VITALS
DIASTOLIC BLOOD PRESSURE: 72 MMHG | HEART RATE: 81 BPM | OXYGEN SATURATION: 99 % | RESPIRATION RATE: 18 BRPM | SYSTOLIC BLOOD PRESSURE: 110 MMHG

## 2024-03-29 LAB
ADD ON TEST-SPECIMEN IN LAB: SIGNIFICANT CHANGE UP
ANION GAP SERPL CALC-SCNC: 3 MMOL/L — LOW (ref 5–17)
BUN SERPL-MCNC: 14 MG/DL — SIGNIFICANT CHANGE UP (ref 7–23)
CALCIUM SERPL-MCNC: 9.4 MG/DL — SIGNIFICANT CHANGE UP (ref 8.4–10.5)
CHLORIDE SERPL-SCNC: 101 MMOL/L — SIGNIFICANT CHANGE UP (ref 96–108)
CO2 SERPL-SCNC: 32 MMOL/L — HIGH (ref 22–31)
CREAT SERPL-MCNC: 0.81 MG/DL — SIGNIFICANT CHANGE UP (ref 0.5–1.3)
EGFR: 98 ML/MIN/1.73M2 — SIGNIFICANT CHANGE UP
GLUCOSE SERPL-MCNC: 113 MG/DL — HIGH (ref 70–99)
HCT VFR BLD CALC: 28 % — LOW (ref 39–50)
HGB BLD-MCNC: 9 G/DL — LOW (ref 13–17)
MCHC RBC-ENTMCNC: 29.5 PG — SIGNIFICANT CHANGE UP (ref 27–34)
MCHC RBC-ENTMCNC: 32.1 GM/DL — SIGNIFICANT CHANGE UP (ref 32–36)
MCV RBC AUTO: 91.8 FL — SIGNIFICANT CHANGE UP (ref 80–100)
NRBC # BLD: 0 /100 WBCS — SIGNIFICANT CHANGE UP (ref 0–0)
PLATELET # BLD AUTO: 157 K/UL — SIGNIFICANT CHANGE UP (ref 150–400)
POTASSIUM SERPL-MCNC: 4.6 MMOL/L — SIGNIFICANT CHANGE UP (ref 3.5–5.3)
POTASSIUM SERPL-SCNC: 4.6 MMOL/L — SIGNIFICANT CHANGE UP (ref 3.5–5.3)
RAPID RVP RESULT: SIGNIFICANT CHANGE UP
RBC # BLD: 3.05 M/UL — LOW (ref 4.2–5.8)
RBC # FLD: 14.1 % — SIGNIFICANT CHANGE UP (ref 10.3–14.5)
SARS-COV-2 RNA SPEC QL NAA+PROBE: SIGNIFICANT CHANGE UP
SARS-COV-2 RNA SPEC QL NAA+PROBE: SIGNIFICANT CHANGE UP
SODIUM SERPL-SCNC: 136 MMOL/L — SIGNIFICANT CHANGE UP (ref 135–145)
WBC # BLD: 5.87 K/UL — SIGNIFICANT CHANGE UP (ref 3.8–10.5)
WBC # FLD AUTO: 5.87 K/UL — SIGNIFICANT CHANGE UP (ref 3.8–10.5)

## 2024-03-29 PROCEDURE — 72020 X-RAY EXAM OF SPINE 1 VIEW: CPT

## 2024-03-29 PROCEDURE — 72082 X-RAY EXAM ENTIRE SPI 2/3 VW: CPT

## 2024-03-29 PROCEDURE — 86900 BLOOD TYPING SEROLOGIC ABO: CPT

## 2024-03-29 PROCEDURE — 76000 FLUOROSCOPY <1 HR PHYS/QHP: CPT

## 2024-03-29 PROCEDURE — 87635 SARS-COV-2 COVID-19 AMP PRB: CPT

## 2024-03-29 PROCEDURE — 36430 TRANSFUSION BLD/BLD COMPNT: CPT

## 2024-03-29 PROCEDURE — 85025 COMPLETE CBC W/AUTO DIFF WBC: CPT

## 2024-03-29 PROCEDURE — 85730 THROMBOPLASTIN TIME PARTIAL: CPT

## 2024-03-29 PROCEDURE — 85610 PROTHROMBIN TIME: CPT

## 2024-03-29 PROCEDURE — 36415 COLL VENOUS BLD VENIPUNCTURE: CPT

## 2024-03-29 PROCEDURE — C1713: CPT

## 2024-03-29 PROCEDURE — 80048 BASIC METABOLIC PNL TOTAL CA: CPT

## 2024-03-29 PROCEDURE — 97116 GAIT TRAINING THERAPY: CPT

## 2024-03-29 PROCEDURE — 97161 PT EVAL LOW COMPLEX 20 MIN: CPT

## 2024-03-29 PROCEDURE — P9045: CPT

## 2024-03-29 PROCEDURE — 86923 COMPATIBILITY TEST ELECTRIC: CPT

## 2024-03-29 PROCEDURE — 0225U NFCT DS DNA&RNA 21 SARSCOV2: CPT

## 2024-03-29 PROCEDURE — P9040: CPT

## 2024-03-29 PROCEDURE — 86901 BLOOD TYPING SEROLOGIC RH(D): CPT

## 2024-03-29 PROCEDURE — 86850 RBC ANTIBODY SCREEN: CPT

## 2024-03-29 PROCEDURE — C1889: CPT

## 2024-03-29 PROCEDURE — 97166 OT EVAL MOD COMPLEX 45 MIN: CPT

## 2024-03-29 PROCEDURE — 85027 COMPLETE CBC AUTOMATED: CPT

## 2024-03-29 PROCEDURE — 99233 SBSQ HOSP IP/OBS HIGH 50: CPT

## 2024-03-29 PROCEDURE — 97530 THERAPEUTIC ACTIVITIES: CPT

## 2024-03-29 RX ORDER — MAGNESIUM HYDROXIDE 400 MG/1
30 TABLET, CHEWABLE ORAL DAILY
Refills: 0 | Status: DISCONTINUED | OUTPATIENT
Start: 2024-03-29 | End: 2024-03-29

## 2024-03-29 RX ORDER — METHOCARBAMOL 500 MG/1
1 TABLET, FILM COATED ORAL
Qty: 0 | Refills: 0 | DISCHARGE
Start: 2024-03-29

## 2024-03-29 RX ORDER — LOSARTAN POTASSIUM 100 MG/1
50 TABLET, FILM COATED ORAL DAILY
Refills: 0 | Status: DISCONTINUED | OUTPATIENT
Start: 2024-03-29 | End: 2024-03-29

## 2024-03-29 RX ORDER — METOPROLOL TARTRATE 50 MG
1 TABLET ORAL
Qty: 0 | Refills: 0 | DISCHARGE
Start: 2024-03-29

## 2024-03-29 RX ORDER — OXYCODONE HYDROCHLORIDE 5 MG/1
5 TABLET ORAL EVERY 4 HOURS
Refills: 0 | Status: DISCONTINUED | OUTPATIENT
Start: 2024-03-29 | End: 2024-03-29

## 2024-03-29 RX ORDER — METOPROLOL TARTRATE 50 MG
1 TABLET ORAL
Refills: 0 | DISCHARGE

## 2024-03-29 RX ORDER — LOSARTAN POTASSIUM 100 MG/1
1 TABLET, FILM COATED ORAL
Refills: 0 | DISCHARGE

## 2024-03-29 RX ORDER — APIXABAN 2.5 MG/1
1 TABLET, FILM COATED ORAL
Refills: 0 | DISCHARGE

## 2024-03-29 RX ORDER — OXYCODONE HYDROCHLORIDE 5 MG/1
1 TABLET ORAL
Qty: 0 | Refills: 0 | DISCHARGE
Start: 2024-03-29

## 2024-03-29 RX ORDER — ASPIRIN/CALCIUM CARB/MAGNESIUM 324 MG
0 TABLET ORAL
Refills: 0 | DISCHARGE

## 2024-03-29 RX ORDER — PANTOPRAZOLE SODIUM 20 MG/1
1 TABLET, DELAYED RELEASE ORAL
Qty: 0 | Refills: 0 | DISCHARGE
Start: 2024-03-29

## 2024-03-29 RX ORDER — ACETAMINOPHEN 500 MG
2 TABLET ORAL
Qty: 0 | Refills: 0 | DISCHARGE
Start: 2024-03-29

## 2024-03-29 RX ORDER — SENNA PLUS 8.6 MG/1
2 TABLET ORAL
Qty: 0 | Refills: 0 | DISCHARGE
Start: 2024-03-29

## 2024-03-29 RX ORDER — LOSARTAN POTASSIUM 100 MG/1
1 TABLET, FILM COATED ORAL
Qty: 0 | Refills: 0 | DISCHARGE
Start: 2024-03-29

## 2024-03-29 RX ORDER — APIXABAN 2.5 MG/1
1 TABLET, FILM COATED ORAL
Qty: 0 | Refills: 0 | DISCHARGE
Start: 2024-03-29

## 2024-03-29 RX ORDER — ASPIRIN/CALCIUM CARB/MAGNESIUM 324 MG
1 TABLET ORAL
Qty: 0 | Refills: 0 | DISCHARGE
Start: 2024-03-29

## 2024-03-29 RX ORDER — ATORVASTATIN CALCIUM 80 MG/1
1 TABLET, FILM COATED ORAL
Qty: 0 | Refills: 0 | DISCHARGE
Start: 2024-03-29

## 2024-03-29 RX ORDER — OXYCODONE HYDROCHLORIDE 5 MG/1
1 TABLET ORAL
Refills: 0 | DISCHARGE

## 2024-03-29 RX ORDER — OXYCODONE HYDROCHLORIDE 5 MG/1
10 TABLET ORAL EVERY 4 HOURS
Refills: 0 | Status: DISCONTINUED | OUTPATIENT
Start: 2024-03-29 | End: 2024-03-29

## 2024-03-29 RX ADMIN — Medication 81 MILLIGRAM(S): at 12:16

## 2024-03-29 RX ADMIN — Medication 1000 MILLIGRAM(S): at 07:16

## 2024-03-29 RX ADMIN — OXYCODONE HYDROCHLORIDE 10 MILLIGRAM(S): 5 TABLET ORAL at 16:26

## 2024-03-29 RX ADMIN — LOSARTAN POTASSIUM 50 MILLIGRAM(S): 100 TABLET, FILM COATED ORAL at 12:16

## 2024-03-29 RX ADMIN — MAGNESIUM HYDROXIDE 30 MILLILITER(S): 400 TABLET, CHEWABLE ORAL at 12:16

## 2024-03-29 RX ADMIN — OXYCODONE HYDROCHLORIDE 10 MILLIGRAM(S): 5 TABLET ORAL at 19:38

## 2024-03-29 RX ADMIN — APIXABAN 5 MILLIGRAM(S): 2.5 TABLET, FILM COATED ORAL at 06:17

## 2024-03-29 RX ADMIN — Medication 1000 MILLIGRAM(S): at 16:26

## 2024-03-29 RX ADMIN — ONDANSETRON 4 MILLIGRAM(S): 8 TABLET, FILM COATED ORAL at 06:17

## 2024-03-29 RX ADMIN — OXYCODONE HYDROCHLORIDE 10 MILLIGRAM(S): 5 TABLET ORAL at 07:17

## 2024-03-29 RX ADMIN — Medication 30 MILLIGRAM(S): at 06:42

## 2024-03-29 RX ADMIN — Medication 1000 MILLIGRAM(S): at 06:16

## 2024-03-29 RX ADMIN — Medication 25 MILLIGRAM(S): at 06:16

## 2024-03-29 RX ADMIN — OXYCODONE HYDROCHLORIDE 10 MILLIGRAM(S): 5 TABLET ORAL at 20:38

## 2024-03-29 RX ADMIN — OXYCODONE HYDROCHLORIDE 10 MILLIGRAM(S): 5 TABLET ORAL at 15:26

## 2024-03-29 RX ADMIN — METHOCARBAMOL 750 MILLIGRAM(S): 500 TABLET, FILM COATED ORAL at 06:16

## 2024-03-29 RX ADMIN — OXYCODONE HYDROCHLORIDE 10 MILLIGRAM(S): 5 TABLET ORAL at 11:06

## 2024-03-29 RX ADMIN — POLYETHYLENE GLYCOL 3350 17 GRAM(S): 17 POWDER, FOR SOLUTION ORAL at 12:16

## 2024-03-29 RX ADMIN — Medication 1000 MILLIGRAM(S): at 15:26

## 2024-03-29 RX ADMIN — Medication 30 MILLIGRAM(S): at 06:17

## 2024-03-29 RX ADMIN — OXYCODONE HYDROCHLORIDE 10 MILLIGRAM(S): 5 TABLET ORAL at 06:17

## 2024-03-29 RX ADMIN — APIXABAN 5 MILLIGRAM(S): 2.5 TABLET, FILM COATED ORAL at 17:34

## 2024-03-29 RX ADMIN — OXYCODONE HYDROCHLORIDE 10 MILLIGRAM(S): 5 TABLET ORAL at 10:06

## 2024-03-29 RX ADMIN — PANTOPRAZOLE SODIUM 40 MILLIGRAM(S): 20 TABLET, DELAYED RELEASE ORAL at 06:17

## 2024-03-29 NOTE — PROGRESS NOTE ADULT - SUBJECTIVE AND OBJECTIVE BOX
INTERVAL HPI/OVERNIGHT EVENTS: brandon o/n    SUBJECTIVE: Patient seen and examined at bedside.   feels less fuzzy and groggy - wants to go down on pain regimen if possible. Ambulating wo LH/dizziness, chest pain, dsypnea. Eating wo N/V/abd pain. +flatus but no BM yet. No fever, chills, sweats.     OBJECTIVE:    VITAL SIGNS:  ICU Vital Signs Last 24 Hrs  T(C): 36.9 (29 Mar 2024 08:45), Max: 37.6 (28 Mar 2024 15:41)  T(F): 98.4 (29 Mar 2024 08:45), Max: 99.7 (28 Mar 2024 15:41)  HR: 76 (29 Mar 2024 08:45) (76 - 82)  BP: 157/92 (29 Mar 2024 08:45) (115/66 - 157/92)  BP(mean): 113 (29 Mar 2024 08:45) (113 - 113)  ABP: --  ABP(mean): --  RR: 18 (29 Mar 2024 08:45) (17 - 18)  SpO2: 95% (29 Mar 2024 08:45) (95% - 98%)    O2 Parameters below as of 29 Mar 2024 08:45  Patient On (Oxygen Delivery Method): room air              03-28 @ 07:01 - 03-29 @ 07:00  --------------------------------------------------------  IN: 0 mL / OUT: 485 mL / NET: -485 mL    03-29 @ 07:01 - 03-29 @ 11:55  --------------------------------------------------------  IN: 0 mL / OUT: 230 mL / NET: -230 mL      CAPILLARY BLOOD GLUCOSE          PHYSICAL EXAM:  GEN: Male in NAD on RA  HEENT: NC/AT, MMM  CV: RRR, nml S1S2, no murmurs  PULM: nml effort, CTAB  ABD: Soft, non-distended, NABS, non-tender  NEURO  A/O x3, moving all extremities, Sensation intact.4+/5 in R foot plantarfflexion - chronic. Otherwise 5/5  Lumbar dressing c/d/i  PSYCH: Appropriate    MEDICATIONS:  MEDICATIONS  (STANDING):  acetaminophen     Tablet .. 1000 milliGRAM(s) Oral every 8 hours  apixaban 5 milliGRAM(s) Oral two times a day  aspirin  chewable 81 milliGRAM(s) Oral daily  atorvastatin 40 milliGRAM(s) Oral at bedtime  influenza   Vaccine 0.5 milliLiter(s) IntraMuscular once  ketorolac   Injectable 30 milliGRAM(s) IV Push every 8 hours  lactated ringers. 1000 milliLiter(s) (100 mL/Hr) IV Continuous <Continuous>  losartan 50 milliGRAM(s) Oral daily  methocarbamol 750 milliGRAM(s) Oral every 8 hours  metoprolol succinate ER 25 milliGRAM(s) Oral daily  ondansetron   Disintegrating Tablet 4 milliGRAM(s) Oral every 6 hours  pantoprazole    Tablet 40 milliGRAM(s) Oral before breakfast  polyethylene glycol 3350 17 Gram(s) Oral daily  senna 2 Tablet(s) Oral at bedtime    MEDICATIONS  (PRN):  magnesium hydroxide Suspension 30 milliLiter(s) Oral daily PRN Constipation  oxyCODONE    IR 5 milliGRAM(s) Oral every 4 hours PRN Moderate Pain (4 - 6)  oxyCODONE    IR 10 milliGRAM(s) Oral every 4 hours PRN Severe Pain (7 - 10)      ALLERGIES:  Allergies    Allergy Status Unknown    Intolerances        LABS:                        9.0    5.87  )-----------( 157      ( 29 Mar 2024 05:30 )             28.0     03-29    136  |  101  |  14  ----------------------------<  113<H>  4.6   |  32<H>  |  0.81    Ca    9.4      29 Mar 2024 05:30        Urinalysis Basic - ( 29 Mar 2024 05:30 )    Color: x / Appearance: x / SG: x / pH: x  Gluc: 113 mg/dL / Ketone: x  / Bili: x / Urobili: x   Blood: x / Protein: x / Nitrite: x   Leuk Esterase: x / RBC: x / WBC x   Sq Epi: x / Non Sq Epi: x / Bacteria: x        RADIOLOGY & ADDITIONAL TESTS: Reviewed.

## 2024-03-29 NOTE — PROGRESS NOTE ADULT - PA/NP ONLY VISIT
PA/NP only visit
PA/NP only visit
Demand ischemia of myocardium
Chronic kidney disease, stage III (moderate)
Chronic kidney disease, stage III (moderate)
Hypokalemia

## 2024-03-29 NOTE — PROGRESS NOTE ADULT - SUBJECTIVE AND OBJECTIVE BOX
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 1000 milliGRAM(s) Oral every 8 hours  ketorolac   Injectable 30 milliGRAM(s) IV Push every 8 hours  methocarbamol 750 milliGRAM(s) Oral every 8 hours  ondansetron   Disintegrating Tablet 4 milliGRAM(s) Oral every 6 hours  oxyCODONE    IR 10 milliGRAM(s) Oral every 4 hours PRN  oxyCODONE    IR 5 milliGRAM(s) Oral every 4 hours PRN    Heme:  apixaban 5 milliGRAM(s) Oral two times a day  aspirin  chewable 81 milliGRAM(s) Oral daily    Antibiotics:    Cardiovascular:  metoprolol succinate ER 25 milliGRAM(s) Oral daily    GI:  pantoprazole    Tablet 40 milliGRAM(s) Oral before breakfast  polyethylene glycol 3350 17 Gram(s) Oral daily  senna 2 Tablet(s) Oral at bedtime    Endocrine:  atorvastatin 40 milliGRAM(s) Oral at bedtime    All Other Medications:  influenza   Vaccine 0.5 milliLiter(s) IntraMuscular once  lactated ringers. 1000 milliLiter(s) IV Continuous <Continuous>      Vital Signs Last 24 Hrs  T(C): 37.2 (29 Mar 2024 06:00), Max: 37.6 (28 Mar 2024 15:41)  T(F): 98.9 (29 Mar 2024 06:00), Max: 99.7 (28 Mar 2024 15:41)  HR: 80 (29 Mar 2024 06:00) (74 - 82)  BP: 141/76 (29 Mar 2024 06:00) (115/66 - 141/76)  BP(mean): 88 (28 Mar 2024 09:00) (88 - 88)  RR: 17 (29 Mar 2024 06:00) (16 - 17)  SpO2: 98% (29 Mar 2024 06:00) (95% - 98%)    Parameters below as of 29 Mar 2024 06:00  Patient On (Oxygen Delivery Method): room air        LABS:                        9.0    5.87  )-----------( 157      ( 29 Mar 2024 05:30 )             28.0     03-29    136  |  101  |  14  ----------------------------<  113<H>  4.6   |  32<H>  |  0.81    Ca    9.4      29 Mar 2024 05:30        Urinalysis Basic - ( 29 Mar 2024 05:30 )    Color: x / Appearance: x / SG: x / pH: x  Gluc: 113 mg/dL / Ketone: x  / Bili: x / Urobili: x   Blood: x / Protein: x / Nitrite: x   Leuk Esterase: x / RBC: x / WBC x   Sq Epi: x / Non Sq Epi: x / Bacteria: x        RADIOLOGY:    Drug Screen:        REVIEW OF SYSTEMS:  CONSTITUTIONAL: No fever or fatigue O/N.   EYES: No eye pain, visual disturbances  ENMT:  No difficulty hearing. No throat pain  NECK: No pain or stiffness  RESPIRATORY: No cough, wheezing; No shortness of breath  CARDIOVASCULAR: No chest pain, palpitations.   GASTROINTESTINAL: Pt reports passing gas, bowel movements. No abdominal or epigastric pain. No nausea, vomiting.   GENITOURINARY: No dysuria, frequency, or incontinence  NEUROLOGICAL: No headaches, loss of strength, numbness, or tremors. No dizzinesss or lightheadedness with pain medications.   MUSCULOSKELETAL: Incisional back pain. No joint pain or swelling; No muscle, or extremity pain      FUNCTIONAL ASSESSMENT:  PAIN SCORE AT REST:         SCALE USED: (1-10 VNRS)  PAIN SCORE WITH ACTIVITY:         SCALE USED: (1-10 VNRS)    PAIN ASSESSMENT:    PHYSICAL EXAM  GENERAL: Laying in bed, NAD  Neuro: CN II-XII PERRRL, EOMI  Cranial nerves grossly intact  Motor exam: GOLDEN  Sensation intact to LT in UE/LE in 3 dermatomes  CHEST/LUNG: Clear to auscultation bilaterally; No rales, rhonchi, wheezing, or rubs  HEART: Regular rate and rhythm; No murmurs, rubs, or gallops  ABDOMEN: Soft, Nontender, Nondistended; Bowel sounds present  EXTREMITIES:  2+ Peripheral Pulses, No clubbing, cyanosis, or edema  SKIN: No rashes or lesions      ASSESSMENT: 64yMale s/p T9-Pelvis PSF by Dr. F. Schwab on 03-25    PLAN:   - continue tylenol 1gm q8h  - continue robaxin 750 q8h  - continue PO oxy 5-10mg q4h prn moderate-severe pain, pt educated on use of PRNs  - continue IV toradol 30mg q8h x 3 days if no contraindications and OK with surgery    - Home pain regimen: oxy 10 BID PRN    - Bowel regimen: Senna, miralax  - Nausea ppx: Zofran as needed  - Functional Goals: Pt will get OOB with PT today. Pt will resume previous level of activity without impairment from surgery.   - Additional Consults: None recommended.   - Additional Labs/Imaging:  None recommended.     - Follow up, Discharge Planning: per primary team  - Pain Management follow up plan: will continue to follow    Plan d/w Dr. Medina.    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 1000 milliGRAM(s) Oral every 8 hours  ketorolac   Injectable 30 milliGRAM(s) IV Push every 8 hours  methocarbamol 750 milliGRAM(s) Oral every 8 hours  ondansetron   Disintegrating Tablet 4 milliGRAM(s) Oral every 6 hours  oxyCODONE    IR 10 milliGRAM(s) Oral every 4 hours PRN  oxyCODONE    IR 5 milliGRAM(s) Oral every 4 hours PRN    Heme:  apixaban 5 milliGRAM(s) Oral two times a day  aspirin  chewable 81 milliGRAM(s) Oral daily    Antibiotics:    Cardiovascular:  metoprolol succinate ER 25 milliGRAM(s) Oral daily    GI:  pantoprazole    Tablet 40 milliGRAM(s) Oral before breakfast  polyethylene glycol 3350 17 Gram(s) Oral daily  senna 2 Tablet(s) Oral at bedtime    Endocrine:  atorvastatin 40 milliGRAM(s) Oral at bedtime    All Other Medications:  influenza   Vaccine 0.5 milliLiter(s) IntraMuscular once  lactated ringers. 1000 milliLiter(s) IV Continuous <Continuous>      Vital Signs Last 24 Hrs  T(C): 37.2 (29 Mar 2024 06:00), Max: 37.6 (28 Mar 2024 15:41)  T(F): 98.9 (29 Mar 2024 06:00), Max: 99.7 (28 Mar 2024 15:41)  HR: 80 (29 Mar 2024 06:00) (74 - 82)  BP: 141/76 (29 Mar 2024 06:00) (115/66 - 141/76)  BP(mean): 88 (28 Mar 2024 09:00) (88 - 88)  RR: 17 (29 Mar 2024 06:00) (16 - 17)  SpO2: 98% (29 Mar 2024 06:00) (95% - 98%)    Parameters below as of 29 Mar 2024 06:00  Patient On (Oxygen Delivery Method): room air        LABS:                        9.0    5.87  )-----------( 157      ( 29 Mar 2024 05:30 )             28.0     03-29    136  |  101  |  14  ----------------------------<  113<H>  4.6   |  32<H>  |  0.81    Ca    9.4      29 Mar 2024 05:30        Urinalysis Basic - ( 29 Mar 2024 05:30 )    Color: x / Appearance: x / SG: x / pH: x  Gluc: 113 mg/dL / Ketone: x  / Bili: x / Urobili: x   Blood: x / Protein: x / Nitrite: x   Leuk Esterase: x / RBC: x / WBC x   Sq Epi: x / Non Sq Epi: x / Bacteria: x        RADIOLOGY:    Drug Screen:      REVIEW OF SYSTEMS:  CONSTITUTIONAL: No fever or fatigue O/N.   EYES: No eye pain, visual disturbances  ENMT:  No difficulty hearing. No throat pain  NECK: No pain or stiffness  RESPIRATORY: No cough, wheezing; No shortness of breath  CARDIOVASCULAR: No chest pain, palpitations.   GASTROINTESTINAL: Pt reports passing gas, no bowel movements. No abdominal or epigastric pain. No nausea, vomiting.   GENITOURINARY: No dysuria, frequency, or incontinence  NEUROLOGICAL: No headaches, loss of strength, numbness, or tremors. No dizziness or lightheadedness with pain medications.   MUSCULOSKELETAL: Incisional back pain. No joint pain or swelling; No muscle, or extremity pain      FUNCTIONAL ASSESSMENT:  PAIN SCORE AT REST:         SCALE USED: (1-10 VNRS)  PAIN SCORE WITH ACTIVITY:         SCALE USED: (1-10 VNRS)    PAIN ASSESSMENT: 7/10 incisional pain    PHYSICAL EXAM  GENERAL: Laying in bed, NAD  Neuro: CN II-XII PERRRL, EOMI  Cranial nerves grossly intact  Motor exam: GOLDEN  Sensation intact to LT in UE/LE in 3 dermatomes  CHEST/LUNG: satting well on RA  HEART: Regular rate and rhythm; No murmurs, rubs, or gallops  ABDOMEN: Soft, Nontender, Nondistended; Bowel sounds present  EXTREMITIES:  2+ Peripheral Pulses, No clubbing, cyanosis, or edema  SKIN: No rashes or lesions      ASSESSMENT: 64yMale s/p T9-Pelvis PSF by Dr. F. Schwab on 03-25    PLAN:   - continue tylenol 1gm q8h  - decrease robaxin 500 q8h  - continue PO oxy 5-10mg q4h prn moderate-severe pain, pt educated on use of PRNs  - continue IV toradol 30mg q8h x 3 days if no contraindications and OK with surgery  - increase bowel regimen    - Home pain regimen: oxy 10 BID PRN    - Bowel regimen: Senna, miralax  - Nausea ppx: Zofran as needed  - Functional Goals: Pt will get OOB with PT today. Pt will resume previous level of activity without impairment from surgery.   - Additional Consults: None recommended.   - Additional Labs/Imaging:  None recommended.     - Follow up, Discharge Planning: per primary team  - Pain Management follow up plan: will continue to follow    Plan d/w Dr. Medina.

## 2024-03-29 NOTE — PROGRESS NOTE ADULT - SUBJECTIVE AND OBJECTIVE BOX
pt is 4 days post operative back closure with Dr. Plunkett from [facility].   pt reports doing well and no complaints at this time.   one examination incision is dry and intact with prineo in place along the midline of the spine.   no erythema or signs of infection at this time noted. one drain in place that is to be removed based on output.

## 2024-03-29 NOTE — PROGRESS NOTE ADULT - SUBJECTIVE AND OBJECTIVE BOX
Ortho Note    Subjective:  Pt seen and examined today, patient reports pain controlled with current regimen but he feels like the methocarbamol may be too strong for her and is requested the medication be discontinued.  Denies Numbness and tingling   Reviewed plan of care with patient at bedside      Vital Signs Last 24 Hrs  T(C): 36.9 (03-29-24 @ 08:45), Max: 37.2 (03-29-24 @ 06:00)  T(F): 98.4 (03-29-24 @ 08:45), Max: 98.9 (03-29-24 @ 06:00)  HR: 76 (03-29-24 @ 08:45) (76 - 80)  BP: 157/92 (03-29-24 @ 08:45) (141/76 - 157/92)  BP(mean): 113 (03-29-24 @ 08:45) (113 - 113)  RR: 18 (03-29-24 @ 08:45) (17 - 18)  SpO2: 95% (03-29-24 @ 08:45) (95% - 98%)  AVSS    Objective:    Physical Exam:  General: Pt Alert and oriented, NAD  thoracic/Lumbar Prineo dressing, Camilo x1   Pulses: +2 pedal pulses,   Sensation: silt intact bilateral lower extremities  Motor: EHL/FHL/TA/GS-  5/5 LLE  RLE ehl- 3/5  /fhl - 3/5  TA-tibialis posterior  3/5  tibialis posterior /Gastroc- 3/5       Plan of Care:  A/P: 64yMale s/p T9-Pelvis PSF by Dr. F. Schwab on 03-25  - afebrile, wbcs 5.87  - Pain Control-   tylenol 1000mg PO Q8h, DC methocarbamol 750mg PO Q8h, Oxycodone 5-10mg PO Q4h prn moderate to severe pain,   - DVT ppx: apixaban 5mg PO BID, asa 81mg PO daily   - PT, WBS: WBAT  - appreciate medicine recs  - appreciate pain management recs  - bowel regimen, IS use, PPI   - continue CAMILO  - standing xray today completed 3-28  - Dispo- AR, pending auth and acceptance, dc of drain,    Ortho Pager [phone]

## 2024-03-29 NOTE — PROGRESS NOTE ADULT - PROVIDER SPECIALTY LIST ADULT
Orthopedics
Pain Medicine
Pain Medicine
Hospitalist
Hospitalist
Orthopedics
Orthopedics
Hospitalist
Orthopedics
Orthopedics
Pain Medicine
Pain Medicine
Plastic Surgery
Plastic Surgery

## 2024-03-29 NOTE — DISCHARGE NOTE NURSING/CASE MANAGEMENT/SOCIAL WORK - NSDCPEFALRISK_GEN_ALL_CORE
For information on Fall & Injury Prevention, visit: [email or web address] OR  [email or web address] OR  https://www.cdc.gov/steadi/patient.html

## 2024-03-29 NOTE — PROGRESS NOTE ADULT - SUBJECTIVE AND OBJECTIVE BOX
Ortho Progress Note    Procedure: T9-Pelvis PSF on 3/25  Surgeon: Dr. F. Schwab    Pt comfortable without complaints, pain controlled.   Denies CP, SOB, N/V, numbness/tingling     Vital Signs Last 24 Hrs  T(C): 37.2 (29 Mar 2024 06:04), Max: 37.6 (28 Mar 2024 15:41)  T(F): 98.9 (29 Mar 2024 06:04), Max: 99.7 (28 Mar 2024 15:41)  HR: 80 (29 Mar 2024 06:04) (74 - 82)  BP: 120/70 (29 Mar 2024 06:04) (115/66 - 136/84)  BP(mean): 88 (28 Mar 2024 09:00) (88 - 88)  RR: 17 (29 Mar 2024 06:04) (16 - 17)  SpO2: 96% (29 Mar 2024 06:04) (95% - 98%)    Parameters below as of 29 Mar 2024 06:04  Patient On (Oxygen Delivery Method): room air    General: Pt Alert and oriented, NAD  DSG C/D/I  Drains: JPx1  Pulses: 2+ DP  Sensation: SILT grossly L2-S1 bilaterally  Motor: 5/5 L2-S1 bilaterally    A/P: 64yMale s/p T9-Pelvis PSF by Dr. F. Schwab on 03-25  - Stable  - Pain Control  - DVT ppx: SCDs, Home Eliquis   - Post op abx: Ancef  - Drains: JPx1   - WBS: WBAT  - AR pending auth  - s/p 1u PRBC on 3/27 w/good response, continue to trend Hgb  - Standing XR completed

## 2024-03-29 NOTE — PROGRESS NOTE ADULT - ASSESSMENT
64M w CAD s/p CABG, AFib on eliquis, HTN, prior spine surgeries w chronic pain, R foot drop, here for elective PSFT T9-Pelvis w Dr. Schwab 3/25, for acute rehab, s/p 1u RBC 3/27    #Post-op state - pain controlled. PPx: Eliquis 5 BID. On bowel regimen and incentive spirometer  #Lumbar stenosis   - Tylenol 1g q8, robaxin 750q8   - PRN: Oxycodone 5/10 q4 for mod/severe pain     #Acute blood loss anemia - 9 today. s/p 1u 3/27. Baseline 14.2. Asymptomatic  #Leukocytosis - Resolved.    #AFib s/p radioablation and WILLIAMS clipping - Toprol 100mg, eliquis 5mg BID  #CAD s/p CABG - on ASA 81 daily, toprol 100, atorva 40   - TTE 9/2023 - LVEF 45-50%  #HTN - on losartan 100mg (recently increased outpatient from 50 to 100mg    Plan  Continues to improve. BP above target. Restart losartan at 50mg daily dose today  Discussed w pain management regarding pt's wish to decrease pain regimen.     DISPO: Acute rehab pending drain output, passing PT  Above d/w orthopedics  
64M w CAD s/p CABG, AFib on eliquis, HTN, prior spine surgeries w chronic pain, R foot drop, here for elective PSFT T9-Pelvis w Dr. Schwab 3/25, for HPT    #Post-op state - pain controlled. PPx: Eliquis 5 BID. On bowel regimen and incentive spirometer  #Lumbar stenosis   - Tylenol 1g q8, robaxin 500 q8, lyrica 50 q8h   - PCA: 0.2mg demand q6min lock out.   - PRN: Oxycodone 5/10 q4 for mod/severe pain     #Acute blood loss anemia - 7.6 from 7.9 from 9.9. Baseline 14.2. Asymptomatic  #Leukocytosis - Resolved.    #AFib s/p radioablation and WILLIAMS clipping - Toprol 100mg, eliquis 5mg BID  #CAD s/p CABG - on ASA 81 daily, toprol 100, atorva 40   - TTE 9/2023 - LVEF 45-50%  #HTN - on losartan 100mg - currently held    Plan  Agree w 1u RBC transfusion for symptomatic anemia - etiology possibly post-op as well as from drain output.  TOV  Continue PT - mobilize pt OOB    DISPO: HPT pending drain output, stable hgb, passing PT  Above d/w orthopedics  
64M w CAD s/p CABG, AFib on eliquis, HTN, prior spine surgeries w chronic pain, R foot drop, here for elective PSFT T9-Pelvis w Dr. Schwab 3/25, for acute rehab, s/p 1u RBC 3/27    #Post-op state - pain controlled. PPx: Eliquis 5 BID. On bowel regimen and incentive spirometer  #Lumbar stenosis   - Tylenol 1g q8, robaxin 750q8   - PRN: Oxycodone 5/10 q4 for mod/severe pain     #Acute blood loss anemia - 8.5 s/p 1u 3/27. Baseline 14.2. Asymptomatic  #Leukocytosis - Resolved.    #AFib s/p radioablation and WILLIAMS clipping - Toprol 100mg, eliquis 5mg BID  #CAD s/p CABG - on ASA 81 daily, toprol 100, atorva 40   - TTE 9/2023 - LVEF 45-50%  #HTN - on losartan 100mg - currently held    Plan  Clinically improved after 1u RBC 3/27. Continue PT and mobilize OOB    DISPO: Acute rehab pending drain output, passing PT  Above d/w orthopedics  
4 days post operative dry incision in place along the midline of the spine. one drain to be removed based on output by ortho.   no signs of infection at this time.   pt to follow up in our office 2 weeks post d/c 
pt is one day post operative back closure by plastics.   Pt doing well, no complaints at this time regarding incision.   upon, exam incision is dry, in place, with prineo intact. one drain visible that will be removed base don output.   no redness noted. Appropriate swelling noted to site.     pt to follow up in our office 2 weeks post discharge.

## 2024-03-29 NOTE — PROGRESS NOTE ADULT - REASON FOR ADMISSION
Back pain

## 2024-04-03 NOTE — PRE-ANESTHESIA EVALUATION ADULT - NSANTHPEFT_GEN_ALL_CORE
Detail Level: Detailed Add 70866 Cpt? (Important Note: In 2017 The Use Of 85574 Is Being Tracked By Cms To Determine Future Global Period Reimbursement For Global Periods): yes stable

## 2024-04-12 DIAGNOSIS — Z95.1 PRESENCE OF AORTOCORONARY BYPASS GRAFT: ICD-10-CM

## 2024-04-12 DIAGNOSIS — I25.10 ATHEROSCLEROTIC HEART DISEASE OF NATIVE CORONARY ARTERY WITHOUT ANGINA PECTORIS: ICD-10-CM

## 2024-04-12 DIAGNOSIS — M54.16 RADICULOPATHY, LUMBAR REGION: ICD-10-CM

## 2024-04-12 DIAGNOSIS — I10 ESSENTIAL (PRIMARY) HYPERTENSION: ICD-10-CM

## 2024-04-12 DIAGNOSIS — M40.35 FLATBACK SYNDROME, THORACOLUMBAR REGION: ICD-10-CM

## 2024-04-12 DIAGNOSIS — Z79.01 LONG TERM (CURRENT) USE OF ANTICOAGULANTS: ICD-10-CM

## 2024-04-12 DIAGNOSIS — M48.061 SPINAL STENOSIS, LUMBAR REGION WITHOUT NEUROGENIC CLAUDICATION: ICD-10-CM

## 2024-04-12 DIAGNOSIS — Z79.891 LONG TERM (CURRENT) USE OF OPIATE ANALGESIC: ICD-10-CM

## 2024-04-12 DIAGNOSIS — I48.91 UNSPECIFIED ATRIAL FIBRILLATION: ICD-10-CM

## 2024-04-12 DIAGNOSIS — M40.209 UNSPECIFIED KYPHOSIS, SITE UNSPECIFIED: ICD-10-CM

## 2024-04-12 DIAGNOSIS — D72.828 OTHER ELEVATED WHITE BLOOD CELL COUNT: ICD-10-CM

## 2024-04-12 DIAGNOSIS — M54.9 DORSALGIA, UNSPECIFIED: ICD-10-CM

## 2024-04-12 DIAGNOSIS — D62 ACUTE POSTHEMORRHAGIC ANEMIA: ICD-10-CM

## 2024-04-12 DIAGNOSIS — M21.371 FOOT DROP, RIGHT FOOT: ICD-10-CM

## 2024-04-12 DIAGNOSIS — Z79.82 LONG TERM (CURRENT) USE OF ASPIRIN: ICD-10-CM

## 2024-04-19 ENCOUNTER — NON-APPOINTMENT (OUTPATIENT)
Age: 64
End: 2024-04-19

## 2024-04-25 ENCOUNTER — APPOINTMENT (OUTPATIENT)
Dept: ORTHOPEDIC SURGERY | Facility: CLINIC | Age: 64
End: 2024-04-25
Payer: COMMERCIAL

## 2024-04-25 VITALS
OXYGEN SATURATION: 99 % | SYSTOLIC BLOOD PRESSURE: 151 MMHG | DIASTOLIC BLOOD PRESSURE: 94 MMHG | WEIGHT: 210 LBS | HEIGHT: 72 IN | HEART RATE: 85 BPM | BODY MASS INDEX: 28.44 KG/M2

## 2024-04-25 DIAGNOSIS — Z98.1 ARTHRODESIS STATUS: ICD-10-CM

## 2024-04-25 PROCEDURE — 99024 POSTOP FOLLOW-UP VISIT: CPT

## 2024-04-25 PROCEDURE — 72082 X-RAY EXAM ENTIRE SPI 2/3 VW: CPT

## 2024-04-26 PROBLEM — Z98.1 S/P SPINAL FUSION: Status: ACTIVE | Noted: 2024-04-22

## 2024-04-29 NOTE — DISCUSSION/SUMMARY
[de-identified] : A lengthy discussion was had with the patient regarding his anticipated recovery.   We have advised  to increase his ambulation tolerance and to remain active.  Furthermore, we have advised the patient to refrain from bending, lifting, or twisting.   The patient's questions were sought and answered satisfactorily.    F/u in 3 months with xray.  IOndina NP am acting as a scribe for Dr. Frank Schwab.

## 2024-04-29 NOTE — PHYSICAL EXAM
[de-identified] : NVI.  Incision well-healed.   [de-identified] : Scoliosis xray 4/25/2024:  Instrumentation intact, no halos about the screws.

## 2024-04-29 NOTE — HISTORY OF PRESENT ILLNESS
[de-identified] : Pt is approximately 1 months s/p revision thoracolumbar spinal fusion.  He was at [facility] x 2 weeks.  He is walking with the walker.  Has T/L junction pain with numbness.  Denies leg pain.   In PT and practicing ambulation.

## 2024-05-10 PROBLEM — M40.30: Chronic | Status: ACTIVE | Noted: 2024-03-22
